# Patient Record
Sex: FEMALE | Race: WHITE | HISPANIC OR LATINO
[De-identification: names, ages, dates, MRNs, and addresses within clinical notes are randomized per-mention and may not be internally consistent; named-entity substitution may affect disease eponyms.]

---

## 2017-09-18 ENCOUNTER — APPOINTMENT (OUTPATIENT)
Dept: MAMMOGRAPHY | Facility: CLINIC | Age: 67
End: 2017-09-18
Payer: COMMERCIAL

## 2017-09-18 ENCOUNTER — OUTPATIENT (OUTPATIENT)
Dept: OUTPATIENT SERVICES | Facility: HOSPITAL | Age: 67
LOS: 1 days | End: 2017-09-18
Payer: COMMERCIAL

## 2017-09-18 DIAGNOSIS — Z00.8 ENCOUNTER FOR OTHER GENERAL EXAMINATION: ICD-10-CM

## 2017-09-18 PROCEDURE — 77063 BREAST TOMOSYNTHESIS BI: CPT

## 2017-09-18 PROCEDURE — 77067 SCR MAMMO BI INCL CAD: CPT

## 2017-09-18 PROCEDURE — G0202: CPT | Mod: 26

## 2017-09-18 PROCEDURE — 77063 BREAST TOMOSYNTHESIS BI: CPT | Mod: 26

## 2017-09-26 ENCOUNTER — OUTPATIENT (OUTPATIENT)
Dept: OUTPATIENT SERVICES | Facility: HOSPITAL | Age: 67
LOS: 1 days | End: 2017-09-26
Payer: COMMERCIAL

## 2017-09-26 ENCOUNTER — APPOINTMENT (OUTPATIENT)
Dept: MAMMOGRAPHY | Facility: CLINIC | Age: 67
End: 2017-09-26
Payer: COMMERCIAL

## 2017-09-26 DIAGNOSIS — Z00.8 ENCOUNTER FOR OTHER GENERAL EXAMINATION: ICD-10-CM

## 2017-09-26 PROCEDURE — G0206: CPT | Mod: 26,LT

## 2017-09-26 PROCEDURE — 77065 DX MAMMO INCL CAD UNI: CPT

## 2017-09-26 PROCEDURE — G0279: CPT

## 2017-09-26 PROCEDURE — G0279: CPT | Mod: 26

## 2018-04-03 ENCOUNTER — APPOINTMENT (OUTPATIENT)
Dept: MAMMOGRAPHY | Facility: CLINIC | Age: 68
End: 2018-04-03
Payer: COMMERCIAL

## 2018-04-03 ENCOUNTER — OUTPATIENT (OUTPATIENT)
Dept: OUTPATIENT SERVICES | Facility: HOSPITAL | Age: 68
LOS: 1 days | End: 2018-04-03
Payer: COMMERCIAL

## 2018-04-03 DIAGNOSIS — Z00.8 ENCOUNTER FOR OTHER GENERAL EXAMINATION: ICD-10-CM

## 2018-04-03 PROCEDURE — 77065 DX MAMMO INCL CAD UNI: CPT | Mod: 26,LT

## 2018-04-03 PROCEDURE — 77065 DX MAMMO INCL CAD UNI: CPT

## 2018-11-30 ENCOUNTER — APPOINTMENT (OUTPATIENT)
Dept: MAMMOGRAPHY | Facility: CLINIC | Age: 68
End: 2018-11-30
Payer: COMMERCIAL

## 2018-11-30 ENCOUNTER — OUTPATIENT (OUTPATIENT)
Dept: OUTPATIENT SERVICES | Facility: HOSPITAL | Age: 68
LOS: 1 days | End: 2018-11-30
Payer: COMMERCIAL

## 2018-11-30 DIAGNOSIS — Z00.8 ENCOUNTER FOR OTHER GENERAL EXAMINATION: ICD-10-CM

## 2018-11-30 PROCEDURE — 77066 DX MAMMO INCL CAD BI: CPT

## 2018-11-30 PROCEDURE — G0279: CPT | Mod: 26

## 2018-11-30 PROCEDURE — 77066 DX MAMMO INCL CAD BI: CPT | Mod: 26

## 2018-11-30 PROCEDURE — G0279: CPT

## 2018-12-03 ENCOUNTER — APPOINTMENT (OUTPATIENT)
Dept: OTOLARYNGOLOGY | Facility: CLINIC | Age: 68
End: 2018-12-03

## 2019-07-01 ENCOUNTER — RESULT REVIEW (OUTPATIENT)
Age: 69
End: 2019-07-01

## 2019-07-08 ENCOUNTER — APPOINTMENT (OUTPATIENT)
Dept: CT IMAGING | Facility: CLINIC | Age: 69
End: 2019-07-08
Payer: COMMERCIAL

## 2019-07-08 ENCOUNTER — OUTPATIENT (OUTPATIENT)
Dept: OUTPATIENT SERVICES | Facility: HOSPITAL | Age: 69
LOS: 1 days | End: 2019-07-08
Payer: COMMERCIAL

## 2019-07-08 DIAGNOSIS — Z00.8 ENCOUNTER FOR OTHER GENERAL EXAMINATION: ICD-10-CM

## 2019-07-08 PROCEDURE — 82565 ASSAY OF CREATININE: CPT

## 2019-07-08 PROCEDURE — 74177 CT ABD & PELVIS W/CONTRAST: CPT | Mod: 26

## 2019-07-08 PROCEDURE — 71260 CT THORAX DX C+: CPT | Mod: 26

## 2019-07-08 PROCEDURE — 71260 CT THORAX DX C+: CPT

## 2019-07-08 PROCEDURE — 74177 CT ABD & PELVIS W/CONTRAST: CPT

## 2019-07-22 ENCOUNTER — OUTPATIENT (OUTPATIENT)
Dept: OUTPATIENT SERVICES | Facility: HOSPITAL | Age: 69
LOS: 1 days | End: 2019-07-22
Payer: SELF-PAY

## 2019-07-22 ENCOUNTER — APPOINTMENT (OUTPATIENT)
Dept: SURGERY | Facility: HOSPITAL | Age: 69
End: 2019-07-22
Payer: SELF-PAY

## 2019-07-22 VITALS
SYSTOLIC BLOOD PRESSURE: 169 MMHG | BODY MASS INDEX: 22.47 KG/M2 | HEART RATE: 68 BPM | WEIGHT: 119 LBS | HEIGHT: 61 IN | DIASTOLIC BLOOD PRESSURE: 78 MMHG

## 2019-07-22 DIAGNOSIS — L72.3 SEBACEOUS CYST: ICD-10-CM

## 2019-07-22 DIAGNOSIS — Z86.79 PERSONAL HISTORY OF OTHER DISEASES OF THE CIRCULATORY SYSTEM: ICD-10-CM

## 2019-07-22 DIAGNOSIS — Z86.39 PERSONAL HISTORY OF OTHER ENDOCRINE, NUTRITIONAL AND METABOLIC DISEASE: ICD-10-CM

## 2019-07-22 DIAGNOSIS — Z85.42 PERSONAL HISTORY OF MALIGNANT NEOPLASM OF OTHER PARTS OF UTERUS: ICD-10-CM

## 2019-07-22 DIAGNOSIS — Z85.89 PERSONAL HISTORY OF MALIGNANT NEOPLASM OF OTHER ORGANS AND SYSTEMS: ICD-10-CM

## 2019-07-22 PROCEDURE — 99243 OFF/OP CNSLTJ NEW/EST LOW 30: CPT

## 2019-07-22 PROCEDURE — G0463: CPT

## 2019-07-22 NOTE — HISTORY OF PRESENT ILLNESS
[FreeTextEntry1] : Pt. is a 70 yo F w/ PMHx of HTN, Uterine CA s/p BRIANNA in 2009, who presents w/ a ventral hernia x 5 years.\par The mass was originally non-painful, but has be come increasingly painful in the last 2 years and has grown in size. She reports that the pain is constant and intense (7/10) and worsened when the bowel protrudes. The hernia has been reducible, but still uncomfortable. She also reports increasing frequency of diarrhea, now at least 1x/wk, and frequent bloating. She denies vomiting, nausea, fever.\par \par She was referred to the surgery clinic by her PMD (Dr. Edgar) for surgical consultation regarding potential hernia repair. She is amenable to surgical management.

## 2019-07-22 NOTE — ASSESSMENT
[FreeTextEntry1] : 68 yo F w/ PMHx of HTN, Uterine CA s/p BRIANNA in 2009, who presents w/ a reducible ventral hernia x 5 years. The hernia appears to be periumbilical on the right and has been increasing in size and pain over the past 2 years. This hernia is associated with abdominal bloating and recurrent diarrhea x 1/week.\par \par -Pt. agrees for elective surgical repair of hernia\par -Pt. will need medical clearance\par -F.u with pt. to schedule surgical date once clearance has been obtained

## 2019-07-22 NOTE — ADDENDUM
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\par 12/31/2007 @ [facility] - laryngoscopy, bilateral tonsillectomy, random biopsies -> all benign\par 8/5/2008 @ [facility] - level 2/3 right neck dissection -> 5 benign lymph nodes with treatment effect (had undergone chemo and XRT for cervical lymph node metastasis of unknown primary)\par 7/1/2014 @ [place] - EGD / colonoscopy (both relatively unremarkable)\par \par 7/8/2019 - CT chest / abd/pelvis w/ contrast - relatively unremarkable\par \par laparotomy scar extending up to the umbilicus\par 1 cm reducible incisional hernia at the umbilicus\par \par The risks (bleeding, infection, mesh infection, bowel injury), benefits and alternatives of incisional hernia repair with mesh were discussed with the patient in Chadian. She desires surgery and will be booked electively after obtaining medical clearance from her PMD @ [facility] ([place] [phone]).\par \[facility] Will coordinate booking at either [facility] or [place] with her daughter Bertha ([phone]).\par

## 2019-07-22 NOTE — PHYSICAL EXAM
[Normal Rate and Rhythm] : normal rate and rhythm [Abdomen Tenderness] : ~T ~M Abdominal tenderness [Abdominal Masses] : Abdominal mass present [No Rash or Lesion] : No rash or lesion [Calm] : calm [JVD] : no jugular venous distention  [Tender] : was nontender [Enlarged] : not enlarged [de-identified] : Appears well, looks her stated age of 69 [de-identified] : R-sided healed surgical scar [de-identified] : Pt. has a RLQ bulging mass measuring about 3cm x 5cm during cough or valsalva. Defect, when bowel is reduced, is appx. 1cm x 1cm in the R periumbilical region. Mass is tender to palpation. The rest of the abdominal exam is benign.

## 2019-07-22 NOTE — HISTORY OF PRESENT ILLNESS
[FreeTextEntry1] : Pt. is a 68 yo F w/ PMHx of HTN, Uterine CA s/p BRIANNA in 2009, who presents w/ a ventral hernia x 5 years.\par The mass was originally non-painful, but has be come increasingly painful in the last 2 years and has grown in size. She reports that the pain is constant and intense (7/10) and worsened when the bowel protrudes. The hernia has been reducible, but still uncomfortable. She also reports increasing frequency of diarrhea, now at least 1x/wk, and frequent bloating. She denies vomiting, nausea, fever.\par \par She was referred to the surgery clinic by her PMD (Dr. Edgar) for surgical consultation regarding potential hernia repair. She is amenable to surgical management.

## 2019-07-22 NOTE — REVIEW OF SYSTEMS
[Abdominal Pain] : abdominal pain [Diarrhea] : diarrhea [Negative] : Respiratory [Vomiting] : no vomiting [Constipation] : no constipation

## 2019-07-22 NOTE — CONSULT LETTER
[Dear  ___] : Dear  [unfilled], [Consult Letter:] : I had the pleasure of evaluating your patient, [unfilled]. [Please see my note below.] : Please see my note below. [FreeTextEntry1] : \par \par As you know, she has a small reducible incisional hernia near her umbilicus. I offered her incisional hernia repair with mesh and she desires surgery.\par Could you please provide her with medical clearance? Feel free to email it to me at [email or web address] or fax it to attention of my assistant Lucila Anderson at [phone].\par \par  [Consult Closing:] : Thank you very much for allowing me to participate in the care of this patient.  If you have any questions, please do not hesitate to contact me. [FreeTextEntry3] : \par Maxime Nash MD, FACS\par Division of Acute Care Surgery\par Health system\par [phone] (phone)\par [phone] (fax)\par

## 2019-07-22 NOTE — ASSESSMENT
[FreeTextEntry1] : 70 yo F w/ PMHx of HTN, Uterine CA s/p BRIANNA in 2009, who presents w/ a reducible ventral hernia x 5 years. The hernia appears to be periumbilical on the right and has been increasing in size and pain over the past 2 years. This hernia is associated with abdominal bloating and recurrent diarrhea x 1/week.\par \par -Pt. agrees for elective surgical repair of hernia\par -Pt. will need medical clearance\par -F.u with pt. to schedule surgical date once clearance has been obtained

## 2019-07-22 NOTE — PHYSICAL EXAM
[Normal Rate and Rhythm] : normal rate and rhythm [Abdomen Tenderness] : ~T ~M Abdominal tenderness [Abdominal Masses] : Abdominal mass present [No Rash or Lesion] : No rash or lesion [Calm] : calm [JVD] : no jugular venous distention  [Tender] : was nontender [Enlarged] : not enlarged [de-identified] : Appears well, looks her stated age of 69 [de-identified] : R-sided healed surgical scar [de-identified] : Pt. has a RLQ bulging mass measuring about 3cm x 5cm during cough or valsalva. Defect, when bowel is reduced, is appx. 1cm x 1cm in the R periumbilical region. Mass is tender to palpation. The rest of the abdominal exam is benign.

## 2019-07-22 NOTE — ADDENDUM
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\par 12/31/2007 @ [facility] - laryngoscopy, bilateral tonsillectomy, random biopsies -> all benign\par 8/5/2008 @ [facility] - level 2/3 right neck dissection -> 5 benign lymph nodes with treatment effect (had undergone chemo and XRT for cervical lymph node metastasis of unknown primary)\par 7/1/2014 @ [place] - EGD / colonoscopy (both relatively unremarkable)\par \par 7/8/2019 - CT chest / abd/pelvis w/ contrast - relatively unremarkable\par \par laparotomy scar extending up to the umbilicus\par 1 cm reducible incisional hernia at the umbilicus\par \par The risks (bleeding, infection, mesh infection, bowel injury), benefits and alternatives of incisional hernia repair with mesh were discussed with the patient in Wallisian. She desires surgery and will be booked electively after obtaining medical clearance from her PMD @ [facility] ([place] [phone]).\par \[facility] Will coordinate booking at either [facility] or [place] with her daughter Bertha ([phone]).\par

## 2019-07-25 DIAGNOSIS — K43.9 VENTRAL HERNIA WITHOUT OBSTRUCTION OR GANGRENE: ICD-10-CM

## 2019-07-29 ENCOUNTER — APPOINTMENT (OUTPATIENT)
Dept: UROLOGY | Facility: HOSPITAL | Age: 69
End: 2019-07-29

## 2019-07-29 ENCOUNTER — OUTPATIENT (OUTPATIENT)
Dept: OUTPATIENT SERVICES | Facility: HOSPITAL | Age: 69
LOS: 1 days | End: 2019-07-29
Payer: SELF-PAY

## 2019-07-29 VITALS
DIASTOLIC BLOOD PRESSURE: 67 MMHG | BODY MASS INDEX: 22.84 KG/M2 | HEIGHT: 61 IN | RESPIRATION RATE: 14 BRPM | SYSTOLIC BLOOD PRESSURE: 126 MMHG | HEART RATE: 64 BPM | WEIGHT: 121 LBS

## 2019-07-29 DIAGNOSIS — R30.0 DYSURIA: ICD-10-CM

## 2019-07-29 LAB
APPEARANCE UR: CLEAR — SIGNIFICANT CHANGE UP
BACTERIA # UR AUTO: NEGATIVE — SIGNIFICANT CHANGE UP
BILIRUB UR-MCNC: NEGATIVE — SIGNIFICANT CHANGE UP
COLOR SPEC: SIGNIFICANT CHANGE UP
DIFF PNL FLD: NEGATIVE — SIGNIFICANT CHANGE UP
EPI CELLS # UR: 0 /HPF — SIGNIFICANT CHANGE UP (ref 0–5)
GLUCOSE UR QL: NEGATIVE — SIGNIFICANT CHANGE UP
HYALINE CASTS # UR AUTO: 0 /LPF — SIGNIFICANT CHANGE UP (ref 0–7)
KETONES UR-MCNC: NEGATIVE — SIGNIFICANT CHANGE UP
LEUKOCYTE ESTERASE UR-ACNC: ABNORMAL
NITRITE UR-MCNC: NEGATIVE — SIGNIFICANT CHANGE UP
PH UR: 6.5 — SIGNIFICANT CHANGE UP (ref 5–8)
PROT UR-MCNC: NEGATIVE — SIGNIFICANT CHANGE UP
RBC CASTS # UR COMP ASSIST: 1 /HPF — SIGNIFICANT CHANGE UP (ref 0–4)
SP GR SPEC: 1.01 — LOW (ref 1.01–1.02)
UROBILINOGEN FLD QL: SIGNIFICANT CHANGE UP
WBC UR QL: 90 /HPF — HIGH (ref 0–5)

## 2019-07-29 PROCEDURE — 87086 URINE CULTURE/COLONY COUNT: CPT

## 2019-07-29 PROCEDURE — G0463: CPT

## 2019-07-29 PROCEDURE — 87186 SC STD MICRODIL/AGAR DIL: CPT

## 2019-07-29 PROCEDURE — 81001 URINALYSIS AUTO W/SCOPE: CPT

## 2019-07-29 RX ORDER — ERGOCALCIFEROL 1.25 MG/1
1.25 MG CAPSULE, LIQUID FILLED ORAL
Refills: 0 | Status: ACTIVE | COMMUNITY
Start: 2019-07-22

## 2019-07-29 NOTE — ASSESSMENT
[FreeTextEntry1] : 69F with his of intrarenal stone and ? hx of microheamturia presents with cc of dysuria likely 2/2 vulvovagianl atrophy\par \par - Decrease fluid intake slightly\par - UA & Ucx\par - Pyridium x2 days\par - Estrase cream \par - No planned intervention regarding stone as pt is asymptomatic\par - CT images reviewed, no indication for cysto at this time

## 2019-07-29 NOTE — HISTORY OF PRESENT ILLNESS
[Urinary Frequency] : urinary frequency [Nocturia] : nocturia [Hematuria - Microscopic] : microscopic hematuria [Dysuria] : dysuria [Urinary Incontinence] : no urinary incontinence [FreeTextEntry1] : 69F with PMHx of HTN and HLD presenting with c/o dysuria and hx of microhematuria.  Pt s/p CT w/ IV contrast that shows a 8mm L intrarenal stone but otherwise unremarkable.  Pt has urine cytology that is negative.  Pt denies gross hematuria, straining to void or feelings of incompletely emptying.  States she void 7x/day and has nocturia x3 but this is chronic.  no caffeine use anymore.  No spicy food.  Drink ~ 4 large bottles of water per day.  Very mild stress incontinence hx.  \par \par No smoking hx or exposure hazards\par No family hx of  related malignancies \par No personal or family hx of breast cancer\par Personal hx of uterine and ?head/neck cancer [Urinary Retention] : no urinary retention [Urinary Urgency] : no urinary urgency [Straining] : no straining [Abdominal Pain] : no abdominal pain [Hematuria - Gross] : no gross hematuria [Fever] : no fever [Flank Pain] : no flank pain [Nausea] : no nausea

## 2019-07-29 NOTE — PHYSICAL EXAM
[General Appearance - Well Developed] : well developed [General Appearance - Well Nourished] : well nourished [Normal Appearance] : normal appearance [Well Groomed] : well groomed [Abdomen Soft] : soft [Abdomen Tenderness] : non-tender [Urethral Meatus] : normal urethra [Urinary Bladder Findings] : the bladder was normal on palpation [Vulvar Atrophy] : vulvar atrophy [Atrophy] : atrophy [No Bleeding] : no active bleeding [Normal Station and Gait] : the gait and station were normal for the patient's age [Skin Color & Pigmentation] : normal skin color and pigmentation [] : no rash [Oriented To Time, Place, And Person] : oriented to person, place, and time [Affect] : the affect was normal [Mood] : the mood was normal [FreeTextEntry1] : No stress incontinence ntoed

## 2019-07-30 DIAGNOSIS — I10 ESSENTIAL (PRIMARY) HYPERTENSION: ICD-10-CM

## 2019-09-09 ENCOUNTER — APPOINTMENT (OUTPATIENT)
Dept: UROLOGY | Facility: HOSPITAL | Age: 69
End: 2019-09-09

## 2019-09-09 ENCOUNTER — OUTPATIENT (OUTPATIENT)
Dept: OUTPATIENT SERVICES | Facility: HOSPITAL | Age: 69
LOS: 1 days | End: 2019-09-09
Payer: SELF-PAY

## 2019-09-09 VITALS
HEIGHT: 61 IN | SYSTOLIC BLOOD PRESSURE: 151 MMHG | WEIGHT: 121 LBS | BODY MASS INDEX: 22.84 KG/M2 | HEART RATE: 69 BPM | DIASTOLIC BLOOD PRESSURE: 69 MMHG | RESPIRATION RATE: 14 BRPM

## 2019-09-09 DIAGNOSIS — N20.0 CALCULUS OF KIDNEY: ICD-10-CM

## 2019-09-09 DIAGNOSIS — R30.0 DYSURIA: ICD-10-CM

## 2019-09-09 DIAGNOSIS — Z87.440 PERSONAL HISTORY OF URINARY (TRACT) INFECTIONS: ICD-10-CM

## 2019-09-09 PROCEDURE — G0463: CPT

## 2019-09-09 PROCEDURE — 87186 SC STD MICRODIL/AGAR DIL: CPT

## 2019-09-09 PROCEDURE — 81001 URINALYSIS AUTO W/SCOPE: CPT

## 2019-09-09 PROCEDURE — 87086 URINE CULTURE/COLONY COUNT: CPT

## 2019-09-09 RX ORDER — CIPROFLOXACIN HYDROCHLORIDE 500 MG/1
500 TABLET, FILM COATED ORAL
Qty: 6 | Refills: 0 | Status: DISCONTINUED | COMMUNITY
Start: 2019-08-05 | End: 2019-09-09

## 2019-09-09 RX ORDER — PHENAZOPYRIDINE 100 MG/1
100 TABLET, FILM COATED ORAL 3 TIMES DAILY
Qty: 6 | Refills: 0 | Status: DISCONTINUED | COMMUNITY
Start: 2019-07-29 | End: 2019-09-09

## 2019-09-10 LAB
APPEARANCE UR: CLEAR — SIGNIFICANT CHANGE UP
BACTERIA # UR AUTO: ABNORMAL
BILIRUB UR-MCNC: NEGATIVE — SIGNIFICANT CHANGE UP
COLOR SPEC: SIGNIFICANT CHANGE UP
DIFF PNL FLD: NEGATIVE — SIGNIFICANT CHANGE UP
EPI CELLS # UR: 0 /HPF — SIGNIFICANT CHANGE UP (ref 0–5)
GLUCOSE UR QL: NEGATIVE — SIGNIFICANT CHANGE UP
HYALINE CASTS # UR AUTO: 0 /LPF — SIGNIFICANT CHANGE UP (ref 0–7)
KETONES UR-MCNC: NEGATIVE — SIGNIFICANT CHANGE UP
LEUKOCYTE ESTERASE UR-ACNC: ABNORMAL
NITRITE UR-MCNC: POSITIVE
PH UR: 6.5 — SIGNIFICANT CHANGE UP (ref 5–8)
PROT UR-MCNC: NEGATIVE — SIGNIFICANT CHANGE UP
RBC CASTS # UR COMP ASSIST: 0 /HPF — SIGNIFICANT CHANGE UP (ref 0–4)
SP GR SPEC: 1.01 — LOW (ref 1.01–1.02)
UROBILINOGEN FLD QL: SIGNIFICANT CHANGE UP
WBC UR QL: 86 /HPF — HIGH (ref 0–5)

## 2019-09-30 ENCOUNTER — OUTPATIENT (OUTPATIENT)
Dept: OUTPATIENT SERVICES | Facility: HOSPITAL | Age: 69
LOS: 1 days | End: 2019-09-30
Payer: SELF-PAY

## 2019-09-30 ENCOUNTER — APPOINTMENT (OUTPATIENT)
Dept: UROLOGY | Facility: HOSPITAL | Age: 69
End: 2019-09-30

## 2019-09-30 VITALS
WEIGHT: 122 LBS | DIASTOLIC BLOOD PRESSURE: 72 MMHG | TEMPERATURE: 36.5 F | HEIGHT: 60 IN | HEART RATE: 63 BPM | BODY MASS INDEX: 23.95 KG/M2 | SYSTOLIC BLOOD PRESSURE: 154 MMHG | RESPIRATION RATE: 16 BRPM

## 2019-09-30 DIAGNOSIS — R30.0 DYSURIA: ICD-10-CM

## 2019-09-30 DIAGNOSIS — R82.71 BACTERIURIA: ICD-10-CM

## 2019-09-30 PROBLEM — N20.0 NEPHROLITHIASIS: Status: ACTIVE | Noted: 2019-09-09

## 2019-09-30 LAB
APPEARANCE UR: ABNORMAL
BACTERIA # UR AUTO: NEGATIVE — SIGNIFICANT CHANGE UP
BILIRUB UR-MCNC: NEGATIVE — SIGNIFICANT CHANGE UP
COLOR SPEC: SIGNIFICANT CHANGE UP
DIFF PNL FLD: NEGATIVE — SIGNIFICANT CHANGE UP
EPI CELLS # UR: 1 /HPF — SIGNIFICANT CHANGE UP (ref 0–5)
GLUCOSE UR QL: NEGATIVE — SIGNIFICANT CHANGE UP
HYALINE CASTS # UR AUTO: 1 /LPF — SIGNIFICANT CHANGE UP (ref 0–7)
KETONES UR-MCNC: NEGATIVE — SIGNIFICANT CHANGE UP
LEUKOCYTE ESTERASE UR-ACNC: NEGATIVE — SIGNIFICANT CHANGE UP
NITRITE UR-MCNC: NEGATIVE — SIGNIFICANT CHANGE UP
PH UR: 7.5 — SIGNIFICANT CHANGE UP (ref 5–8)
PROT UR-MCNC: NEGATIVE — SIGNIFICANT CHANGE UP
RBC CASTS # UR COMP ASSIST: 2 /HPF — SIGNIFICANT CHANGE UP (ref 0–4)
SP GR SPEC: 1.01 — SIGNIFICANT CHANGE UP (ref 1.01–1.02)
UROBILINOGEN FLD QL: SIGNIFICANT CHANGE UP
WBC UR QL: 0 /HPF — SIGNIFICANT CHANGE UP (ref 0–5)

## 2019-09-30 PROCEDURE — 87086 URINE CULTURE/COLONY COUNT: CPT

## 2019-09-30 PROCEDURE — G0463: CPT

## 2019-09-30 PROCEDURE — 81001 URINALYSIS AUTO W/SCOPE: CPT

## 2019-09-30 RX ORDER — ESTRADIOL 0.1 MG/G
0.1 CREAM VAGINAL
Qty: 10 | Refills: 0 | Status: DISCONTINUED | COMMUNITY
Start: 2019-07-29 | End: 2019-09-30

## 2019-09-30 RX ORDER — NITROFURANTOIN (MONOHYDRATE/MACROCRYSTALS) 25; 75 MG/1; MG/1
100 CAPSULE ORAL
Qty: 28 | Refills: 0 | Status: DISCONTINUED | COMMUNITY
Start: 2019-09-09 | End: 2019-09-30

## 2019-09-30 NOTE — PHYSICAL EXAM
[General Appearance - Well Developed] : well developed [General Appearance - Well Nourished] : well nourished [Bowel Sounds] : normal bowel sounds [Normal Appearance] : normal appearance [Abdomen Tenderness] : non-tender [Abdomen Soft] : soft [Costovertebral Angle Tenderness] : no ~M costovertebral angle tenderness [Skin Color & Pigmentation] : normal skin color and pigmentation [Heart Rate And Rhythm] : Heart rate and rhythm were normal [] : no respiratory distress [Respiration, Rhythm And Depth] : normal respiratory rhythm and effort [Exaggerated Use Of Accessory Muscles For Inspiration] : no accessory muscle use [Oriented To Time, Place, And Person] : oriented to person, place, and time [Affect] : the affect was normal [Mood] : the mood was normal [Normal Station and Gait] : the gait and station were normal for the patient's age [FreeTextEntry1] : patient deferred

## 2019-09-30 NOTE — ASSESSMENT
[Urinary Tract Infection (599.0\N39.0)] : qualitative ~C was positive [FreeTextEntry1] : 69 yr F, hx of microhematuria and kidney stones, presenting for unresolved UTI. Due patient's continued dysuria and cloudy urine, as well as patient side effect of tendon pain, will change to macrobid, which is susceptible based on last culture. Will place on extended course of 14 days to ensure adequate coverage. With regards to water consumption, advised patient to continue adequate hydration as the benefits of preventing further UTI outweigh her very mild stress incontinence symptoms. \par -macrobid 100 mg q12 for 14 days\par -continue hydration\par -U/A and UCx today, will call if new growth is found\par -RTC 3 weeks\par \par Nephrolithiasis continues to be asymptomatic, denied flank or inguinal pain, hematuria, states pain is only during urination. 8 mm L intrarenal stone seen on previous CT\par -No interventions required at this time, will monitor for now\par -Discussed with patient if symptomatic for sharp  colicky, flank pain, will RTC \par \par

## 2019-09-30 NOTE — PHYSICAL EXAM
[General Appearance - Well Developed] : well developed [General Appearance - Well Nourished] : well nourished [Normal Appearance] : normal appearance [Bowel Sounds] : normal bowel sounds [Abdomen Tenderness] : non-tender [Abdomen Soft] : soft [Costovertebral Angle Tenderness] : no ~M costovertebral angle tenderness [Heart Rate And Rhythm] : Heart rate and rhythm were normal [Skin Color & Pigmentation] : normal skin color and pigmentation [Exaggerated Use Of Accessory Muscles For Inspiration] : no accessory muscle use [] : no respiratory distress [Respiration, Rhythm And Depth] : normal respiratory rhythm and effort [Oriented To Time, Place, And Person] : oriented to person, place, and time [Affect] : the affect was normal [Mood] : the mood was normal [Normal Station and Gait] : the gait and station were normal for the patient's age [FreeTextEntry1] : patient deferred

## 2019-09-30 NOTE — HISTORY OF PRESENT ILLNESS
[FreeTextEntry1] : 69 yr F, hx of microhematuria and kidney stones, presenting for f/u of UTI. Patient culture 3 weeks ago positive for E. Coli, given cipro 3 days and pyridamine 2 days, felt urinary symptoms improved slightly when on medication, but now endorsing dysuria and cloudy urine. Patient also reported calf tenderness while taking cipro. Denies fevers, chills, hematuria, urinary frequency, urinary urgency. On last visit, patient had reported mild stress incontinence and was asked to drink slightly less water (prev at 4 large bottles of water per day), continues to endorse mild incontinence only when coughing severely.\par \par Patient has 8 mm L intrarenal stone seen on CT that continues to be asymptomatic, denies flank pain or gross hematuria. Negative cytology\par \par In regards to vaginal atrophy seen on last exam, patient was not able to obtain estrogen cream because it was too expensive. Does not have complaints at this time.\par \par Patient is Gabonese speaking with limited english, hx obtained from patient and daughter in room

## 2019-10-01 LAB
CULTURE RESULTS: SIGNIFICANT CHANGE UP
SPECIMEN SOURCE: SIGNIFICANT CHANGE UP

## 2019-10-04 ENCOUNTER — OUTPATIENT (OUTPATIENT)
Dept: OUTPATIENT SERVICES | Facility: HOSPITAL | Age: 69
LOS: 1 days | End: 2019-10-04
Payer: COMMERCIAL

## 2019-10-04 VITALS
HEART RATE: 69 BPM | HEIGHT: 60 IN | RESPIRATION RATE: 16 BRPM | TEMPERATURE: 98 F | DIASTOLIC BLOOD PRESSURE: 58 MMHG | OXYGEN SATURATION: 100 % | SYSTOLIC BLOOD PRESSURE: 142 MMHG | WEIGHT: 119.05 LBS

## 2019-10-04 DIAGNOSIS — Z98.890 OTHER SPECIFIED POSTPROCEDURAL STATES: Chronic | ICD-10-CM

## 2019-10-04 DIAGNOSIS — K43.9 VENTRAL HERNIA WITHOUT OBSTRUCTION OR GANGRENE: ICD-10-CM

## 2019-10-04 DIAGNOSIS — Z01.818 ENCOUNTER FOR OTHER PREPROCEDURAL EXAMINATION: ICD-10-CM

## 2019-10-04 LAB
ANION GAP SERPL CALC-SCNC: 5 MMOL/L — SIGNIFICANT CHANGE UP (ref 5–17)
APTT BLD: 33.1 SEC — SIGNIFICANT CHANGE UP (ref 27.5–36.3)
BASOPHILS # BLD AUTO: 0.06 K/UL — SIGNIFICANT CHANGE UP (ref 0–0.2)
BASOPHILS NFR BLD AUTO: 1 % — SIGNIFICANT CHANGE UP (ref 0–2)
BUN SERPL-MCNC: 19 MG/DL — SIGNIFICANT CHANGE UP (ref 7–23)
CALCIUM SERPL-MCNC: 9.6 MG/DL — SIGNIFICANT CHANGE UP (ref 8.5–10.1)
CHLORIDE SERPL-SCNC: 106 MMOL/L — SIGNIFICANT CHANGE UP (ref 96–108)
CO2 SERPL-SCNC: 27 MMOL/L — SIGNIFICANT CHANGE UP (ref 22–31)
CREAT SERPL-MCNC: 0.9 MG/DL — SIGNIFICANT CHANGE UP (ref 0.5–1.3)
EOSINOPHIL # BLD AUTO: 0.11 K/UL — SIGNIFICANT CHANGE UP (ref 0–0.5)
EOSINOPHIL NFR BLD AUTO: 1.9 % — SIGNIFICANT CHANGE UP (ref 0–6)
GLUCOSE SERPL-MCNC: 111 MG/DL — HIGH (ref 70–99)
HCT VFR BLD CALC: 36.7 % — SIGNIFICANT CHANGE UP (ref 34.5–45)
HGB BLD-MCNC: 11.5 G/DL — SIGNIFICANT CHANGE UP (ref 11.5–15.5)
IMM GRANULOCYTES NFR BLD AUTO: 0.2 % — SIGNIFICANT CHANGE UP (ref 0–1.5)
INR BLD: 1.06 RATIO — SIGNIFICANT CHANGE UP (ref 0.88–1.16)
LYMPHOCYTES # BLD AUTO: 1.83 K/UL — SIGNIFICANT CHANGE UP (ref 1–3.3)
LYMPHOCYTES # BLD AUTO: 32 % — SIGNIFICANT CHANGE UP (ref 13–44)
MCHC RBC-ENTMCNC: 26.4 PG — LOW (ref 27–34)
MCHC RBC-ENTMCNC: 31.3 GM/DL — LOW (ref 32–36)
MCV RBC AUTO: 84.2 FL — SIGNIFICANT CHANGE UP (ref 80–100)
MONOCYTES # BLD AUTO: 0.51 K/UL — SIGNIFICANT CHANGE UP (ref 0–0.9)
MONOCYTES NFR BLD AUTO: 8.9 % — SIGNIFICANT CHANGE UP (ref 2–14)
NEUTROPHILS # BLD AUTO: 3.2 K/UL — SIGNIFICANT CHANGE UP (ref 1.8–7.4)
NEUTROPHILS NFR BLD AUTO: 56 % — SIGNIFICANT CHANGE UP (ref 43–77)
PLATELET # BLD AUTO: 246 K/UL — SIGNIFICANT CHANGE UP (ref 150–400)
POTASSIUM SERPL-MCNC: 3.8 MMOL/L — SIGNIFICANT CHANGE UP (ref 3.5–5.3)
POTASSIUM SERPL-SCNC: 3.8 MMOL/L — SIGNIFICANT CHANGE UP (ref 3.5–5.3)
PROTHROM AB SERPL-ACNC: 11.8 SEC — SIGNIFICANT CHANGE UP (ref 10–12.9)
RBC # BLD: 4.36 M/UL — SIGNIFICANT CHANGE UP (ref 3.8–5.2)
RBC # FLD: 15.7 % — HIGH (ref 10.3–14.5)
SODIUM SERPL-SCNC: 138 MMOL/L — SIGNIFICANT CHANGE UP (ref 135–145)
WBC # BLD: 5.72 K/UL — SIGNIFICANT CHANGE UP (ref 3.8–10.5)
WBC # FLD AUTO: 5.72 K/UL — SIGNIFICANT CHANGE UP (ref 3.8–10.5)

## 2019-10-04 PROCEDURE — 93005 ELECTROCARDIOGRAM TRACING: CPT

## 2019-10-04 PROCEDURE — 87640 STAPH A DNA AMP PROBE: CPT

## 2019-10-04 PROCEDURE — 85025 COMPLETE CBC W/AUTO DIFF WBC: CPT

## 2019-10-04 PROCEDURE — 86901 BLOOD TYPING SEROLOGIC RH(D): CPT

## 2019-10-04 PROCEDURE — G0463: CPT | Mod: 25

## 2019-10-04 PROCEDURE — 71046 X-RAY EXAM CHEST 2 VIEWS: CPT | Mod: 26

## 2019-10-04 PROCEDURE — 86900 BLOOD TYPING SEROLOGIC ABO: CPT

## 2019-10-04 PROCEDURE — 85610 PROTHROMBIN TIME: CPT

## 2019-10-04 PROCEDURE — 87641 MR-STAPH DNA AMP PROBE: CPT

## 2019-10-04 PROCEDURE — 80048 BASIC METABOLIC PNL TOTAL CA: CPT

## 2019-10-04 PROCEDURE — 86850 RBC ANTIBODY SCREEN: CPT

## 2019-10-04 PROCEDURE — 71046 X-RAY EXAM CHEST 2 VIEWS: CPT

## 2019-10-04 PROCEDURE — 93010 ELECTROCARDIOGRAM REPORT: CPT

## 2019-10-04 PROCEDURE — 85730 THROMBOPLASTIN TIME PARTIAL: CPT

## 2019-10-04 PROCEDURE — 36415 COLL VENOUS BLD VENIPUNCTURE: CPT

## 2019-10-04 NOTE — H&P PST ADULT - HISTORY OF PRESENT ILLNESS
69 year old female with incisional hernia she presents to PST for planned incisional hernia repair 69 year old female with incisional hernia c/o abdominal pain especially when walking as well as tenderness and bulging hernia;   she presents to PST for planned incisional hernia repair 69 year old female with incisional hernia c/o abdominal pain especially when walking as well as tenderness and bulging hernia;   she presents to PST for planned incisional hernia repair   Pt had radiation of neck due to SCC with lymph node removal ; Pt said she can eat and swallow pills without difficulty however she has difficulty eating dry foods like cheese crackers ;

## 2019-10-04 NOTE — H&P PST ADULT - NSICDXPASTMEDICALHX_GEN_ALL_CORE_FT
PAST MEDICAL HISTORY:  CAD (coronary artery disease)     Chest Pain (ICD9 786.50)     Chronic Gastritis (ICD9 535.10)     Dyslipidemia (ICD9 272.4)     H/O: Osteoarthritis (ICD9 V13.4)     History of PSVT (paroxysmal supraventricular tachycardia)     HTN - Hypertension     SCC (Squamous Cell Carcinoma) (ICD9 173.9) Head and neck 2009 treated with Chem/Radiation/ LN removal    Uterine Cancer (ICD9 179) PAST MEDICAL HISTORY:  CAD (coronary artery disease)     Chest Pain (ICD9 786.50)     Chronic Gastritis (ICD9 535.10)     Dyslipidemia (ICD9 272.4)     H/O: Osteoarthritis (ICD9 V13.4)     History of PSVT (paroxysmal supraventricular tachycardia)     HTN - Hypertension     Incisional hernia     Kidney stone     SCC (Squamous Cell Carcinoma) (ICD9 173.9) Head and neck 2009 treated with Chem/Radiation/ LN removal    Uterine Cancer (ICD9 179) no chemo no radiation

## 2019-10-04 NOTE — H&P PST ADULT - NSICDXPASTSURGICALHX_GEN_ALL_CORE_FT
PAST SURGICAL HISTORY:  H/O: Hysterectomy (ICD9 V88.01)     History of Cholecystectomy (ICD9 V45.79)     History of Tonsillectomy (ICD9 V45.89)     Lymph Node Cancer (ICD9 196.9) removal of cervical LN due to head & neck squamous cell carcinoma PAST SURGICAL HISTORY:  H/O: Hysterectomy (ICD9 V88.01) at age 32    History of Cholecystectomy (ICD9 V45.79) 2007    History of Tonsillectomy (ICD9 V45.89)     Lymph Node Cancer (ICD9 196.9) removal of cervical LN due to head & neck squamous cell carcinoma ; 2009    S/P cardiac cath 2009

## 2019-10-04 NOTE — H&P PST ADULT - ATTENDING COMMENTS
Cleared by her PMD at [facility] last week.  She describes tongue swelling and syncope after PCN in the past.  reducible incisional hernia palpated just to the right inferior aspect of her umbilicus.    The risks (bleeding, infection, bowel injury, mesh infection requiring surgical removal), benefits and alternatives of incisional hernia repair with mesh were discussed with the patient in Bulgarian. Written informed consent was obtained for surgery.

## 2019-10-04 NOTE — H&P PST ADULT - ASSESSMENT
69 year old female  presents to PST for planned incisional hernia repair     Plan:  1. PST instructions given ; NPO post midnight   2. Pt instructed to take following meds with sip of water : metoprolol lisinopril amlodipine   3. EZ wash instructions given & mupirocin instructions given  4. Medical Optimization  with Dr David Edgar   5. Stop NSAIDS ( Aspirin Alev Motrin Mobic Diclofenac), herbal supplements , MVI , Vitamin fish oil 7 days prior to surgery  unless directed by surgeon or cardiologist;   6. Labs EKG CXR as per surgeon request 69 year old female  presents to PST for planned incisional hernia repair     Plan:  1. PST instructions given ; NPO post midnight   2. Pt instructed to take following meds with sip of water : metoprolol lisinopril amlodipine   3. EZ wash instructions given & mupirocin instructions given  4. Medical Optimization  with Dr David Edgar   5. Stop NSAIDS ( Aspirin Alev Motrin Mobic Diclofenac), herbal supplements , MVI , Vitamin fish oil 7 days prior to surgery  unless directed by surgeon or cardiologist;   6. Labs EKG CXR as per surgeon request   7. Patient with history of radiation of throat left message anesthesia office 69 year old female  presents to PST for planned incisional hernia repair     Plan:  1. PST instructions given ; NPO post midnight   2. Pt instructed to take following meds with sip of water : metoprolol lisinopril amlodipine   3. EZ wash instructions given & mupirocin instructions given  4. Medical Optimization  with Dr David Edgar   5. Stop NSAIDS ( Aspirin Alev Motrin Mobic Diclofenac), herbal supplements , MVI , Vitamin fish oil 7 days prior to surgery  unless directed by surgeon or cardiologist;   6. Labs EKG CXR as per surgeon request   7. Patient with history of radiation of throat left message anesthesia office ; 69 year old female  presents to PST for planned incisional hernia repair     Plan:  1. PST instructions given ; NPO post midnight   2. Pt instructed to take following meds with sip of water : metoprolol lisinopril amlodipine   3. EZ wash instructions given & mupirocin instructions given  4. Medical Optimization  with Dr David Edgar   5. Stop NSAIDS ( Aspirin Alev Motrin Mobic Diclofenac), herbal supplements , MVI , Vitamin fish oil 7 days prior to surgery  unless directed by surgeon or cardiologist;   6. Labs EKG CXR as per surgeon request   7. Patient with history of radiation of throat ; discussed with Dr Garcia pt throat not severely deformed pt able to breath without difficulty

## 2019-10-05 DIAGNOSIS — Z01.818 ENCOUNTER FOR OTHER PREPROCEDURAL EXAMINATION: ICD-10-CM

## 2019-10-05 DIAGNOSIS — K43.9 VENTRAL HERNIA WITHOUT OBSTRUCTION OR GANGRENE: ICD-10-CM

## 2019-10-05 LAB
MRSA PCR RESULT.: SIGNIFICANT CHANGE UP
S AUREUS DNA NOSE QL NAA+PROBE: SIGNIFICANT CHANGE UP

## 2019-10-10 ENCOUNTER — OUTPATIENT (OUTPATIENT)
Dept: INPATIENT UNIT | Facility: HOSPITAL | Age: 69
LOS: 1 days | Discharge: HOME | End: 2019-10-10
Payer: COMMERCIAL

## 2019-10-10 ENCOUNTER — APPOINTMENT (OUTPATIENT)
Age: 69
End: 2019-10-10
Payer: SUBSIDIZED

## 2019-10-10 VITALS
OXYGEN SATURATION: 99 % | DIASTOLIC BLOOD PRESSURE: 50 MMHG | TEMPERATURE: 98 F | SYSTOLIC BLOOD PRESSURE: 135 MMHG | RESPIRATION RATE: 14 BRPM | HEART RATE: 67 BPM

## 2019-10-10 VITALS
HEIGHT: 60 IN | TEMPERATURE: 98 F | DIASTOLIC BLOOD PRESSURE: 79 MMHG | OXYGEN SATURATION: 100 % | WEIGHT: 119.05 LBS | SYSTOLIC BLOOD PRESSURE: 154 MMHG | RESPIRATION RATE: 16 BRPM | HEART RATE: 79 BPM

## 2019-10-10 DIAGNOSIS — K43.9 VENTRAL HERNIA WITHOUT OBSTRUCTION OR GANGRENE: ICD-10-CM

## 2019-10-10 DIAGNOSIS — Z98.890 OTHER SPECIFIED POSTPROCEDURAL STATES: Chronic | ICD-10-CM

## 2019-10-10 PROCEDURE — 49561: CPT | Mod: GC

## 2019-10-10 PROCEDURE — 49568: CPT | Mod: GC

## 2019-10-10 PROCEDURE — C1781: CPT

## 2019-10-10 RX ORDER — OXYCODONE HYDROCHLORIDE 5 MG/1
1 TABLET ORAL
Qty: 0 | Refills: 0 | DISCHARGE
Start: 2019-10-10

## 2019-10-10 RX ORDER — MORPHINE SULFATE 50 MG/1
2 CAPSULE, EXTENDED RELEASE ORAL EVERY 6 HOURS
Refills: 0 | Status: DISCONTINUED | OUTPATIENT
Start: 2019-10-10 | End: 2019-10-10

## 2019-10-10 RX ORDER — OXYCODONE HYDROCHLORIDE 5 MG/1
5 TABLET ORAL ONCE
Refills: 0 | Status: DISCONTINUED | OUTPATIENT
Start: 2019-10-10 | End: 2019-10-10

## 2019-10-10 RX ORDER — SODIUM CHLORIDE 9 MG/ML
1000 INJECTION, SOLUTION INTRAVENOUS
Refills: 0 | Status: DISCONTINUED | OUTPATIENT
Start: 2019-10-10 | End: 2019-10-10

## 2019-10-10 RX ORDER — ONDANSETRON 8 MG/1
4 TABLET, FILM COATED ORAL ONCE
Refills: 0 | Status: COMPLETED | OUTPATIENT
Start: 2019-10-10 | End: 2019-10-10

## 2019-10-10 RX ORDER — OXYCODONE HYDROCHLORIDE 5 MG/1
10 TABLET ORAL ONCE
Refills: 0 | Status: DISCONTINUED | OUTPATIENT
Start: 2019-10-10 | End: 2019-10-10

## 2019-10-10 RX ORDER — IBUPROFEN 200 MG
600 TABLET ORAL EVERY 4 HOURS
Refills: 0 | Status: DISCONTINUED | OUTPATIENT
Start: 2019-10-10 | End: 2019-10-10

## 2019-10-10 RX ORDER — FENTANYL CITRATE 50 UG/ML
50 INJECTION INTRAVENOUS
Refills: 0 | Status: DISCONTINUED | OUTPATIENT
Start: 2019-10-10 | End: 2019-10-10

## 2019-10-10 RX ADMIN — FENTANYL CITRATE 50 MICROGRAM(S): 50 INJECTION INTRAVENOUS at 18:00

## 2019-10-10 RX ADMIN — ONDANSETRON 4 MILLIGRAM(S): 8 TABLET, FILM COATED ORAL at 19:46

## 2019-10-10 RX ADMIN — FENTANYL CITRATE 50 MICROGRAM(S): 50 INJECTION INTRAVENOUS at 17:46

## 2019-10-10 RX ADMIN — FENTANYL CITRATE 50 MICROGRAM(S): 50 INJECTION INTRAVENOUS at 18:35

## 2019-10-10 RX ADMIN — OXYCODONE HYDROCHLORIDE 10 MILLIGRAM(S): 5 TABLET ORAL at 17:46

## 2019-10-10 RX ADMIN — OXYCODONE HYDROCHLORIDE 10 MILLIGRAM(S): 5 TABLET ORAL at 18:25

## 2019-10-10 NOTE — BRIEF OPERATIVE NOTE - NSICDXBRIEFPROCEDURE_GEN_ALL_CORE_FT
PROCEDURES:  Open repair of incisional hernia using synthetic mesh 10-Oct-2019 17:39:17  Calixto Romo

## 2019-10-10 NOTE — BRIEF OPERATIVE NOTE - OPERATION/FINDINGS
4cm fascial defect encountered  Fat containing incisional hernia reduced  8cm ventralex mesh used for repair

## 2019-10-10 NOTE — ASU DISCHARGE PLAN (ADULT/PEDIATRIC) - CARE PROVIDER_API CALL
Maxime Nash (MD)  Surgery; Surgical Critical Care  [address], [address], [place] [id number]  Phone: [phone]  Fax: [phone]  Follow Up Time:

## 2019-10-10 NOTE — ASU PATIENT PROFILE, ADULT - PSH
H/O: Hysterectomy (ICD9 V88.01)  at age 32  History of Cholecystectomy (ICD9 V45.79)  2007  History of Tonsillectomy (ICD9 V45.89)    Lymph Node Cancer (ICD9 196.9)  removal of cervical LN due to head & neck squamous cell carcinoma ; 2009  S/P cardiac cath  2009

## 2019-10-11 RX ORDER — OXYCODONE HYDROCHLORIDE 5 MG/1
1 TABLET ORAL
Qty: 20 | Refills: 0
Start: 2019-10-11 | End: 2019-10-17

## 2019-10-14 NOTE — PHYSICAL EXAM
[General Appearance - Well Nourished] : well nourished [General Appearance - Well Developed] : well developed [Normal Appearance] : normal appearance [Well Groomed] : well groomed [General Appearance - In No Acute Distress] : no acute distress [Abdomen Tenderness] : non-tender [Abdomen Soft] : soft [Abdomen Mass (___ Cm)] : no abdominal mass palpated [Skin Color & Pigmentation] : normal skin color and pigmentation [] : no respiratory distress [Oriented To Time, Place, And Person] : oriented to person, place, and time [Not Anxious] : not anxious [Affect] : the affect was normal [Mood] : the mood was normal [Normal Station and Gait] : the gait and station were normal for the patient's age [No Focal Deficits] : no focal deficits [FreeTextEntry1] : No stress incontinence when asked ot void, +cystocele and + anterior movement of urethra upon cough

## 2019-10-14 NOTE — REVIEW OF SYSTEMS
[Dysuria] : dysuria [Negative] : Psychiatric [Fever] : no fever [Chills] : no chills [Shortness Of Breath] : no shortness of breath [Vomiting] : no vomiting [Incontinence] : no incontinence [Abdominal Pain] : no abdominal pain

## 2019-10-14 NOTE — HISTORY OF PRESENT ILLNESS
[Urinary Urgency] : urinary urgency [Urinary Frequency] : urinary frequency [Dysuria] : dysuria [FreeTextEntry1] : ( [id number]) This is a 69 year old female hx of microhematuria, kidney stones here for follow up for recurrent UTIs.  She was switched from cipro to macrobid for 14 days due to continued symptoms and left calf pain.  Most recent urine culture 7/3/19 shows >100k ecoli.  Since she was switched over to macrobid, she has completed her course and states that her dysuria improved but are not completely gone.  Also endorses suprapubic pain that occurs 2x a week.  Denies fevers, chills, N/V, flank/back pain, hematuria. [Urinary Incontinence] : no urinary incontinence [Straining] : no straining [Nocturia] : no nocturia [Urinary Retention] : no urinary retention [Weak Stream] : no weak stream [Hematuria - Gross] : no gross hematuria [Abdominal Pain] : no abdominal pain [Flank Pain] : no flank pain

## 2019-10-14 NOTE — ASSESSMENT
[FreeTextEntry1] : 69 year old female with recurrent UTIs, nephrolithiasis\par \par -no intervention needed currently for the kidney stones, given patient is asymptomatic\par -obtain repeat urine culture and urinalysis to assess resolution of UTI\par -antibiotics not prescribed on this visit\par -will follow results

## 2019-10-16 DIAGNOSIS — I25.10 ATHEROSCLEROTIC HEART DISEASE OF NATIVE CORONARY ARTERY WITHOUT ANGINA PECTORIS: ICD-10-CM

## 2019-10-16 DIAGNOSIS — K21.9 GASTRO-ESOPHAGEAL REFLUX DISEASE WITHOUT ESOPHAGITIS: ICD-10-CM

## 2019-10-16 DIAGNOSIS — K43.0 INCISIONAL HERNIA WITH OBSTRUCTION, WITHOUT GANGRENE: ICD-10-CM

## 2019-10-16 DIAGNOSIS — Z79.82 LONG TERM (CURRENT) USE OF ASPIRIN: ICD-10-CM

## 2019-10-16 DIAGNOSIS — Z85.828 PERSONAL HISTORY OF OTHER MALIGNANT NEOPLASM OF SKIN: ICD-10-CM

## 2019-10-16 DIAGNOSIS — Z85.858 PERSONAL HISTORY OF MALIGNANT NEOPLASM OF OTHER ENDOCRINE GLANDS: ICD-10-CM

## 2019-10-16 DIAGNOSIS — E78.5 HYPERLIPIDEMIA, UNSPECIFIED: ICD-10-CM

## 2019-10-16 DIAGNOSIS — Z90.710 ACQUIRED ABSENCE OF BOTH CERVIX AND UTERUS: ICD-10-CM

## 2019-10-16 DIAGNOSIS — Z85.42 PERSONAL HISTORY OF MALIGNANT NEOPLASM OF OTHER PARTS OF UTERUS: ICD-10-CM

## 2019-10-16 DIAGNOSIS — M19.90 UNSPECIFIED OSTEOARTHRITIS, UNSPECIFIED SITE: ICD-10-CM

## 2019-10-16 DIAGNOSIS — Z90.49 ACQUIRED ABSENCE OF OTHER SPECIFIED PARTS OF DIGESTIVE TRACT: ICD-10-CM

## 2019-10-16 DIAGNOSIS — Z88.8 ALLERGY STATUS TO OTHER DRUGS, MEDICAMENTS AND BIOLOGICAL SUBSTANCES STATUS: ICD-10-CM

## 2019-10-16 DIAGNOSIS — I10 ESSENTIAL (PRIMARY) HYPERTENSION: ICD-10-CM

## 2019-10-16 DIAGNOSIS — I47.1 SUPRAVENTRICULAR TACHYCARDIA: ICD-10-CM

## 2019-10-16 DIAGNOSIS — Z88.0 ALLERGY STATUS TO PENICILLIN: ICD-10-CM

## 2019-10-21 PROBLEM — N20.0 CALCULUS OF KIDNEY: Chronic | Status: ACTIVE | Noted: 2019-10-04

## 2019-10-21 PROBLEM — Z86.79 PERSONAL HISTORY OF OTHER DISEASES OF THE CIRCULATORY SYSTEM: Chronic | Status: ACTIVE | Noted: 2019-10-04

## 2019-10-21 PROBLEM — K43.2 INCISIONAL HERNIA WITHOUT OBSTRUCTION OR GANGRENE: Chronic | Status: ACTIVE | Noted: 2019-10-04

## 2019-10-21 PROBLEM — I25.10 ATHEROSCLEROTIC HEART DISEASE OF NATIVE CORONARY ARTERY WITHOUT ANGINA PECTORIS: Chronic | Status: ACTIVE | Noted: 2019-10-04

## 2019-10-23 ENCOUNTER — APPOINTMENT (OUTPATIENT)
Dept: SURGERY | Facility: CLINIC | Age: 69
End: 2019-10-23
Payer: SELF-PAY

## 2019-10-23 VITALS
DIASTOLIC BLOOD PRESSURE: 69 MMHG | HEART RATE: 71 BPM | WEIGHT: 122 LBS | HEIGHT: 60 IN | OXYGEN SATURATION: 97 % | BODY MASS INDEX: 23.95 KG/M2 | SYSTOLIC BLOOD PRESSURE: 131 MMHG

## 2019-10-23 DIAGNOSIS — K43.9 VENTRAL HERNIA W/OUT OBSTRUCTION OR GANGRENE: ICD-10-CM

## 2019-10-23 PROCEDURE — 99024 POSTOP FOLLOW-UP VISIT: CPT

## 2019-10-23 NOTE — HISTORY OF PRESENT ILLNESS
[de-identified] : no pathology [de-identified] : tolerating regular diet without nausea, vomiting or abdominal pain. initially constipated after surgery, but now having normal BM.\par no fevers or chills.\par c/o pain several centimeters to the right of her incision since the day of surgery. pain is worse with movement and laying down at night.

## 2019-10-23 NOTE — PHYSICAL EXAM
[de-identified] : appears well [de-identified] : soft / NT / ND [de-identified] : midline periumbilical incision healed without infection.

## 2019-10-23 NOTE — PLAN
[FreeTextEntry1] : I removed the staples.\par I explained that the pain is likely some pulling on her right external oblique muscles after surgery. She should return to the office in 2 weeks if the pain is not improving.

## 2019-10-28 ENCOUNTER — APPOINTMENT (OUTPATIENT)
Dept: UROLOGY | Facility: HOSPITAL | Age: 69
End: 2019-10-28

## 2020-01-12 ENCOUNTER — EMERGENCY (EMERGENCY)
Facility: HOSPITAL | Age: 70
LOS: 0 days | Discharge: HOME | End: 2020-01-12
Attending: STUDENT IN AN ORGANIZED HEALTH CARE EDUCATION/TRAINING PROGRAM
Payer: SELF-PAY

## 2020-01-12 VITALS
HEIGHT: 60 IN | TEMPERATURE: 98 F | WEIGHT: 104.94 LBS | SYSTOLIC BLOOD PRESSURE: 143 MMHG | DIASTOLIC BLOOD PRESSURE: 56 MMHG | OXYGEN SATURATION: 98 % | HEART RATE: 72 BPM | RESPIRATION RATE: 16 BRPM

## 2020-01-12 VITALS
DIASTOLIC BLOOD PRESSURE: 54 MMHG | SYSTOLIC BLOOD PRESSURE: 143 MMHG | RESPIRATION RATE: 18 BRPM | TEMPERATURE: 97 F | OXYGEN SATURATION: 98 % | HEART RATE: 79 BPM

## 2020-01-12 DIAGNOSIS — Z79.82 LONG TERM (CURRENT) USE OF ASPIRIN: ICD-10-CM

## 2020-01-12 DIAGNOSIS — I10 ESSENTIAL (PRIMARY) HYPERTENSION: ICD-10-CM

## 2020-01-12 DIAGNOSIS — Z98.890 OTHER SPECIFIED POSTPROCEDURAL STATES: Chronic | ICD-10-CM

## 2020-01-12 DIAGNOSIS — R42 DIZZINESS AND GIDDINESS: ICD-10-CM

## 2020-01-12 DIAGNOSIS — Z85.42 PERSONAL HISTORY OF MALIGNANT NEOPLASM OF OTHER PARTS OF UTERUS: ICD-10-CM

## 2020-01-12 DIAGNOSIS — I25.10 ATHEROSCLEROTIC HEART DISEASE OF NATIVE CORONARY ARTERY WITHOUT ANGINA PECTORIS: ICD-10-CM

## 2020-01-12 DIAGNOSIS — Z88.0 ALLERGY STATUS TO PENICILLIN: ICD-10-CM

## 2020-01-12 DIAGNOSIS — Z87.442 PERSONAL HISTORY OF URINARY CALCULI: ICD-10-CM

## 2020-01-12 DIAGNOSIS — Z85.828 PERSONAL HISTORY OF OTHER MALIGNANT NEOPLASM OF SKIN: ICD-10-CM

## 2020-01-12 DIAGNOSIS — R55 SYNCOPE AND COLLAPSE: ICD-10-CM

## 2020-01-12 LAB
ALBUMIN SERPL ELPH-MCNC: 4.1 G/DL — SIGNIFICANT CHANGE UP (ref 3.3–5)
ALP SERPL-CCNC: 116 U/L — SIGNIFICANT CHANGE UP (ref 40–120)
ALT FLD-CCNC: 19 U/L — SIGNIFICANT CHANGE UP (ref 12–78)
ANION GAP SERPL CALC-SCNC: 5 MMOL/L — SIGNIFICANT CHANGE UP (ref 5–17)
APTT BLD: 31 SEC — SIGNIFICANT CHANGE UP (ref 27.5–36.3)
AST SERPL-CCNC: 19 U/L — SIGNIFICANT CHANGE UP (ref 15–37)
BASOPHILS # BLD AUTO: 0.03 K/UL — SIGNIFICANT CHANGE UP (ref 0–0.2)
BASOPHILS NFR BLD AUTO: 0.2 % — SIGNIFICANT CHANGE UP (ref 0–2)
BILIRUB SERPL-MCNC: 0.4 MG/DL — SIGNIFICANT CHANGE UP (ref 0.2–1.2)
BUN SERPL-MCNC: 15 MG/DL — SIGNIFICANT CHANGE UP (ref 7–23)
CALCIUM SERPL-MCNC: 9.4 MG/DL — SIGNIFICANT CHANGE UP (ref 8.5–10.1)
CHLORIDE SERPL-SCNC: 104 MMOL/L — SIGNIFICANT CHANGE UP (ref 96–108)
CO2 SERPL-SCNC: 26 MMOL/L — SIGNIFICANT CHANGE UP (ref 22–31)
CREAT SERPL-MCNC: 0.91 MG/DL — SIGNIFICANT CHANGE UP (ref 0.5–1.3)
EOSINOPHIL # BLD AUTO: 0.15 K/UL — SIGNIFICANT CHANGE UP (ref 0–0.5)
EOSINOPHIL NFR BLD AUTO: 1 % — SIGNIFICANT CHANGE UP (ref 0–6)
GLUCOSE SERPL-MCNC: 111 MG/DL — HIGH (ref 70–99)
HCT VFR BLD CALC: 38 % — SIGNIFICANT CHANGE UP (ref 34.5–45)
HGB BLD-MCNC: 12 G/DL — SIGNIFICANT CHANGE UP (ref 11.5–15.5)
IMM GRANULOCYTES NFR BLD AUTO: 0.3 % — SIGNIFICANT CHANGE UP (ref 0–1.5)
INR BLD: 1.03 RATIO — SIGNIFICANT CHANGE UP (ref 0.88–1.16)
LYMPHOCYTES # BLD AUTO: 0.82 K/UL — LOW (ref 1–3.3)
LYMPHOCYTES # BLD AUTO: 5.7 % — LOW (ref 13–44)
MCHC RBC-ENTMCNC: 26.2 PG — LOW (ref 27–34)
MCHC RBC-ENTMCNC: 31.6 GM/DL — LOW (ref 32–36)
MCV RBC AUTO: 83 FL — SIGNIFICANT CHANGE UP (ref 80–100)
MONOCYTES # BLD AUTO: 0.84 K/UL — SIGNIFICANT CHANGE UP (ref 0–0.9)
MONOCYTES NFR BLD AUTO: 5.9 % — SIGNIFICANT CHANGE UP (ref 2–14)
NEUTROPHILS # BLD AUTO: 12.42 K/UL — HIGH (ref 1.8–7.4)
NEUTROPHILS NFR BLD AUTO: 86.9 % — HIGH (ref 43–77)
PLATELET # BLD AUTO: 248 K/UL — SIGNIFICANT CHANGE UP (ref 150–400)
POTASSIUM SERPL-MCNC: 3.8 MMOL/L — SIGNIFICANT CHANGE UP (ref 3.5–5.3)
POTASSIUM SERPL-SCNC: 3.8 MMOL/L — SIGNIFICANT CHANGE UP (ref 3.5–5.3)
PROT SERPL-MCNC: 8.1 GM/DL — SIGNIFICANT CHANGE UP (ref 6–8.3)
PROTHROM AB SERPL-ACNC: 11.4 SEC — SIGNIFICANT CHANGE UP (ref 10–12.9)
RBC # BLD: 4.58 M/UL — SIGNIFICANT CHANGE UP (ref 3.8–5.2)
RBC # FLD: 15.9 % — HIGH (ref 10.3–14.5)
SODIUM SERPL-SCNC: 135 MMOL/L — SIGNIFICANT CHANGE UP (ref 135–145)
TROPONIN I SERPL-MCNC: <0.015 NG/ML — SIGNIFICANT CHANGE UP (ref 0.01–0.04)
WBC # BLD: 14.3 K/UL — HIGH (ref 3.8–10.5)
WBC # FLD AUTO: 14.3 K/UL — HIGH (ref 3.8–10.5)

## 2020-01-12 PROCEDURE — 85730 THROMBOPLASTIN TIME PARTIAL: CPT

## 2020-01-12 PROCEDURE — 80053 COMPREHEN METABOLIC PANEL: CPT

## 2020-01-12 PROCEDURE — 85025 COMPLETE CBC W/AUTO DIFF WBC: CPT

## 2020-01-12 PROCEDURE — 36415 COLL VENOUS BLD VENIPUNCTURE: CPT

## 2020-01-12 PROCEDURE — 70498 CT ANGIOGRAPHY NECK: CPT

## 2020-01-12 PROCEDURE — 99285 EMERGENCY DEPT VISIT HI MDM: CPT

## 2020-01-12 PROCEDURE — 93010 ELECTROCARDIOGRAM REPORT: CPT

## 2020-01-12 PROCEDURE — 99284 EMERGENCY DEPT VISIT MOD MDM: CPT | Mod: 25

## 2020-01-12 PROCEDURE — 70450 CT HEAD/BRAIN W/O DYE: CPT

## 2020-01-12 PROCEDURE — 93005 ELECTROCARDIOGRAM TRACING: CPT

## 2020-01-12 PROCEDURE — 70498 CT ANGIOGRAPHY NECK: CPT | Mod: 26

## 2020-01-12 PROCEDURE — 71046 X-RAY EXAM CHEST 2 VIEWS: CPT | Mod: 26

## 2020-01-12 PROCEDURE — 85610 PROTHROMBIN TIME: CPT

## 2020-01-12 PROCEDURE — 70496 CT ANGIOGRAPHY HEAD: CPT

## 2020-01-12 PROCEDURE — 71046 X-RAY EXAM CHEST 2 VIEWS: CPT

## 2020-01-12 PROCEDURE — 70450 CT HEAD/BRAIN W/O DYE: CPT | Mod: 26,59

## 2020-01-12 PROCEDURE — 70496 CT ANGIOGRAPHY HEAD: CPT | Mod: 26

## 2020-01-12 PROCEDURE — 84484 ASSAY OF TROPONIN QUANT: CPT

## 2020-01-12 RX ORDER — SODIUM CHLORIDE 9 MG/ML
1000 INJECTION INTRAMUSCULAR; INTRAVENOUS; SUBCUTANEOUS ONCE
Refills: 0 | Status: COMPLETED | OUTPATIENT
Start: 2020-01-12 | End: 2020-01-12

## 2020-01-12 RX ORDER — MECLIZINE HCL 12.5 MG
2 TABLET ORAL
Qty: 18 | Refills: 0
Start: 2020-01-12 | End: 2020-01-14

## 2020-01-12 RX ORDER — MECLIZINE HCL 12.5 MG
50 TABLET ORAL ONCE
Refills: 0 | Status: COMPLETED | OUTPATIENT
Start: 2020-01-12 | End: 2020-01-12

## 2020-01-12 RX ADMIN — SODIUM CHLORIDE 1000 MILLILITER(S): 9 INJECTION INTRAMUSCULAR; INTRAVENOUS; SUBCUTANEOUS at 15:37

## 2020-01-12 RX ADMIN — Medication 50 MILLIGRAM(S): at 15:37

## 2020-01-12 NOTE — ED PROVIDER NOTE - CLINICAL SUMMARY MEDICAL DECISION MAKING FREE TEXT BOX
69 yr old F with hx of HTN, HLD, CAD, presenting with syncopal episode now with dizziness, suspect episode was vasovagal in the setting of dizzy, lightheaded and diaphoretic. Will check labs to check for electrolyte abnormalities. Dizziness likely due to dehydration or peripheral vertigo. Lower suspicion for CVA but will get CTA head and neck and give meclizine for relief. Dispo pending results.

## 2020-01-12 NOTE — ED PROVIDER NOTE - NSFOLLOWUPINSTRUCTIONS_ED_ALL_ED_FT
Syncope    Syncope is when you temporarily lose consciousness, also called fainting or passing out. It is caused by a sudden decrease in blood flow to the brain. Even though most causes of syncope are not dangerous, syncope can possibly be a sign of a serious medical problem. Signs that you may be about to faint include feeling dizzy, lightheaded, nausea, visual changes, or cold/clammy skin. Do not drive, operate heavy machinery, or play sports until your health care provider says it is okay.    SEEK IMMEDIATE MEDICAL CARE IF YOU HAVE ANY OF THE FOLLOWING SYMPTOMS: severe headache, pain in your chest/abdomen/back, bleeding from your mouth or rectum, palpitations, shortness of breath, pain with breathing, seizure, confusion, or trouble walking.     follow up with neurology in the next week.     return to the er if symptoms recur or worsen.

## 2020-01-12 NOTE — ED PROVIDER NOTE - PROGRESS NOTE DETAILS
Called to evaluate possible neuro alert with syncope and fall Evaluated in triage and no signs obvious neuro deficits or signs of trauma and was taken to CT. Patient  re-evaluated in room and now noted to have dysmetria and ataxia, with some nystagmus that is extinguishable. Pt is able to ambulate but needs assistance.   Symptoms seem improved from onset. Suspect symptoms are peripheral in origin will try meclozine, but given risk factors, CTA ordered At thi point NIH is 4, and given improvement will not activate code stroke or administer tPA at this point. I Luis Hernandez attest that this documentation has been prepared under the direction and in the presence of Doctor Bazzi. Juan M BUTLER for ED attending Dr. Bazzi: Pt reassessed. Significant improvement of symptoms. Ambulatory without assistance. No more dizziness. Will continue to monitor, awaiting CT report. Eduarda Bazzi M.D: CTs without focal findings. pt still well, ambualting in ED. stable for dc at this time. repeat troponin negative as well. pt understands need to follow up with neurology for fruther evaluation of dizziness/vertigo.

## 2020-01-12 NOTE — ED PROVIDER NOTE - CROS ED NEURO POS
CHANGE IN LEVEL OF CONSCIOUSNESS/+dizziness +L sided numbness +dizziness +LUE heaviness/CHANGE IN LEVEL OF CONSCIOUSNESS

## 2020-01-12 NOTE — ED ADULT TRIAGE NOTE - CHIEF COMPLAINT QUOTE
Pt. to the ED BIBA and Daughter CO Unwitnessed Syncope Episode while at [employer]. Pt. reports falling to the floor + Head injury- GSC 15- Pt. on Full Aspirin- Dr. Bazzi to the bedside. Neuro Alert to the ED Called as ordered.

## 2020-01-12 NOTE — ED PROVIDER NOTE - CARE PROVIDER_API CALL
Lindsay Parr)  Neurology  [address] [facility], [address] [place] [address]  Phone: [phone]  Fax: [phone]  Follow Up Time: 4-6 Days

## 2020-01-12 NOTE — ED ADULT NURSE NOTE - CHIEF COMPLAINT QUOTE
Pt. to the ED BIBA and Daughter CO Unwitnessed Syncope Episode while at [employer]. Pt. reports falling to the floor + Head injury- GSC 15- Pt. on Full Aspirin- Dr. Bazzi to the bedside. Neuro Alert to the ED Called as ordered.

## 2020-01-12 NOTE — ED ADULT NURSE REASSESSMENT NOTE - NS ED NURSE REASSESS COMMENT FT1
pt received in bed, alert and oriented x4, states dizziness has relieved. Family at pt bedside, cardiac monitoring in progress, safety maintained

## 2020-01-12 NOTE — ED ADULT NURSE NOTE - NS ED NURSE DC PT EDUCATION RESOURCES
Caller would like to discuss an/a Appointment. Writer advised caller of callback within 24-72 hours.    Patient Name: Amy Talley  Caller Name: Patient  Name of Facility:   Callback Number: [phone]  Best Availability: Mornings  Fax Number:   Additional Info: The patient stated that she was returning the call for the following below. Please advise.  Did you confirm the message with the caller?: yes    Thank you,  Vikram Chacon     Yes

## 2020-01-12 NOTE — ED PROVIDER NOTE - PATIENT PORTAL LINK FT
You can access the FollowMyHealth Patient Portal offered by [facility] by registering at the following website: [email or web address]. By joining [facility]’s FollowMyHealth portal, you will also be able to view your health information using other applications (apps) compatible with our system.

## 2020-01-12 NOTE — ED PROVIDER NOTE - NS_ ATTENDINGSCRIBEDETAILS _ED_A_ED_FT
Eduarda Bazzi M.D.: The history, relevant review of systems, past medical and surgical history, medical decision making, and physical examination was documented by the scribe in my presence and I attest to the accuracy of the documentation .

## 2020-01-12 NOTE — ED PROVIDER NOTE - OBJECTIVE STATEMENT
69 yr old F with PMHx of CAD, Chronic Gastritis, Dyslipidemia, HTN, Incisional hernia, Kidney stone, Squamous Cell Carcinoma, Uterine Cancer presenting to the ED s/p syncope. Pt states that she was in [employer] when she suddenly started to have new onset dizziness, diaphoresis, and when she went to go get water she passed out. +head strike. Patient is on full aspirin. States that she still feels dizzy that she describes as room spinning, and generalized weakness, and LUE heaviness. Denies any neck pain, HA, back pain, blurry vision, N/V/D.

## 2020-01-12 NOTE — ED PROVIDER NOTE - PMH
CAD (coronary artery disease)    Chest Pain (ICD9 786.50)    Chronic Gastritis (ICD9 535.10)    Dyslipidemia (ICD9 272.4)    H/O: Osteoarthritis (ICD9 V13.4)    History of PSVT (paroxysmal supraventricular tachycardia)    HTN - Hypertension    Incisional hernia    Kidney stone    SCC (Squamous Cell Carcinoma) (ICD9 173.9)  Head and neck 2009 treated with Chem/Radiation/ LN removal  Uterine Cancer (ICD9 179)  no chemo no radiation

## 2020-02-04 ENCOUNTER — OUTPATIENT (OUTPATIENT)
Dept: OUTPATIENT SERVICES | Facility: HOSPITAL | Age: 70
LOS: 1 days | End: 2020-02-04
Payer: COMMERCIAL

## 2020-02-04 ENCOUNTER — APPOINTMENT (OUTPATIENT)
Dept: MAMMOGRAPHY | Facility: CLINIC | Age: 70
End: 2020-02-04
Payer: COMMERCIAL

## 2020-02-04 DIAGNOSIS — Z98.890 OTHER SPECIFIED POSTPROCEDURAL STATES: Chronic | ICD-10-CM

## 2020-02-04 DIAGNOSIS — Z00.8 ENCOUNTER FOR OTHER GENERAL EXAMINATION: ICD-10-CM

## 2020-02-04 PROCEDURE — 77063 BREAST TOMOSYNTHESIS BI: CPT | Mod: 26

## 2020-02-04 PROCEDURE — 77063 BREAST TOMOSYNTHESIS BI: CPT

## 2020-02-04 PROCEDURE — 77067 SCR MAMMO BI INCL CAD: CPT

## 2020-02-04 PROCEDURE — 77067 SCR MAMMO BI INCL CAD: CPT | Mod: 26

## 2020-05-21 ENCOUNTER — APPOINTMENT (OUTPATIENT)
Dept: NEUROLOGY | Facility: HOSPITAL | Age: 70
End: 2020-05-21

## 2020-08-14 ENCOUNTER — APPOINTMENT (OUTPATIENT)
Dept: RADIOLOGY | Facility: CLINIC | Age: 70
End: 2020-08-14
Payer: COMMERCIAL

## 2020-08-14 ENCOUNTER — OUTPATIENT (OUTPATIENT)
Dept: OUTPATIENT SERVICES | Facility: HOSPITAL | Age: 70
LOS: 1 days | End: 2020-08-14
Payer: COMMERCIAL

## 2020-08-14 DIAGNOSIS — M54.9 DORSALGIA, UNSPECIFIED: ICD-10-CM

## 2020-08-14 DIAGNOSIS — Z98.890 OTHER SPECIFIED POSTPROCEDURAL STATES: Chronic | ICD-10-CM

## 2020-08-14 PROCEDURE — 72100 X-RAY EXAM L-S SPINE 2/3 VWS: CPT

## 2020-08-14 PROCEDURE — 72100 X-RAY EXAM L-S SPINE 2/3 VWS: CPT | Mod: 26

## 2020-08-14 PROCEDURE — 73502 X-RAY EXAM HIP UNI 2-3 VIEWS: CPT | Mod: 26,LT

## 2020-08-14 PROCEDURE — 73502 X-RAY EXAM HIP UNI 2-3 VIEWS: CPT

## 2020-12-21 PROBLEM — Z87.440 HISTORY OF URINARY TRACT INFECTION: Status: RESOLVED | Noted: 2019-09-09 | Resolved: 2020-12-21

## 2022-01-01 NOTE — ASU PREOP CHECKLIST - VERIFY SURGICAL SITE/SIDE WITH PATIENT
Interval HPI / Overnight events:   Female Single liveborn, born in hospital, delivered by  delivery     born at 37.2 weeks gestation, now 2d old.  No acute events overnight.     Acceptable feeding / voiding / stooling patterns for age    Physical Exam:   Current Weight Gm 2570 (22 @ 20:45)    Weight Change Percentage: -4.46 (22 @ 20:45)      Vitals stable    Physical exam unchanged from prior exam, except as noted:   no jaundice  no murmur     Laboratory & Imaging Studies:   POCT Blood Glucose.: 82 mg/dL (22 @ 21:00)      Site: Sternum (22 @ 20:45)  Bilirubin: 5.2 (22 @ 20:45)  at 24 hrs of life low intermediate risk           Assessment and Plan of Care:     [x ] Normal / Healthy [place]  [x ] Hypoglycemia Protocol for infant of a diabetic mother completed and normal   [ ] [place]+  [ ] Need for observation/evaluation of  for sepsis: vital signs q4 hrs x 36 hrs  [ ] Other:     Family Discussion:   [x ]Feeding and baby weight loss were discussed today. Parent questions were answered  [ ]Other items discussed:   [ ]Unable to speak with family today due to maternal condition
done

## 2023-05-24 NOTE — ED PROVIDER NOTE - NIH STROKE SCALE 1B. LOC QUESTION
Chronic  - Continue home ** No (0) Answers both questions correctly Chronic  - Continue home lisinopril and metoprolol with hold parameters   - monitor hemodynamics

## 2023-10-30 ENCOUNTER — INPATIENT (INPATIENT)
Facility: HOSPITAL | Age: 73
LOS: 2 days | Discharge: HOME | DRG: 249 | End: 2023-11-02
Attending: HOSPITALIST | Admitting: INTERNAL MEDICINE
Payer: MEDICAID

## 2023-10-30 VITALS
HEART RATE: 83 BPM | RESPIRATION RATE: 18 BRPM | HEIGHT: 63 IN | WEIGHT: 154.98 LBS | TEMPERATURE: 98 F | SYSTOLIC BLOOD PRESSURE: 131 MMHG | OXYGEN SATURATION: 100 % | DIASTOLIC BLOOD PRESSURE: 56 MMHG

## 2023-10-30 DIAGNOSIS — Z98.890 OTHER SPECIFIED POSTPROCEDURAL STATES: Chronic | ICD-10-CM

## 2023-10-30 LAB
ALBUMIN SERPL ELPH-MCNC: 3.9 G/DL — SIGNIFICANT CHANGE UP (ref 3.3–5)
ALBUMIN SERPL ELPH-MCNC: 3.9 G/DL — SIGNIFICANT CHANGE UP (ref 3.3–5)
ALP SERPL-CCNC: 87 U/L — SIGNIFICANT CHANGE UP (ref 40–120)
ALP SERPL-CCNC: 87 U/L — SIGNIFICANT CHANGE UP (ref 40–120)
ALT FLD-CCNC: 19 U/L — SIGNIFICANT CHANGE UP (ref 12–78)
ALT FLD-CCNC: 19 U/L — SIGNIFICANT CHANGE UP (ref 12–78)
ANION GAP SERPL CALC-SCNC: 10 MMOL/L — SIGNIFICANT CHANGE UP (ref 5–17)
ANION GAP SERPL CALC-SCNC: 10 MMOL/L — SIGNIFICANT CHANGE UP (ref 5–17)
APPEARANCE UR: CLEAR — SIGNIFICANT CHANGE UP
APPEARANCE UR: CLEAR — SIGNIFICANT CHANGE UP
APTT BLD: 30.4 SEC — SIGNIFICANT CHANGE UP (ref 24.5–35.6)
APTT BLD: 30.4 SEC — SIGNIFICANT CHANGE UP (ref 24.5–35.6)
AST SERPL-CCNC: 20 U/L — SIGNIFICANT CHANGE UP (ref 15–37)
AST SERPL-CCNC: 20 U/L — SIGNIFICANT CHANGE UP (ref 15–37)
BASOPHILS # BLD AUTO: 0.03 K/UL — SIGNIFICANT CHANGE UP (ref 0–0.2)
BASOPHILS # BLD AUTO: 0.03 K/UL — SIGNIFICANT CHANGE UP (ref 0–0.2)
BASOPHILS NFR BLD AUTO: 0.4 % — SIGNIFICANT CHANGE UP (ref 0–2)
BASOPHILS NFR BLD AUTO: 0.4 % — SIGNIFICANT CHANGE UP (ref 0–2)
BILIRUB SERPL-MCNC: 0.4 MG/DL — SIGNIFICANT CHANGE UP (ref 0.2–1.2)
BILIRUB SERPL-MCNC: 0.4 MG/DL — SIGNIFICANT CHANGE UP (ref 0.2–1.2)
BILIRUB UR-MCNC: NEGATIVE — SIGNIFICANT CHANGE UP
BILIRUB UR-MCNC: NEGATIVE — SIGNIFICANT CHANGE UP
BLD GP AB SCN SERPL QL: SIGNIFICANT CHANGE UP
BLD GP AB SCN SERPL QL: SIGNIFICANT CHANGE UP
BUN SERPL-MCNC: 14 MG/DL — SIGNIFICANT CHANGE UP (ref 7–23)
BUN SERPL-MCNC: 14 MG/DL — SIGNIFICANT CHANGE UP (ref 7–23)
CALCIUM SERPL-MCNC: 9.3 MG/DL — SIGNIFICANT CHANGE UP (ref 8.5–10.1)
CALCIUM SERPL-MCNC: 9.3 MG/DL — SIGNIFICANT CHANGE UP (ref 8.5–10.1)
CHLORIDE SERPL-SCNC: 102 MMOL/L — SIGNIFICANT CHANGE UP (ref 96–108)
CHLORIDE SERPL-SCNC: 102 MMOL/L — SIGNIFICANT CHANGE UP (ref 96–108)
CO2 SERPL-SCNC: 24 MMOL/L — SIGNIFICANT CHANGE UP (ref 22–31)
CO2 SERPL-SCNC: 24 MMOL/L — SIGNIFICANT CHANGE UP (ref 22–31)
COLOR SPEC: YELLOW — SIGNIFICANT CHANGE UP
COLOR SPEC: YELLOW — SIGNIFICANT CHANGE UP
CREAT SERPL-MCNC: 0.84 MG/DL — SIGNIFICANT CHANGE UP (ref 0.5–1.3)
CREAT SERPL-MCNC: 0.84 MG/DL — SIGNIFICANT CHANGE UP (ref 0.5–1.3)
DIFF PNL FLD: NEGATIVE — SIGNIFICANT CHANGE UP
DIFF PNL FLD: NEGATIVE — SIGNIFICANT CHANGE UP
EGFR: 74 ML/MIN/1.73M2 — SIGNIFICANT CHANGE UP
EGFR: 74 ML/MIN/1.73M2 — SIGNIFICANT CHANGE UP
EOSINOPHIL # BLD AUTO: 0.01 K/UL — SIGNIFICANT CHANGE UP (ref 0–0.5)
EOSINOPHIL # BLD AUTO: 0.01 K/UL — SIGNIFICANT CHANGE UP (ref 0–0.5)
EOSINOPHIL NFR BLD AUTO: 0.1 % — SIGNIFICANT CHANGE UP (ref 0–6)
EOSINOPHIL NFR BLD AUTO: 0.1 % — SIGNIFICANT CHANGE UP (ref 0–6)
GLUCOSE SERPL-MCNC: 139 MG/DL — HIGH (ref 70–99)
GLUCOSE SERPL-MCNC: 139 MG/DL — HIGH (ref 70–99)
GLUCOSE UR QL: NEGATIVE MG/DL — SIGNIFICANT CHANGE UP
GLUCOSE UR QL: NEGATIVE MG/DL — SIGNIFICANT CHANGE UP
HCT VFR BLD CALC: 36 % — SIGNIFICANT CHANGE UP (ref 34.5–45)
HCT VFR BLD CALC: 36 % — SIGNIFICANT CHANGE UP (ref 34.5–45)
HGB BLD-MCNC: 11.9 G/DL — SIGNIFICANT CHANGE UP (ref 11.5–15.5)
HGB BLD-MCNC: 11.9 G/DL — SIGNIFICANT CHANGE UP (ref 11.5–15.5)
IMM GRANULOCYTES NFR BLD AUTO: 0.2 % — SIGNIFICANT CHANGE UP (ref 0–0.9)
IMM GRANULOCYTES NFR BLD AUTO: 0.2 % — SIGNIFICANT CHANGE UP (ref 0–0.9)
INR BLD: 1 RATIO — SIGNIFICANT CHANGE UP (ref 0.85–1.18)
INR BLD: 1 RATIO — SIGNIFICANT CHANGE UP (ref 0.85–1.18)
KETONES UR-MCNC: ABNORMAL MG/DL
KETONES UR-MCNC: ABNORMAL MG/DL
LEUKOCYTE ESTERASE UR-ACNC: NEGATIVE — SIGNIFICANT CHANGE UP
LEUKOCYTE ESTERASE UR-ACNC: NEGATIVE — SIGNIFICANT CHANGE UP
LYMPHOCYTES # BLD AUTO: 0.54 K/UL — LOW (ref 1–3.3)
LYMPHOCYTES # BLD AUTO: 0.54 K/UL — LOW (ref 1–3.3)
LYMPHOCYTES # BLD AUTO: 6.5 % — LOW (ref 13–44)
LYMPHOCYTES # BLD AUTO: 6.5 % — LOW (ref 13–44)
MCHC RBC-ENTMCNC: 27.1 PG — SIGNIFICANT CHANGE UP (ref 27–34)
MCHC RBC-ENTMCNC: 27.1 PG — SIGNIFICANT CHANGE UP (ref 27–34)
MCHC RBC-ENTMCNC: 33.1 GM/DL — SIGNIFICANT CHANGE UP (ref 32–36)
MCHC RBC-ENTMCNC: 33.1 GM/DL — SIGNIFICANT CHANGE UP (ref 32–36)
MCV RBC AUTO: 82 FL — SIGNIFICANT CHANGE UP (ref 80–100)
MCV RBC AUTO: 82 FL — SIGNIFICANT CHANGE UP (ref 80–100)
MONOCYTES # BLD AUTO: 0.27 K/UL — SIGNIFICANT CHANGE UP (ref 0–0.9)
MONOCYTES # BLD AUTO: 0.27 K/UL — SIGNIFICANT CHANGE UP (ref 0–0.9)
MONOCYTES NFR BLD AUTO: 3.3 % — SIGNIFICANT CHANGE UP (ref 2–14)
MONOCYTES NFR BLD AUTO: 3.3 % — SIGNIFICANT CHANGE UP (ref 2–14)
NEUTROPHILS # BLD AUTO: 7.42 K/UL — HIGH (ref 1.8–7.4)
NEUTROPHILS # BLD AUTO: 7.42 K/UL — HIGH (ref 1.8–7.4)
NEUTROPHILS NFR BLD AUTO: 89.5 % — HIGH (ref 43–77)
NEUTROPHILS NFR BLD AUTO: 89.5 % — HIGH (ref 43–77)
NITRITE UR-MCNC: NEGATIVE — SIGNIFICANT CHANGE UP
NITRITE UR-MCNC: NEGATIVE — SIGNIFICANT CHANGE UP
PH UR: 7.5 — SIGNIFICANT CHANGE UP (ref 5–8)
PH UR: 7.5 — SIGNIFICANT CHANGE UP (ref 5–8)
PLATELET # BLD AUTO: 206 K/UL — SIGNIFICANT CHANGE UP (ref 150–400)
PLATELET # BLD AUTO: 206 K/UL — SIGNIFICANT CHANGE UP (ref 150–400)
POTASSIUM SERPL-MCNC: 3.1 MMOL/L — LOW (ref 3.5–5.3)
POTASSIUM SERPL-MCNC: 3.1 MMOL/L — LOW (ref 3.5–5.3)
POTASSIUM SERPL-SCNC: 3.1 MMOL/L — LOW (ref 3.5–5.3)
POTASSIUM SERPL-SCNC: 3.1 MMOL/L — LOW (ref 3.5–5.3)
PROT SERPL-MCNC: 8.1 GM/DL — SIGNIFICANT CHANGE UP (ref 6–8.3)
PROT SERPL-MCNC: 8.1 GM/DL — SIGNIFICANT CHANGE UP (ref 6–8.3)
PROT UR-MCNC: SIGNIFICANT CHANGE UP MG/DL
PROT UR-MCNC: SIGNIFICANT CHANGE UP MG/DL
PROTHROM AB SERPL-ACNC: 11.3 SEC — SIGNIFICANT CHANGE UP (ref 9.5–13)
PROTHROM AB SERPL-ACNC: 11.3 SEC — SIGNIFICANT CHANGE UP (ref 9.5–13)
RBC # BLD: 4.39 M/UL — SIGNIFICANT CHANGE UP (ref 3.8–5.2)
RBC # BLD: 4.39 M/UL — SIGNIFICANT CHANGE UP (ref 3.8–5.2)
RBC # FLD: 14.9 % — HIGH (ref 10.3–14.5)
RBC # FLD: 14.9 % — HIGH (ref 10.3–14.5)
SODIUM SERPL-SCNC: 136 MMOL/L — SIGNIFICANT CHANGE UP (ref 135–145)
SODIUM SERPL-SCNC: 136 MMOL/L — SIGNIFICANT CHANGE UP (ref 135–145)
SP GR SPEC: >1.03 — HIGH (ref 1–1.03)
SP GR SPEC: >1.03 — HIGH (ref 1–1.03)
TROPONIN I, HIGH SENSITIVITY RESULT: 13.08 NG/L — SIGNIFICANT CHANGE UP
TROPONIN I, HIGH SENSITIVITY RESULT: 13.08 NG/L — SIGNIFICANT CHANGE UP
UROBILINOGEN FLD QL: 0.2 MG/DL — SIGNIFICANT CHANGE UP (ref 0.2–1)
UROBILINOGEN FLD QL: 0.2 MG/DL — SIGNIFICANT CHANGE UP (ref 0.2–1)
WBC # BLD: 8.29 K/UL — SIGNIFICANT CHANGE UP (ref 3.8–10.5)
WBC # BLD: 8.29 K/UL — SIGNIFICANT CHANGE UP (ref 3.8–10.5)
WBC # FLD AUTO: 8.29 K/UL — SIGNIFICANT CHANGE UP (ref 3.8–10.5)
WBC # FLD AUTO: 8.29 K/UL — SIGNIFICANT CHANGE UP (ref 3.8–10.5)

## 2023-10-30 PROCEDURE — 0042T: CPT | Mod: MA

## 2023-10-30 PROCEDURE — 70496 CT ANGIOGRAPHY HEAD: CPT | Mod: 26,MA

## 2023-10-30 PROCEDURE — 70450 CT HEAD/BRAIN W/O DYE: CPT | Mod: 26,MA,59

## 2023-10-30 PROCEDURE — 93010 ELECTROCARDIOGRAM REPORT: CPT

## 2023-10-30 PROCEDURE — 70498 CT ANGIOGRAPHY NECK: CPT | Mod: 26,MA

## 2023-10-30 PROCEDURE — 99291 CRITICAL CARE FIRST HOUR: CPT

## 2023-10-30 RX ORDER — OMEPRAZOLE 10 MG/1
0 CAPSULE, DELAYED RELEASE ORAL
Qty: 0 | Refills: 0 | DISCHARGE

## 2023-10-30 RX ORDER — POTASSIUM CHLORIDE 20 MEQ
40 PACKET (EA) ORAL ONCE
Refills: 0 | Status: COMPLETED | OUTPATIENT
Start: 2023-10-30 | End: 2023-10-30

## 2023-10-30 RX ORDER — SODIUM CHLORIDE 9 MG/ML
1000 INJECTION INTRAMUSCULAR; INTRAVENOUS; SUBCUTANEOUS ONCE
Refills: 0 | Status: COMPLETED | OUTPATIENT
Start: 2023-10-30 | End: 2023-10-30

## 2023-10-30 RX ORDER — ASPIRIN/CALCIUM CARB/MAGNESIUM 324 MG
0 TABLET ORAL
Qty: 0 | Refills: 0 | DISCHARGE

## 2023-10-30 RX ADMIN — SODIUM CHLORIDE 1000 MILLILITER(S): 9 INJECTION INTRAMUSCULAR; INTRAVENOUS; SUBCUTANEOUS at 23:00

## 2023-10-30 NOTE — ED PROVIDER NOTE - PROGRESS NOTE DETAILS
PA note appreciated. pt with hx htn, neck cancer treatd with radiation and surgery 17 years ago, who developed nausea this am. Pt states had toast then developed abd pain nonbloody vomiting and diarrhea. Pt called daughter who was due to come home early. Daughter went to kitchen to make pt some soup . Pt felt like she was going to have diarrhea, got up from bed got lightheaded and passed out face down on floor. Daughter states took about half hour until she became fully responsive. No tonic clonic movements. Better now. Alert  heart rrr lungs clear abd soft pos bs nt ext no edema. CN intact moving all ext. pt initially called as code stroke due to dec mental status. Probable vasovagal episode due to diarrhea and dehydration. pt found to have plaque in her arteries on ct angio but no acute stroke. Will admit. PA note appreciated. pt with hx htn,psvt, neck cancer treated with radiation and surgery 17 years ago, who developed nausea this am. Pt states had toast then developed abd pain nonbloody vomiting and diarrhea. Pt called daughter who was due to come home early. Daughter went to kitchen to make pt some soup . Pt felt like she was going to have diarrhea, got up from bed got lightheaded and passed out face down on floor. Daughter states took about half hour until she became fully responsive. No tonic clonic movements. Better now. Alert scar right neck heart rrr lungs clear abd soft pos bs nt ext no edema. CN intact moving all ext. pt initially called as code stroke due to dec mental status. Probable vasovagal episode due to diarrhea and dehydration. pt found to have plaque in her arteries on ct angio but no acute stroke. Will admit. ,

## 2023-10-30 NOTE — ED ADULT NURSE NOTE - NSFALLHARMRISKINTERV_ED_ALL_ED

## 2023-10-30 NOTE — ED ADULT NURSE NOTE - OBJECTIVE STATEMENT
Alert, denies pain, c/o bilat LE cramping.   Code stroke called 2/2 lethargy, unresponsiveness s/p unwitnessed fall. Pt denies + pos headstrike, + LOC. States she felt her legs cramp prior to fall. Endorses n/v, emesis in the morning. Pt sent to CT Alert, denies pain, c/o bilat LE cramping.   Code stroke called 2/2 lethargy, unresponsiveness s/p unwitnessed fall. Pt denies + pos headstrike, + LOC. States she felt her legs cramping prior to fall. Endorses n/v, emesis in the morning. Pt sent to CT. No residual weakness noted. Neuro WNL

## 2023-10-30 NOTE — ED ADULT TRIAGE NOTE - CHIEF COMPLAINT QUOTE
pt bibems from home s/p unwitnessed  fall, As per daughter, "I walked out of the room for 10 minutes and I found her on the floor, she has been n/v and abdominal pain all day". Pt lethargic at triage, A&ox1, with no hx of dementia. - blood thinners. Code stroke called by SOPHIA Vinson @1945.  in triage. Allergy to penicillin

## 2023-10-30 NOTE — ED PROVIDER NOTE - NSICDXPASTMEDICALHX_GEN_ALL_CORE_FT
PAST MEDICAL HISTORY:  CAD (coronary artery disease)     Chest Pain (ICD9 786.50)     Chronic Gastritis (ICD9 535.10)     Dyslipidemia (ICD9 272.4)     H/O: Osteoarthritis (ICD9 V13.4)     History of PSVT (paroxysmal supraventricular tachycardia)     HTN - Hypertension     Incisional hernia     Kidney stone     SCC (Squamous Cell Carcinoma) (ICD9 173.9) Head and neck 2009 treated with Chem/Radiation/ LN removal    Uterine Cancer (ICD9 179) no chemo no radiation

## 2023-10-30 NOTE — ED PROVIDER NOTE - NSICDXPASTSURGICALHX_GEN_ALL_CORE_FT
PAST SURGICAL HISTORY:  H/O: Hysterectomy (ICD9 V88.01) at age 32    History of Cholecystectomy (ICD9 V45.79) 2007    History of Tonsillectomy (ICD9 V45.89)     Lymph Node Cancer (ICD9 196.9) removal of cervical LN due to head & neck squamous cell carcinoma ; 2009    S/P cardiac cath 2009

## 2023-10-30 NOTE — PHARMACOTHERAPY INTERVENTION NOTE - COMMENTS
Medication reconciliation completed.  Patient was unable to provide medication information, spoke to Bertha at bedside and they provided current medication list; confirmed with Dr. [facility].  Pt's pill bottles read fill dates of 1-30-23 and 4-13-23 but pt states that she does take her medication daily.

## 2023-10-30 NOTE — ED PROVIDER NOTE - OBJECTIVE STATEMENT
72 y/o female with PMHx of PSVT, CAD, HTN, and HLD BIBEMS to the ED s/p unwitnessed fall. Daughter at bedside reports pt has been feeling nauseous since today morning with multiple episodes of vomiting and diarrhea throughout the day. About 30 minutes PTA to the ED daughter was in the kitchen making dinner for pt, when went to go check on the pt found her on the floor face forward unresponsive with difficulty to be aroused, sluggish, and slow to respond. At the ED, pt arousable to verbal stimuli code stroke called pt taken directly to CT imaging. 74 y/o female with PMHx of PSVT, CAD, HTN, and HLD BIBEMS to the ED s/p unwitnessed fall. Daughter at bedside reports pt has been feeling nauseous since today morning with multiple episodes of vomiting and diarrhea throughout the day. About 30 minutes PTA to the ED daughter was in the kitchen making dinner for pt, when went to go check on the pt found her on the floor face downward unresponsive with difficulty to be aroused, sluggish, and slow to respond. At the ED, pt arousable to verbal stimuli code stroke called pt taken directly to CT imaging.

## 2023-10-30 NOTE — CHART NOTE - NSCHARTNOTEFT_GEN_A_CORE
HISTORY OF PRESENT ILLNESS:    73F w/ PMH of Dementia, presented after being found on the floor. Patient endored N/V all day prior.      NIHSS:   LKN: 7pm 10/30   Pre-stroke MRS: ?  CTH: neg  CTA h/n: neg  CTP: neg    HOME MEDICATIONS:  Home Medications:  amLODIPine 5 mg oral tablet: 1 tab(s) orally once a day (11 Oct 2019 09:15)  aspirin 325 mg oral tablet:  (11 Oct 2019 09:15)  lisinopril 10 mg oral tablet: 1 tab(s) orally once a day (11 Oct 2019 09:15)  metoprolol succinate 25 mg oral tablet, extended release: 1 tab(s) orally once a day (11 Oct 2019 09:15)  oxyCODONE 10 mg oral tablet: 1 tab(s) orally once, As needed, Severe Pain (7 - 10) (11 Oct 2019 09:15)  oxyCODONE 5 mg oral tablet: 1 tab(s) orally once, As needed, Moderate Pain (4 - 6) (11 Oct 2019 09:15)  PriLOSEC:  (11 Oct 2019 09:15)      SMOKING/ETOH/RECREATIONAL DRUGS:    VITALS/DATA/ORDERS: [x] Reviewed    Labs:  CAPILLARY BLOOD GLUCOSE      POCT Blood Glucose.: 138 mg/dL (30 Oct 2023 19:43)        CT Head:     EXAMINATION: Assisted by (OSH staff)  NIHSS:    NIHSS:  1A: Level of consciousness       0= Alert; keenly responsive    1B: Ask month and age       0= Both questions right    1C: "Blink eyes" and "Squeeze Hands"       0= Performs both     2: Horizontal EOMs       0= Normal    3: Visual fields       0= No visual loss     4: Facial palsy (use grimace if obtunded)       0= Normal symmetry    5A: Left arm motor drift (count out loud and use fingers to show count)       0= No drift x 10 seconds    5B: Right arm motor drift       0= No drift x 10 seconds    6A: Left leg motor drift       0= No drift x 5 seconds     6B: Right leg motor drift       0= No drift x 5 seconds    7: Limb ataxia (FNF/heel-shin)       0= No ataxia    8: Sensation       0= Normal, no sensory loss     9: Language/aphasia- describe the scene (on dontrell); name the items; read the sentences (on dontrell)       0= Normal, no aphasia    10: Dysarthria- read the words       0= Normal     11: Extinction/inattention       0= No abnormality           IV Alteplase CONTRAINDICATIONS  [] None    ABSOLUTE EXCLUSION CRITERIA:  [] Patient outside of the appropriate time window for IV alteplase  [] Evidence of intracranial hemorrhage on pretreatment CT scan (CT must be read by an attending radiologist or attending neurologist)  [] Head CT findings suggesting an established acute cerebral infarction as evidenced by arlen hypodensity, regardless of size.  [] Clinical presentation consistent with subarachnoid hemorrhage, even with normal CT scan  [] Active internal bleeding  [] Known bleeding diathesis (including but not limited to platelet count < 100,000, use of oral anticoagulants with INR>1.7, use of full dose low molecular       weight heparin within the last 24 hours, use of unfractionated heparin AND a prolonged PTT (> 40sec), use of direct thrombin inhibitor (e.g. dabigatran) or oral direct Factor Xa inhibitor (e.g. rivaroxiban, apixaban) within 48 hours)  [] Systolic pressure >= 185 mmHg or diastolic pressure 110 mmHg on repeated measurements at the time treatment is to begin  [] Care team unable to determine eligibility for IV alteplase  [] Patient, family, or surrogate declines and understands risks and benefits of treatment.  [] For wake-up Protocol patients: DW-MRI lesions larger than one-third of the MCA territory    RELATIVE EXCLUSION CRITERIA    [x] Isolated neurological deficits (except for aphasia or hemianopsia)  [] stroke severity too mild (non-disabling)  [] Seizure at onset with post-ictal residual neurological impairment  [] Gastrointestinal, genitourinary or respiratory hemorrhage within 21 days   [] Major surgery within 14 days   [] Blood glucose level < 50 or > 400 mG/dL  [] CPR with chest compressions or minor surgery (including liver and kidney biopsy, thoracocentesis, lumbar puncture) within 10 days   [] Arterial puncture at non-compressible site within 7 days   [] Evidence of acute trauma (fracture)   [] Diabetic hemorrhagic retinopathy or ophthalmic bleeding  [] Pregnancy or peripartum; no nursing post- treatment  [] Post-myocardial infarction pericarditis  [] Peritoneal dialysis or hemodialysis or sever hepatic disease   [] Known bacterial endocarditis   [] Life expectancy less than 6 months or sever co-morbid illness   [] Known cerebral vascular malformation, untreated intracranial aneurysm or brain tumor (may consider IV alteplase in patients with CNS lesions that have a very low likelihood of hemorrhage, such as small un-ruptured aneurysms or benign tumors with low vascularity.  [] Social/Church reason  [] Other ____________________        ASSESSMENT:     No TNK given no disabling defecit  No MT as no LVO     REst of management per ER and neurological team at [facility]          Consultation provided via live, two-way video streaming. Consultation provided in patient's preferred language.

## 2023-10-30 NOTE — ED PROVIDER NOTE - SECONDARY DIAGNOSIS.
Report given to me by Chester Giraldo on this pt. midshift, Report given in SBAR format, Pt is stable, will continue care. Carotid atherosclerosis

## 2023-10-30 NOTE — ED PROVIDER NOTE - CONSTITUTIONAL, MLM
normal... Asleep but arousable to verbal stimuli. Oriented to person not place or time. No apparent distress.

## 2023-10-30 NOTE — ED PROVIDER NOTE - CRITICAL CARE ATTENDING CONTRIBUTION TO CARE
Critical care time spent evaluating and reevaluating patient, ordering and interpreting diagnostics, ordering therapeutics, reviewing old records, speaking to consulting and admitting MDs and documenting. This was a shared visit with SOPHIA Vinson. Agree with notes. Marquise MCCARTY

## 2023-10-30 NOTE — ED PROVIDER NOTE - CLINICAL SUMMARY MEDICAL DECISION MAKING FREE TEXT BOX
74 y/o female presenting s/p unwitnessed fall today, daughter notes pt lethargic and sluggish. Pt made code stroke, taken to CT for imaging. Will obtain telestroke consult, CT imaging, labs including troponin, coags, UA with culture, EKG, give IV fluids, and reevaluate for disposition.

## 2023-10-31 DIAGNOSIS — R55 SYNCOPE AND COLLAPSE: ICD-10-CM

## 2023-10-31 LAB
A1C WITH ESTIMATED AVERAGE GLUCOSE RESULT: 6 % — HIGH (ref 4–5.6)
A1C WITH ESTIMATED AVERAGE GLUCOSE RESULT: 6 % — HIGH (ref 4–5.6)
ANION GAP SERPL CALC-SCNC: 4 MMOL/L — LOW (ref 5–17)
ANION GAP SERPL CALC-SCNC: 4 MMOL/L — LOW (ref 5–17)
BASOPHILS # BLD AUTO: 0.03 K/UL — SIGNIFICANT CHANGE UP (ref 0–0.2)
BASOPHILS # BLD AUTO: 0.03 K/UL — SIGNIFICANT CHANGE UP (ref 0–0.2)
BASOPHILS NFR BLD AUTO: 0.4 % — SIGNIFICANT CHANGE UP (ref 0–2)
BASOPHILS NFR BLD AUTO: 0.4 % — SIGNIFICANT CHANGE UP (ref 0–2)
BUN SERPL-MCNC: 10 MG/DL — SIGNIFICANT CHANGE UP (ref 7–23)
BUN SERPL-MCNC: 10 MG/DL — SIGNIFICANT CHANGE UP (ref 7–23)
CALCIUM SERPL-MCNC: 8.5 MG/DL — SIGNIFICANT CHANGE UP (ref 8.5–10.1)
CALCIUM SERPL-MCNC: 8.5 MG/DL — SIGNIFICANT CHANGE UP (ref 8.5–10.1)
CHLORIDE SERPL-SCNC: 109 MMOL/L — HIGH (ref 96–108)
CHLORIDE SERPL-SCNC: 109 MMOL/L — HIGH (ref 96–108)
CHOLEST SERPL-MCNC: 182 MG/DL — SIGNIFICANT CHANGE UP
CHOLEST SERPL-MCNC: 182 MG/DL — SIGNIFICANT CHANGE UP
CO2 SERPL-SCNC: 27 MMOL/L — SIGNIFICANT CHANGE UP (ref 22–31)
CO2 SERPL-SCNC: 27 MMOL/L — SIGNIFICANT CHANGE UP (ref 22–31)
CREAT SERPL-MCNC: 0.71 MG/DL — SIGNIFICANT CHANGE UP (ref 0.5–1.3)
CREAT SERPL-MCNC: 0.71 MG/DL — SIGNIFICANT CHANGE UP (ref 0.5–1.3)
EGFR: 90 ML/MIN/1.73M2 — SIGNIFICANT CHANGE UP
EGFR: 90 ML/MIN/1.73M2 — SIGNIFICANT CHANGE UP
EOSINOPHIL # BLD AUTO: 0.02 K/UL — SIGNIFICANT CHANGE UP (ref 0–0.5)
EOSINOPHIL # BLD AUTO: 0.02 K/UL — SIGNIFICANT CHANGE UP (ref 0–0.5)
EOSINOPHIL NFR BLD AUTO: 0.3 % — SIGNIFICANT CHANGE UP (ref 0–6)
EOSINOPHIL NFR BLD AUTO: 0.3 % — SIGNIFICANT CHANGE UP (ref 0–6)
ESTIMATED AVERAGE GLUCOSE: 126 MG/DL — HIGH (ref 68–114)
ESTIMATED AVERAGE GLUCOSE: 126 MG/DL — HIGH (ref 68–114)
GI PCR PANEL: DETECTED
GI PCR PANEL: DETECTED
GLUCOSE BLDC GLUCOMTR-MCNC: 101 MG/DL — HIGH (ref 70–99)
GLUCOSE BLDC GLUCOMTR-MCNC: 101 MG/DL — HIGH (ref 70–99)
GLUCOSE BLDC GLUCOMTR-MCNC: 102 MG/DL — HIGH (ref 70–99)
GLUCOSE BLDC GLUCOMTR-MCNC: 102 MG/DL — HIGH (ref 70–99)
GLUCOSE BLDC GLUCOMTR-MCNC: 111 MG/DL — HIGH (ref 70–99)
GLUCOSE BLDC GLUCOMTR-MCNC: 111 MG/DL — HIGH (ref 70–99)
GLUCOSE SERPL-MCNC: 105 MG/DL — HIGH (ref 70–99)
GLUCOSE SERPL-MCNC: 105 MG/DL — HIGH (ref 70–99)
HCT VFR BLD CALC: 37.2 % — SIGNIFICANT CHANGE UP (ref 34.5–45)
HCT VFR BLD CALC: 37.2 % — SIGNIFICANT CHANGE UP (ref 34.5–45)
HCV AB S/CO SERPL IA: 0.08 S/CO — SIGNIFICANT CHANGE UP (ref 0–0.99)
HCV AB S/CO SERPL IA: 0.08 S/CO — SIGNIFICANT CHANGE UP (ref 0–0.99)
HCV AB SERPL-IMP: SIGNIFICANT CHANGE UP
HCV AB SERPL-IMP: SIGNIFICANT CHANGE UP
HDLC SERPL-MCNC: 65 MG/DL — SIGNIFICANT CHANGE UP
HDLC SERPL-MCNC: 65 MG/DL — SIGNIFICANT CHANGE UP
HGB BLD-MCNC: 12.1 G/DL — SIGNIFICANT CHANGE UP (ref 11.5–15.5)
HGB BLD-MCNC: 12.1 G/DL — SIGNIFICANT CHANGE UP (ref 11.5–15.5)
IMM GRANULOCYTES NFR BLD AUTO: 0.3 % — SIGNIFICANT CHANGE UP (ref 0–0.9)
IMM GRANULOCYTES NFR BLD AUTO: 0.3 % — SIGNIFICANT CHANGE UP (ref 0–0.9)
LIPID PNL WITH DIRECT LDL SERPL: 98 MG/DL — SIGNIFICANT CHANGE UP
LIPID PNL WITH DIRECT LDL SERPL: 98 MG/DL — SIGNIFICANT CHANGE UP
LYMPHOCYTES # BLD AUTO: 1.02 K/UL — SIGNIFICANT CHANGE UP (ref 1–3.3)
LYMPHOCYTES # BLD AUTO: 1.02 K/UL — SIGNIFICANT CHANGE UP (ref 1–3.3)
LYMPHOCYTES # BLD AUTO: 13.7 % — SIGNIFICANT CHANGE UP (ref 13–44)
LYMPHOCYTES # BLD AUTO: 13.7 % — SIGNIFICANT CHANGE UP (ref 13–44)
MAGNESIUM SERPL-MCNC: 2.1 MG/DL — SIGNIFICANT CHANGE UP (ref 1.6–2.6)
MAGNESIUM SERPL-MCNC: 2.1 MG/DL — SIGNIFICANT CHANGE UP (ref 1.6–2.6)
MCHC RBC-ENTMCNC: 27.3 PG — SIGNIFICANT CHANGE UP (ref 27–34)
MCHC RBC-ENTMCNC: 27.3 PG — SIGNIFICANT CHANGE UP (ref 27–34)
MCHC RBC-ENTMCNC: 32.5 GM/DL — SIGNIFICANT CHANGE UP (ref 32–36)
MCHC RBC-ENTMCNC: 32.5 GM/DL — SIGNIFICANT CHANGE UP (ref 32–36)
MCV RBC AUTO: 83.8 FL — SIGNIFICANT CHANGE UP (ref 80–100)
MCV RBC AUTO: 83.8 FL — SIGNIFICANT CHANGE UP (ref 80–100)
MONOCYTES # BLD AUTO: 0.52 K/UL — SIGNIFICANT CHANGE UP (ref 0–0.9)
MONOCYTES # BLD AUTO: 0.52 K/UL — SIGNIFICANT CHANGE UP (ref 0–0.9)
MONOCYTES NFR BLD AUTO: 7 % — SIGNIFICANT CHANGE UP (ref 2–14)
MONOCYTES NFR BLD AUTO: 7 % — SIGNIFICANT CHANGE UP (ref 2–14)
NEUTROPHILS # BLD AUTO: 5.81 K/UL — SIGNIFICANT CHANGE UP (ref 1.8–7.4)
NEUTROPHILS # BLD AUTO: 5.81 K/UL — SIGNIFICANT CHANGE UP (ref 1.8–7.4)
NEUTROPHILS NFR BLD AUTO: 78.3 % — HIGH (ref 43–77)
NEUTROPHILS NFR BLD AUTO: 78.3 % — HIGH (ref 43–77)
NON HDL CHOLESTEROL: 117 MG/DL — SIGNIFICANT CHANGE UP
NON HDL CHOLESTEROL: 117 MG/DL — SIGNIFICANT CHANGE UP
NOROVIRUS GI+II RNA STL QL NAA+NON-PROBE: DETECTED
NOROVIRUS GI+II RNA STL QL NAA+NON-PROBE: DETECTED
PLATELET # BLD AUTO: 196 K/UL — SIGNIFICANT CHANGE UP (ref 150–400)
PLATELET # BLD AUTO: 196 K/UL — SIGNIFICANT CHANGE UP (ref 150–400)
POTASSIUM SERPL-MCNC: 4.2 MMOL/L — SIGNIFICANT CHANGE UP (ref 3.5–5.3)
POTASSIUM SERPL-MCNC: 4.2 MMOL/L — SIGNIFICANT CHANGE UP (ref 3.5–5.3)
POTASSIUM SERPL-SCNC: 4.2 MMOL/L — SIGNIFICANT CHANGE UP (ref 3.5–5.3)
POTASSIUM SERPL-SCNC: 4.2 MMOL/L — SIGNIFICANT CHANGE UP (ref 3.5–5.3)
RAPID RVP RESULT: SIGNIFICANT CHANGE UP
RAPID RVP RESULT: SIGNIFICANT CHANGE UP
RBC # BLD: 4.44 M/UL — SIGNIFICANT CHANGE UP (ref 3.8–5.2)
RBC # BLD: 4.44 M/UL — SIGNIFICANT CHANGE UP (ref 3.8–5.2)
RBC # FLD: 15.2 % — HIGH (ref 10.3–14.5)
RBC # FLD: 15.2 % — HIGH (ref 10.3–14.5)
SARS-COV-2 RNA SPEC QL NAA+PROBE: SIGNIFICANT CHANGE UP
SARS-COV-2 RNA SPEC QL NAA+PROBE: SIGNIFICANT CHANGE UP
SODIUM SERPL-SCNC: 140 MMOL/L — SIGNIFICANT CHANGE UP (ref 135–145)
SODIUM SERPL-SCNC: 140 MMOL/L — SIGNIFICANT CHANGE UP (ref 135–145)
TRIGL SERPL-MCNC: 105 MG/DL — SIGNIFICANT CHANGE UP
TRIGL SERPL-MCNC: 105 MG/DL — SIGNIFICANT CHANGE UP
TROPONIN I, HIGH SENSITIVITY RESULT: 17.5 NG/L — SIGNIFICANT CHANGE UP
TROPONIN I, HIGH SENSITIVITY RESULT: 17.5 NG/L — SIGNIFICANT CHANGE UP
TROPONIN I, HIGH SENSITIVITY RESULT: 20.7 NG/L — SIGNIFICANT CHANGE UP
TROPONIN I, HIGH SENSITIVITY RESULT: 20.7 NG/L — SIGNIFICANT CHANGE UP
WBC # BLD: 7.42 K/UL — SIGNIFICANT CHANGE UP (ref 3.8–10.5)
WBC # BLD: 7.42 K/UL — SIGNIFICANT CHANGE UP (ref 3.8–10.5)
WBC # FLD AUTO: 7.42 K/UL — SIGNIFICANT CHANGE UP (ref 3.8–10.5)
WBC # FLD AUTO: 7.42 K/UL — SIGNIFICANT CHANGE UP (ref 3.8–10.5)

## 2023-10-31 PROCEDURE — 93880 EXTRACRANIAL BILAT STUDY: CPT | Mod: 26

## 2023-10-31 PROCEDURE — 85025 COMPLETE CBC W/AUTO DIFF WBC: CPT

## 2023-10-31 PROCEDURE — 70551 MRI BRAIN STEM W/O DYE: CPT

## 2023-10-31 PROCEDURE — 86803 HEPATITIS C AB TEST: CPT

## 2023-10-31 PROCEDURE — 83036 HEMOGLOBIN GLYCOSYLATED A1C: CPT

## 2023-10-31 PROCEDURE — 0225U NFCT DS DNA&RNA 21 SARSCOV2: CPT

## 2023-10-31 PROCEDURE — 97162 PT EVAL MOD COMPLEX 30 MIN: CPT | Mod: GP

## 2023-10-31 PROCEDURE — 99497 ADVNCD CARE PLAN 30 MIN: CPT | Mod: 25

## 2023-10-31 PROCEDURE — 99223 1ST HOSP IP/OBS HIGH 75: CPT

## 2023-10-31 PROCEDURE — 82962 GLUCOSE BLOOD TEST: CPT

## 2023-10-31 PROCEDURE — 93306 TTE W/DOPPLER COMPLETE: CPT

## 2023-10-31 PROCEDURE — 84484 ASSAY OF TROPONIN QUANT: CPT

## 2023-10-31 PROCEDURE — 74176 CT ABD & PELVIS W/O CONTRAST: CPT | Mod: 26

## 2023-10-31 PROCEDURE — 83735 ASSAY OF MAGNESIUM: CPT

## 2023-10-31 PROCEDURE — 97116 GAIT TRAINING THERAPY: CPT | Mod: GP

## 2023-10-31 PROCEDURE — 93880 EXTRACRANIAL BILAT STUDY: CPT

## 2023-10-31 PROCEDURE — 87507 IADNA-DNA/RNA PROBE TQ 12-25: CPT

## 2023-10-31 PROCEDURE — 74176 CT ABD & PELVIS W/O CONTRAST: CPT

## 2023-10-31 PROCEDURE — 70551 MRI BRAIN STEM W/O DYE: CPT | Mod: 26

## 2023-10-31 PROCEDURE — 72141 MRI NECK SPINE W/O DYE: CPT

## 2023-10-31 PROCEDURE — 93306 TTE W/DOPPLER COMPLETE: CPT | Mod: 26

## 2023-10-31 PROCEDURE — 80048 BASIC METABOLIC PNL TOTAL CA: CPT

## 2023-10-31 PROCEDURE — 36415 COLL VENOUS BLD VENIPUNCTURE: CPT

## 2023-10-31 PROCEDURE — 72141 MRI NECK SPINE W/O DYE: CPT | Mod: 26

## 2023-10-31 PROCEDURE — 99253 IP/OBS CNSLTJ NEW/EST LOW 45: CPT

## 2023-10-31 PROCEDURE — 80061 LIPID PANEL: CPT

## 2023-10-31 RX ORDER — LANOLIN ALCOHOL/MO/W.PET/CERES
3 CREAM (GRAM) TOPICAL AT BEDTIME
Refills: 0 | Status: DISCONTINUED | OUTPATIENT
Start: 2023-10-31 | End: 2023-11-02

## 2023-10-31 RX ORDER — ASPIRIN/CALCIUM CARB/MAGNESIUM 324 MG
81 TABLET ORAL DAILY
Refills: 0 | Status: DISCONTINUED | OUTPATIENT
Start: 2023-10-31 | End: 2023-11-02

## 2023-10-31 RX ORDER — LACTOBACILLUS ACIDOPHILUS 100MM CELL
1 CAPSULE ORAL
Refills: 0 | Status: DISCONTINUED | OUTPATIENT
Start: 2023-10-31 | End: 2023-11-02

## 2023-10-31 RX ORDER — SODIUM CHLORIDE 9 MG/ML
1000 INJECTION INTRAMUSCULAR; INTRAVENOUS; SUBCUTANEOUS
Refills: 0 | Status: DISCONTINUED | OUTPATIENT
Start: 2023-10-31 | End: 2023-10-31

## 2023-10-31 RX ORDER — ONDANSETRON 8 MG/1
4 TABLET, FILM COATED ORAL EVERY 8 HOURS
Refills: 0 | Status: DISCONTINUED | OUTPATIENT
Start: 2023-10-31 | End: 2023-11-02

## 2023-10-31 RX ORDER — LACTOBACILLUS ACIDOPHILUS 100MM CELL
1 CAPSULE ORAL
Refills: 0 | Status: DISCONTINUED | OUTPATIENT
Start: 2023-10-31 | End: 2023-10-31

## 2023-10-31 RX ORDER — PANTOPRAZOLE SODIUM 20 MG/1
40 TABLET, DELAYED RELEASE ORAL
Refills: 0 | Status: DISCONTINUED | OUTPATIENT
Start: 2023-10-31 | End: 2023-11-02

## 2023-10-31 RX ORDER — ENOXAPARIN SODIUM 100 MG/ML
40 INJECTION SUBCUTANEOUS EVERY 24 HOURS
Refills: 0 | Status: DISCONTINUED | OUTPATIENT
Start: 2023-10-31 | End: 2023-11-02

## 2023-10-31 RX ORDER — SODIUM CHLORIDE 9 MG/ML
1000 INJECTION INTRAMUSCULAR; INTRAVENOUS; SUBCUTANEOUS
Refills: 0 | Status: DISCONTINUED | OUTPATIENT
Start: 2023-10-31 | End: 2023-11-02

## 2023-10-31 RX ORDER — ATORVASTATIN CALCIUM 80 MG/1
40 TABLET, FILM COATED ORAL AT BEDTIME
Refills: 0 | Status: DISCONTINUED | OUTPATIENT
Start: 2023-10-31 | End: 2023-11-02

## 2023-10-31 RX ORDER — ACETAMINOPHEN 500 MG
650 TABLET ORAL EVERY 6 HOURS
Refills: 0 | Status: DISCONTINUED | OUTPATIENT
Start: 2023-10-31 | End: 2023-11-02

## 2023-10-31 RX ORDER — INFLUENZA VIRUS VACCINE 15; 15; 15; 15 UG/.5ML; UG/.5ML; UG/.5ML; UG/.5ML
0.7 SUSPENSION INTRAMUSCULAR ONCE
Refills: 0 | Status: DISCONTINUED | OUTPATIENT
Start: 2023-10-31 | End: 2023-11-02

## 2023-10-31 RX ORDER — METOPROLOL TARTRATE 50 MG
25 TABLET ORAL DAILY
Refills: 0 | Status: DISCONTINUED | OUTPATIENT
Start: 2023-10-31 | End: 2023-11-02

## 2023-10-31 RX ADMIN — PANTOPRAZOLE SODIUM 40 MILLIGRAM(S): 20 TABLET, DELAYED RELEASE ORAL at 07:31

## 2023-10-31 RX ADMIN — Medication 81 MILLIGRAM(S): at 10:27

## 2023-10-31 RX ADMIN — ENOXAPARIN SODIUM 40 MILLIGRAM(S): 100 INJECTION SUBCUTANEOUS at 07:30

## 2023-10-31 RX ADMIN — ATORVASTATIN CALCIUM 40 MILLIGRAM(S): 80 TABLET, FILM COATED ORAL at 22:08

## 2023-10-31 RX ADMIN — Medication 1 TABLET(S): at 17:07

## 2023-10-31 RX ADMIN — Medication 650 MILLIGRAM(S): at 11:15

## 2023-10-31 RX ADMIN — Medication 1 TABLET(S): at 07:31

## 2023-10-31 RX ADMIN — Medication 650 MILLIGRAM(S): at 10:27

## 2023-10-31 RX ADMIN — SODIUM CHLORIDE 100 MILLILITER(S): 9 INJECTION INTRAMUSCULAR; INTRAVENOUS; SUBCUTANEOUS at 07:31

## 2023-10-31 RX ADMIN — Medication 25 MILLIGRAM(S): at 10:27

## 2023-10-31 RX ADMIN — Medication 40 MILLIEQUIVALENT(S): at 00:32

## 2023-10-31 NOTE — PHYSICAL THERAPY INITIAL EVALUATION ADULT - RANGE OF MOTION EXAMINATION, REHAB EVAL
R shoulder flexion slightly limited, otherwise WFL/Left UE ROM was WFL (within functional limits)/bilateral lower extremity ROM was WFL (within functional limits)

## 2023-10-31 NOTE — H&P ADULT - NSHPPHYSICALEXAM_GEN_ALL_CORE
ICU Vital Signs Last 24 Hrs  T(C): 37.1 (31 Oct 2023 03:25), Max: 37.1 (31 Oct 2023 03:25)  T(F): 98.7 (31 Oct 2023 03:25), Max: 98.7 (31 Oct 2023 03:25)  HR: 84 (30 Oct 2023 23:00) (80 - 90)  BP: 124/70 (30 Oct 2023 23:00) (124/59 - 149/55)  BP(mean): 79 (30 Oct 2023 20:26) (79 - 116)  RR: 16 (30 Oct 2023 23:00) (14 - 18)  SpO2: 100% (30 Oct 2023 23:00) (100% - 100%)    O2 Parameters below as of 30 Oct 2023 23:00  Patient On (Oxygen Delivery Method): nasal cannula  O2 Flow (L/min): 2    General: Awake and alert, cooperative with exam. No acute distress.   Skin: Warm, dry, and pink.   Eyes: Pupils equal and reactive to light. Extraocular eye movements intact. No conjunctival injection, discharge, or scleral icterus.   HEENT: Atraumatic, normocephalic. Moist mucus membranes.   Cardiology: Normal S1, S2. Systolic ejection murmur. Regular rate and rhythm.   Respiratory: Lungs clear to ascultation bilaterally. Good air exchange. No wheezes, rales, or rhonchi. Normal chest expansion.   Gastrointestinal: Positive bowel sounds. Soft, non-distended. No guarding, rigidity, or rebound tenderness. No hepatosplenomegaly. Mild tenderness on palpation.   Musculoskeletal: 5/5 motor strength in all extremities. Normal range of motion.   Extremities: No peripheral edema bilaterally. Dorsalis pedis pulses 2+ bilaterally.   Neurological: A+Ox3 (person, place, and time). Cranial nerves 2-12 intact. Normal speech. No facial droop. No focal neurological deficits. No sensory deficits.   Psychiatric: Normal affect. Normal mood.

## 2023-10-31 NOTE — CONSULT NOTE ADULT - ASSESSMENT
74 yo female with multiple comorbidities s/p syncope, imaging negative for stroke, CTA neck positive for L ICA 50-79% stenosis.  CTA can overestimate stenosis level, recommend to obtain bilateral carotid duplex.    Recommendations:    Please c/w with best medical management ASA, statins, BP control  Carotid duplex  Please follow up neurology recommendations (MRI) to assess for embolic event  Vascular surgery will follow    Case discussed with Dr. Barrientos

## 2023-10-31 NOTE — PHYSICAL THERAPY INITIAL EVALUATION ADULT - FOLLOWS COMMANDS/ANSWERS QUESTIONS, REHAB EVAL
Angolan speaking primary, able to make needs known and follow commands in English./able to follow single-step instructions

## 2023-10-31 NOTE — H&P ADULT - HISTORY OF PRESENT ILLNESS
73-year-old female with past medical history of head and neck cancer (squamous cell carcinoma) s/p lymph node excision, endometrial cancer s/p hysterectomy, s/p cholecystectomy, s/p hernia repair, SVT, nonobstructive CAD, HTN, HLD presents with vomiting and diarrhea. Patient states that she had 6 episodes of nonbloody, watery stools and 6 episodes of vomiting today.  She did not urinate today.  Reports diffuse abdominal pain which is described as a soreness after having diarrhea and vomiting.  Patient states that she became dizzy at 6 PM and she lost consciousness.  Unsure if she hit her head.  Her daughter found her lying on the floor.  After she woke up she had some left hand numbness and difficulty expressing herself.  She states that her left hand is no longer numb.  Denies fevers, chest pain, shortness of breath, urinary symptoms, lower extremity swelling.    ER course: VSS.  Labs: Neutrophils 89.5%, potassium 3.1, glucose 139.  UA: Trace ketones.    EKG: pending, f/u result     Imaging:  - HEAD CT: Mild volume loss, microvascular disease, no acute hemorrhage or midline shift.  - CT PERFUSION demonstrated: No core infarct. No active ischemia. If symptoms persist consider follow up head CT or MRI, MRA if no contraindication.  - CTA COW: Patent intracranial circulation without flow limiting stenosis.  - CTA NECK: Calcified plaque with 50-69% stenosis at the ICA origins by NASCET criteria. The plaque on the left side is irregular and could be a source of emboli.Bilateral vertebral arteries are patent without flow limiting stenosis.    Patient was given 1 L of normal saline, continued on normal saline at 125 ml/hr, potassium 40 mEq. She is being admitted to telemetry for further management.

## 2023-10-31 NOTE — ED ADULT NURSE REASSESSMENT NOTE - NS ED NURSE REASSESS COMMENT FT1
Pt awake and alert. pt does not appear to be in discomfort or distress at this time. Pt states "I feel better." VSS and documented. GI PCR panel stool sample pending at this time. Cardiac monitor in place at SV rhythm at 75bpm. Pt updated on plan of care. Safety maintained, call bell within reach, bed at lowest position.
Pt c/o diarrhea at this time, MD Dempsey notified and aware. Pt has no other complaints at this time. Pt does not appear to be in discomfort or distress at this time. NIH stroke scale of 0 and documented. Pt is alert and follows commands. RVP sent to lab at this time. Pt updated on plan of care. Safety maintained, call bell within reach, bed in lowest position.
Received report from Claudia GAMEZ. Urine collected and sent. IVF running as ordered. Neuro check done, no deficits noted. VS remain stable as charted.
pt care assumed from previous RN. pt resting in bed, breathing and unlabored. arouses to voice, woken for VS, medication administration. VS as charted. pt is A&O x4. GCS 15. pt offered menu to order breakfast. needs met at this time. awaiting inpatient bed assignment.

## 2023-10-31 NOTE — CONSULT NOTE ADULT - SUBJECTIVE AND OBJECTIVE BOX
Patient is a 73y old  Female who presents with a chief complaint of vomiting and diarrhea (31 Oct 2023 10:42)      HPI:  NIHSS 1 for not alert  72 y/o female with PMHx of PSVT, CAD, HTN, and HLD BIBEMS to the ED s/p unwitnessed fall. Daughter at bedside reports pt has been feeling nauseous since today morning with multiple episodes of vomiting and diarrhea throughout the day. About 30 minutes PTA to the ED daughter was in the kitchen making dinner for pt, when went to go check on the pt found her on the floor face forward unresponsive with difficulty to be aroused, sluggish, and slow to respond. At the ED, pt arousable to verbal stimuli code stroke called pt taken directly to CT imaging.     PA note appreciated. pt with hx htn, neck cancer treatd with radiation and surgery 17 years ago, who developed nausea this am. Pt states had toast then developed abd pain nonbloody vomiting and diarrhea. Pt called daughter who was due to come home early. Daughter went to kitchen to make pt some soup . Pt felt like she was going to have diarrhea, got up from bed got lightheaded and passed out face down on floor. Daughter states took about half hour until she became fully responsive. No tonic clonic movements. Better now. Alert  heart rrr lungs clear abd soft pos bs nt ext no edema. CN intact moving all ext. pt initially called as code stroke due to dec mental status. Probable vasovagal episode due to diarrhea and dehydration. pt found to have plaque in her arteries on ct angio but no acute stroke. Will admit.    73-year-old female with past medical history of head and neck cancer (squamous cell carcinoma) s/p lymph node excision, endometrial cancer s/p hysterectomy, s/p cholecystectomy, s/p hernia repair, SVT, nonobstructive CAD, HTN, HLD presents with vomiting and diarrhea. Patient states that she had 6 episodes of nonbloody, watery stools and 6 episodes of vomiting today.  She did not urinate today.  Reports diffuse abdominal pain which is described as a soreness after having diarrhea and vomiting.  Patient states that she became dizzy at 6 PM and she lost consciousness.  Unsure if she hit her head.  Her daughter found her lying on the floor.  After she woke up she had some left hand numbness and difficulty expressing herself.  She states that her left hand is no longer numb.  Denies fevers, chest pain, shortness of breath, urinary symptoms, lower extremity swelling.    ER course: VSS.  Labs: Neutrophils 89.5%, potassium 3.1, glucose 139.  UA: Trace ketones.    EKG: pending, f/u result     Imaging:  - HEAD CT: Mild volume loss, microvascular disease, no acute hemorrhage or midline shift.  - CT PERFUSION demonstrated: No core infarct. No active ischemia. If symptoms persist consider follow up head CT or MRI, MRA if no contraindication.  - CTA COW: Patent intracranial circulation without flow limiting stenosis.  - CTA NECK: Calcified plaque with 50-69% stenosis at the ICA origins by NASCET criteria. The plaque on the left side is irregular and could be a source of emboli.Bilateral vertebral arteries are patent without flow limiting stenosis.    Patient was given 1 L of normal saline, continued on normal saline at 125 ml/hr, potassium 40 mEq. She is being admitted to telemetry for further management. (31 Oct 2023 03:28)      PAST MEDICAL & SURGICAL HISTORY:  SCC (Squamous Cell Carcinoma) (ICD9 173.9)  Head and neck 2009 treated with Chem/Radiation/ LN removal  Chronic Gastritis (ICD9 535.10)  Uterine Cancer (ICD9 179)  no chemo no radiation  HTN - Hypertension  Dyslipidemia (ICD9 272.4)  H/O: Osteoarthritis (ICD9 V13.4)  Chest Pain (ICD9 786.50)  CAD (coronary artery disease)  History of PSVT (paroxysmal supraventricular tachycardia)  Incisional hernia  Kidney stone  History of Cholecystectomy (ICD9 V45.79)  2007  History of Tonsillectomy (ICD9 V45.89)  H/O: Hysterectomy (ICD9 V88.01)  at age 32  Lymph Node Cancer (ICD9 196.9)  removal of cervical LN due to head & neck squamous cell carcinoma ; 2009  S/P cardiac cath  2009      FAMILY HISTORY:  FH: lung cancer (Mother)      Social Hx:  Nonsmoker, no drug or alcohol use    MEDICATIONS  (STANDING):  aspirin enteric coated 81 milliGRAM(s) Oral daily  atorvastatin 40 milliGRAM(s) Oral at bedtime  enoxaparin Injectable 40 milliGRAM(s) SubCutaneous every 24 hours  lactobacillus acidophilus 1 Tablet(s) Oral two times a day with meals  metoprolol succinate ER 25 milliGRAM(s) Oral daily  pantoprazole    Tablet 40 milliGRAM(s) Oral before breakfast  sodium chloride 0.9%. 1000 milliLiter(s) (100 mL/Hr) IV Continuous <Continuous>       Allergies  penicillins (Other)  penicillin (Anaphylaxis)        ROS: Pertinent positives in HPI, all other ROS were reviewed and are negative.      Vital Signs Last 24 Hrs  T(C): 36.9 (31 Oct 2023 08:32), Max: 37.1 (31 Oct 2023 03:25)  T(F): 98.5 (31 Oct 2023 08:32), Max: 98.7 (31 Oct 2023 03:25)  HR: 76 (31 Oct 2023 08:32) (68 - 96)  BP: 153/50 (31 Oct 2023 08:32) (116/50 - 153/50)  BP(mean): 75 (31 Oct 2023 08:32) (70 - 116)  RR: 18 (31 Oct 2023 08:32) (14 - 22)  SpO2: 98% (31 Oct 2023 08:32) (92% - 100%          Constitutional: awake and alert.  HEENT: PERRLA, EOMI,   Neck: Supple.  Respiratory: Breath sounds are clear bilaterally  Cardiovascular: S1 and S2, regular / irregular rhythm  Gastrointestinal: soft, nontender  Extremities:  no edema  Vascular: Caritid Bruit - no  Musculoskeletal: no joint swelling/tenderness, no abnormal movements  Skin: No rashes    Neurological exam:  HF: A x O x 3. Appropriately interactive, normal affect. Speech fluent, No Aphasia or paraphasic errors. Naming /repetition intact   CN: DEBRA, EOMI, VFF, facial sensation normal, no NLFD, tongue midline, Palate moves equally, SCM equal bilaterally  Motor: No pronator drift, Strength 5/5 in all 4 ext, normal bulk and tone, no tremor, rigidity or bradykinesia.    Sens: Intact to light touch / PP/ VS/ JS    Reflexes: Symmetric and normal . BJ 2+, BR 2+, KJ 2+, AJ 2+, downgoing toes b/l  Coord:  No FNFA, dysmetria, COBY intact   Gait/Balance: Normal/Cannot test    NIHSS:        Labs:   10-31    140  |  109<H>  |  10  ----------------------------<  105<H>  4.2   |  27  |  0.71    Ca    8.5      31 Oct 2023 04:00  Mg     2.1     10-31    TPro  8.1  /  Alb  3.9  /  TBili  0.4  /  DBili  x   /  AST  20  /  ALT  19  /  AlkPhos  87  10-30    10-31 Chol 182 LDL -- HDL 65 Trig 105                          12.1   7.42  )-----------( 196      ( 31 Oct 2023 04:00 )             37.2       Radiology:  CT Angio Neck Stroke Protocol w/ IV Cont (10.30.23 @ 20:03)     IMPRESSION:    HEAD CT: Mild volume loss, microvascular disease, no acute hemorrhage or   midline shift.    Discussed with Dr. Vinson in the ED at 8:07 PM.    CT PERFUSION demonstrated: No core infarct. No active ischemia.  If symptoms persist consider follow up head CT or MRI, MRA  if no   contraindication.    CTA COW:  Patent intracranial circulation without flow limiting stenosis.    CTA NECK: Calcified plaque with 50-69% stenosis at the ICA origins by   NASCET criteria. The plaque on the left side is irregular and could be a   source of emboli.  Bilateral vertebral arteries are patent without flow limiting stenosis.    Discussed with Dr. Vinson in the ED at 8:18 PM. MRI may be helpful for   further evaluation, ifclinically indicated.         CC: TIA      HPI:  73-year-old female with past medical history of head and neck cancer (squamous cell carcinoma) s/p lymph node excision, endometrial cancer s/p hysterectomy, s/p cholecystectomy, s/p hernia repair, SVT, nonobstructive CAD, HTN, HLD presented with vomiting and diarrhea. Patient states that she had 6 episodes of nonbloody, watery stools and 6 episodes of vomiting yesterday.    Patient states that she became sweaty and lightheaded/dizzy at 6 PM lost consciousness and fell forward on a carpeted floor.  Unsure if she hit her head.  Her daughter found her lying on the floor.  After she woke up she had some left fingers paresthesias, difficulty expressing herself lasting a half hour and L frontal/occipital/neck discomfort.  L fingers paresthesias is still slightly there.   Three months ago she had an episode of syncope without N/V/D. At that time paramedics came and she was stable and did not go to the hospital.  In 2020 she was at [facility] and had an episode of likely vasovagal syncope and was evaluated at  and discharged home.  Denies focal weakness/numbness, shaking, incontinence, tongue biting, slurred speech, facial droop.  In ED NIHSS was 1 for not being alert, and she was not given thrombolytics. CT head/CTA/CTP was neg for infarct/ischemia/hemorrhage.  There was 50-69% stenosis at ICA origins.        PAST MEDICAL & SURGICAL HISTORY:  SCC (Squamous Cell Carcinoma) (ICD9 173.9)  Head and neck 2009 treated with Chem/Radiation/ LN removal  Chronic Gastritis (ICD9 535.10)  Uterine Cancer (ICD9 179)  no chemo no radiation  HTN - Hypertension  Dyslipidemia (ICD9 272.4)  H/O: Osteoarthritis (ICD9 V13.4)  Chest Pain (ICD9 786.50)  CAD (coronary artery disease)  History of PSVT (paroxysmal supraventricular tachycardia)  Incisional hernia  Kidney stone  History of Cholecystectomy (ICD9 V45.79)  2007  History of Tonsillectomy (ICD9 V45.89)  H/O: Hysterectomy (ICD9 V88.01)  at age 32  Lymph Node Cancer (ICD9 196.9)  removal of cervical LN due to head & neck squamous cell carcinoma ; 2009  S/P cardiac cath  2009      FAMILY HISTORY:  FH: lung cancer (Mother)      Social Hx:  Nonsmoker, no drug or alcohol use    MEDICATIONS  (STANDING):  aspirin enteric coated 81 milliGRAM(s) Oral daily  atorvastatin 40 milliGRAM(s) Oral at bedtime  enoxaparin Injectable 40 milliGRAM(s) SubCutaneous every 24 hours  lactobacillus acidophilus 1 Tablet(s) Oral two times a day with meals  metoprolol succinate ER 25 milliGRAM(s) Oral daily  pantoprazole    Tablet 40 milliGRAM(s) Oral before breakfast  sodium chloride 0.9%. 1000 milliLiter(s) (100 mL/Hr) IV Continuous <Continuous>       Allergies  penicillins (Other)  penicillin (Anaphylaxis)        ROS: Pertinent positives in HPI, all other ROS were reviewed and are negative.      Vital Signs Last 24 Hrs  T(C): 36.9 (31 Oct 2023 08:32), Max: 37.1 (31 Oct 2023 03:25)  T(F): 98.5 (31 Oct 2023 08:32), Max: 98.7 (31 Oct 2023 03:25)  HR: 76 (31 Oct 2023 08:32) (68 - 96)  BP: 153/50 (31 Oct 2023 08:32) (116/50 - 153/50)  BP(mean): 75 (31 Oct 2023 08:32) (70 - 116)  RR: 18 (31 Oct 2023 08:32) (14 - 22)  SpO2: 98% (31 Oct 2023 08:32) (92% - 100%      Constitutional: awake and alert.  HEENT: PERRLA, EOMI,   Neck: Supple.  Respiratory: Breath sounds are clear bilaterally  Cardiovascular: S1 and S2, regular  Extremities:  no edema  Musculoskeletal: no joint swelling/tenderness, no abnormal movements  Skin: No rashes    Neurological exam:  HF: A x O x 3. Appropriately interactive, normal affect. Speech fluent, No Aphasia or paraphasic errors. Naming /repetition intact   CN: DEBRA, EOMI, VFF, facial sensation normal, no NLFD, tongue midline, Palate moves equally, SCM equal bilaterally  Motor: No pronator drift, Strength 5/5 in all 4 ext, normal bulk and tone, no tremors  Sens: Intact to light touch  Reflexes: Symmetric and normal,  downgoing toes b/l  Coord:  No FNFA, dysmetria, COBY intact   Gait/Balance: Normal/Cannot test    NIHSS: 0      Labs:   10-31    140  |  109<H>  |  10  ----------------------------<  105<H>  4.2   |  27  |  0.71    Ca    8.5      31 Oct 2023 04:00  Mg     2.1     10-31    TPro  8.1  /  Alb  3.9  /  TBili  0.4  /  DBili  x   /  AST  20  /  ALT  19  /  AlkPhos  87  10-30    10-31 Chol 182 LDL -- HDL 65 Trig 105                          12.1   7.42  )-----------( 196      ( 31 Oct 2023 04:00 )             37.2       Radiology:  CT Angio Neck Stroke Protocol w/ IV Cont (10.30.23 @ 20:03)     IMPRESSION:    HEAD CT: Mild volume loss, microvascular disease, no acute hemorrhage or   midline shift.    Discussed with Dr. Vinson in the ED at 8:07 PM.    CT PERFUSION demonstrated: No core infarct. No active ischemia.  If symptoms persist consider follow up head CT or MRI, MRA  if no   contraindication.    CTA COW:  Patent intracranial circulation without flow limiting stenosis.    CTA NECK: Calcified plaque with 50-69% stenosis at the ICA origins by   NASCET criteria. The plaque on the left side is irregular and could be a   source of emboli.  Bilateral vertebral arteries are patent without flow limiting stenosis.    Discussed with Dr. Vinson in the ED at 8:18 PM. MRI may be helpful for   further evaluation, ifclinically indicated.         CC: TIA      HPI:    73-year-old female with PMHx of head and neck cancer (sq cell ca), endometrial cancer s/p hysterectomy, SVT, CAD, HTN, HLD presented with vomiting and diarrhea. Patient states that she had 6 episodes of nonbloody, watery stools and 6 episodes of vomiting yesterday, became sweaty and lightheaded/dizzy at 6 PM lost consciousness and fell forward on a carpeted floor, unure if she hit her head, daughter found her lying on the floor. After she came around had left fingers paresthesias, difficulty expressing herself and L frontal/occipital/neck discomfort, it lasting a half hour, left fingers paresthesias is still slightly there. She denies focal weakness, shaking, incontinence, tongue biting, slurred speech, facial droop. In ED NIHSS - 1 for not being alert, Telestroke was consulted, was not a candidate for thrombolytics. CT head/CTA/CTP was neg for core infarct, LVO or hemorrhage, 50-69% stenosis at ICA origins, Left ICA was irregular.        PAST MEDICAL & SURGICAL HISTORY:  SCC (Squamous Cell Carcinoma) (ICD9 173.9)  Head and neck 2009 treated with Chem/Radiation/ LN removal  Chronic Gastritis (ICD9 535.10)  Uterine Cancer (ICD9 179)  no chemo no radiation  HTN - Hypertension  Dyslipidemia (ICD9 272.4)  H/O: Osteoarthritis (ICD9 V13.4)  Chest Pain (ICD9 786.50)  CAD (coronary artery disease)  History of PSVT (paroxysmal supraventricular tachycardia)  Incisional hernia  Kidney stone  History of Cholecystectomy (ICD9 V45.79)  2007  History of Tonsillectomy (ICD9 V45.89)  H/O: Hysterectomy (ICD9 V88.01)  at age 32  Lymph Node Cancer (ICD9 196.9)  removal of cervical LN due to head & neck squamous cell carcinoma ; 2009  S/P cardiac cath  2009      FAMILY HISTORY:  FH: lung cancer (Mother)      Social Hx:  Nonsmoker, no drug or alcohol use, lives with daughter      MEDICATIONS  (STANDING):  aspirin enteric coated 81 milliGRAM(s) Oral daily  atorvastatin 40 milliGRAM(s) Oral at bedtime  enoxaparin Injectable 40 milliGRAM(s) SubCutaneous every 24 hours  lactobacillus acidophilus 1 Tablet(s) Oral two times a day with meals  metoprolol succinate ER 25 milliGRAM(s) Oral daily  pantoprazole    Tablet 40 milliGRAM(s) Oral before breakfast  sodium chloride 0.9%. 1000 milliLiter(s) (100 mL/Hr) IV Continuous <Continuous>       Allergies  penicillins (Other)  penicillin (Anaphylaxis)        ROS: Pertinent positives in HPI, all other ROS were reviewed and are negative.        Vital Signs Last 24 Hrs  T(C): 36.9 (31 Oct 2023 08:32), Max: 37.1 (31 Oct 2023 03:25)  T(F): 98.5 (31 Oct 2023 08:32), Max: 98.7 (31 Oct 2023 03:25)  HR: 76 (31 Oct 2023 08:32) (68 - 96)  BP: 153/50 (31 Oct 2023 08:32) (116/50 - 153/50)  BP(mean): 75 (31 Oct 2023 08:32) (70 - 116)  RR: 18 (31 Oct 2023 08:32) (14 - 22)  SpO2: 98% (31 Oct 2023 08:32) (92% - 100%      Constitutional: awake and alert.  HEENT: PERRLA, EOMI,   Neck: Supple.  Respiratory: Breath sounds are clear bilaterally  Cardiovascular: S1 and S2, regular  Extremities:  no edema  Musculoskeletal: no joint swelling/tenderness, no abnormal movements  Skin: No rashes    Neurological exam:  HF: A x O x 3. Appropriately interactive, normal affect. Speech fluent, No Aphasia or paraphasic errors. Naming /repetition intact   CN: DEBRA, EOMI, VFF, facial sensation normal, no NLFD, tongue midline, Palate moves equally, SCM equal bilaterally  Motor: No pronator drift, Strength 5/5 in all 4 ext, normal bulk and tone, no tremors  Sens: Intact to light touch  Reflexes: Symmetric and normal,  downgoing toes b/l  Coord:  No FNFA, dysmetria, COBY intact   Gait/Balance: not tested    NIHSS: 0      Labs:   10-31    140  |  109<H>  |  10  ----------------------------<  105<H>  4.2   |  27  |  0.71    Ca    8.5      31 Oct 2023 04:00  Mg     2.1     10-31    TPro  8.1  /  Alb  3.9  /  TBili  0.4  /  DBili  x   /  AST  20  /  ALT  19  /  AlkPhos  87  10-30    10-31 Chol 182 LDL -- HDL 65 Trig 105                          12.1   7.42  )-----------( 196      ( 31 Oct 2023 04:00 )             37.2       Radiology:  CT Angio Neck Stroke Protocol w/ IV Cont (10.30.23 @ 20:03)     IMPRESSION:    HEAD CT: Mild volume loss, microvascular disease, no acute hemorrhage or   midline shift.    Discussed with Dr. Vinson in the ED at 8:07 PM.    CT PERFUSION demonstrated: No core infarct. No active ischemia.  If symptoms persist consider follow up head CT or MRI, MRA  if no   contraindication.    CTA COW:  Patent intracranial circulation without flow limiting stenosis.    CTA NECK: Calcified plaque with 50-69% stenosis at the ICA origins by   NASCET criteria. The plaque on the left side is irregular and could be a   source of emboli. Bilateral vertebral arteries are patent without flow limiting stenosis.    Discussed with Dr. Vinson in the ED at 8:18 PM. MRI may be helpful for   further evaluation, ifclinically indicated.

## 2023-10-31 NOTE — H&P ADULT - ASSESSMENT
73-year-old female presented with vomiting and diarrhea     1. Syncope likely secondary to orthostatic hypotension/dehydration versus vasovagal from pain versus arrhythmia or structural heart disease   - Admit to telemetry   - Orthostatic vital signs  - IVF with NS at 100 ml/hr   - ECHO ordered   - If ECHO abnormal will consult cardiology     2. Left hand numbness, expressive aphasia secondary to TIA vs. CVA?   - Patient was evaluated by telestroke who stated TNK was not indicated given no disabling effect  - CT head: no acute intracranial pathology  - Ordered MRI brain, MRA head/neck, lipid panel, HbA1c, ECHO   - Neuro checks Q4H  - Passed dysphagia screen in the ER -> DASH diet   - Blood glucose checks Q6H    - Start aspirin 81 mg daily and c/w atorvastatin 40 mg daily  - PT evaluation  - Allow for permissive HTN (BP up to 220/120) for 24 hours -> please restart/order BP medications in AM   - Neurology consult - Dr. Parr    3. Nausea vomiting secondary to gastroenteritis versus colitis?  - Ordered CT abd/pelvis   - f/u GI PCR and COVID     4. ICA stenosis  - Patient CTA neck: Calcified plaque with 50-69% stenosis at the ICA origins by NASCET criteria. The plaque on the left side is irregular and could be a source of emboli  - c/w ASA and statin   - Vascular consult - Dr. Barrientos     5. Hypokalemia   - Potassium 3.1, status post repletion in the ER  - f/u AM K+     6. Hyperglycemia  - Glucose 139, monitor  - Ordered HbA1c    7. History of SVT, nonobstructive CAD, hypertension  - c/w home medications; pharmacist verified with pt's daughter at the bedside -> please verify again in the AM     DVT ppx: Lovenox 40 mg subcutaneous daily   Code status: Full code   Emergency contact: Jess Rodrigez (daughter) [phone]     I spent a total of 80 minutes on the date of this encounter coordinating the patient's care. This includes reviewing prior documentation, results and imaging in addition to completing a full history and physical examination on the patient. Further tests, medications, and procedures have been ordered as indicated. Laboratory results and the plan of care were communicated to the patient and/or their family member. Supporting documentation was completed and added to the patient's chart.  73-year-old female presented with vomiting and diarrhea     1. Syncope likely secondary to orthostatic hypotension/dehydration versus vasovagal from pain versus arrhythmia or structural heart disease   - Admit to telemetry   - Orthostatic vital signs  - IVF with NS at 100 ml/hr   - ECHO ordered   - If ECHO abnormal will consult cardiology     2. Left hand numbness, expressive aphasia secondary to TIA vs. CVA?   - Patient was evaluated by telestroke who stated TNK was not indicated given no disabling effect  - CT head: no acute intracranial pathology  - Ordered MRI brain, MRA head/neck, lipid panel, HbA1c, ECHO   - Neuro checks Q4H  - Passed dysphagia screen in the ER -> DASH diet   - Blood glucose checks Q6H    - Start aspirin 81 mg daily and c/w atorvastatin 40 mg daily  - PT evaluation  - Allow for permissive HTN (BP up to 220/120) for 24 hours -> please restart/order BP medications in AM   - Neurology consult - Dr. Parr    3. Vomiting and diarrhea secondary to gastroenteritis versus colitis?  - Ordered CT abd/pelvis   - f/u GI PCR and COVID   - Supportive care, acidophilus    4. ICA stenosis  - Patient CTA neck: Calcified plaque with 50-69% stenosis at the ICA origins by NASCET criteria. The plaque on the left side is irregular and could be a source of emboli  - c/w ASA and statin   - Vascular consult - Dr. Barrientos     5. Hypokalemia   - Potassium 3.1, status post repletion in the ER  - f/u AM K+     6. Hyperglycemia  - Glucose 139, monitor  - Ordered HbA1c    7. History of head and neck cancer (squamous cell carcinoma) s/p lymph node excision, endometrial cancer s/p hysterectomy, s/p cholecystectomy, s/p hernia repair, SVT, nonobstructive CAD, HTN, HLD   - c/w home medications; pharmacist verified with pt's daughter at the bedside -> please verify again in the AM     DVT ppx: Lovenox 40 mg subcutaneous daily   Code status: Full code   Emergency contact: Jess Rodrigez (daughter) [phone]     I spent a total of 80 minutes on the date of this encounter coordinating the patient's care. This includes reviewing prior documentation, results and imaging in addition to completing a full history and physical examination on the patient. Further tests, medications, and procedures have been ordered as indicated. Laboratory results and the plan of care were communicated to the patient and/or their family member. Supporting documentation was completed and added to the patient's chart.  73-year-old female presented with vomiting and diarrhea     1. Syncope likely secondary to orthostatic hypotension/dehydration versus vasovagal from pain versus arrhythmia or structural heart disease   - Admit to telemetry   - Orthostatic vital signs  - IVF with NS at 100 ml/hr   - ECHO ordered   - If ECHO abnormal will consult cardiology     2. Left hand numbness, expressive aphasia secondary to TIA vs. CVA?   - Patient was evaluated by telestroke who stated TNK was not indicated given no disabling effect  - CT head: no acute intracranial pathology  - Ordered MRI brain, lipid panel, HbA1c, ECHO   - Neuro checks Q4H  - Passed dysphagia screen in the ER -> DASH diet   - Blood glucose checks Q6H    - Start aspirin 81 mg daily and c/w atorvastatin 40 mg daily  - PT evaluation  - Allow for permissive HTN (BP up to 220/120) for 24 hours -> please restart/order BP medications in AM   - Neurology consult - Dr. Parr    3. Vomiting and diarrhea secondary to gastroenteritis versus colitis?  - Ordered CT abd/pelvis   - f/u GI PCR and COVID   - Supportive care, acidophilus    4. ICA stenosis  - Patient CTA neck: Calcified plaque with 50-69% stenosis at the ICA origins by NASCET criteria. The plaque on the left side is irregular and could be a source of emboli  - c/w ASA and statin   - Vascular consult - Dr. Barrientos     5. Hypokalemia   - Potassium 3.1, status post repletion in the ER  - f/u AM K+     6. Hyperglycemia  - Glucose 139, monitor  - Ordered HbA1c    7. History of head and neck cancer (squamous cell carcinoma) s/p lymph node excision, endometrial cancer s/p hysterectomy, s/p cholecystectomy, s/p hernia repair, SVT, nonobstructive CAD, HTN, HLD   - c/w home medications; pharmacist verified with pt's daughter at the bedside -> please verify again in the AM     DVT ppx: Lovenox 40 mg subcutaneous daily   Code status: Full code   Emergency contact: Jess Rodrigez (daughter) [phone]     I spent a total of 80 minutes on the date of this encounter coordinating the patient's care. This includes reviewing prior documentation, results and imaging in addition to completing a full history and physical examination on the patient. Further tests, medications, and procedures have been ordered as indicated. Laboratory results and the plan of care were communicated to the patient and/or their family member. Supporting documentation was completed and added to the patient's chart.

## 2023-10-31 NOTE — PHYSICAL THERAPY INITIAL EVALUATION ADULT - PATIENT PROFILE REVIEW, REHAB EVAL
Chart reviewed and pertinent PMH noted. Please refer to plan of care and A&I for ongoing treatment notes./yes

## 2023-10-31 NOTE — CONSULT NOTE ADULT - SUBJECTIVE AND OBJECTIVE BOX
73-year-old female with PMH head and neck cancer (squamous cell carcinoma) s/p lymph node excision and radiation 17 y ago, endometrial cancer s/p hysterectomy, s/p cholecystectomy, s/p hernia repair, SVT, nonobstructive CAD, HTN, HLD presented with vomiting and diarrhea. Patient states that she had 6 episodes of nonbloody, watery stools and 6 episodes of vomiting yesterday. Patient states that she became sweaty and lightheaded/dizzy at 6 PM lost consciousness and fell forward on a carpeted floor.  Unsure if she hit her head.  Her daughter found her lying on the floor.  After she woke up she had some left fingers paresthesias, difficulty expressing herself lasting a half hour and L frontal/occipital/neck discomfort.    Pt states symptoms have resolved.   Pt is active at home and performs ADLs by her own, pt states she has chronic weakness in the right shoulder when she elevated above the shoulder level that has been present since neck surgery.   Denies focal weakness/numbness, shaking, incontinence, tongue biting, slurred speech, facial droop.   In ED NIHSS was 1 for not being alert, and she was not given thrombolytics.  CT head/CTA/CTP was neg for infarct/ischemia/hemorrhage.  There was 50-69% stenosis at left ICA origins.    Vitals:  T(C): 36.9 (10-31 @ 08:32), Max: 37.1 (10-31 @ 03:25)  HR: 76 (10-31 @ 08:32) (68 - 96)  BP: 153/50 (10-31 @ 08:32) (116/50 - 153/50)  RR: 18 (10-31 @ 08:32) (14 - 22)  SpO2: 98% (10-31 @ 08:32) (92% - 100%)      Physical Exam:    General: AAOx3, Well developed, NAD  Chest: Normal respiratory effort  Heart: RRR  Abdomen: Soft, NTND, no masses  Neuro/Psych: No localized deficits. Normal speech, normal tone  Skin: Normal, no rashes, no lesions noted.   Extremities: Warm, well perfused, weakness on the RUQ when elevated the arm above the shoulder.     10-31 @ 04:00                    12.1  CBC: 7.42>)-------(<196                     37.2                 140 | 109 | 10    CMP:  ----------------------< 105               4.2 | 27 | 0.71                      Ca:8.5  Phos:-  M.1               -|      |-        LFTs:  ------|-|-----             -|      |-  10-30 @ 20:15                    11.9  CBC: 8.29>)-------(<206                     36.0                 136 | 102 | 14    CMP:  ----------------------< 139               3.1 | 24 | 0.84                      Ca:9.3  Phos:-  Mg:-               0.4|      |20        LFTs:  ------|87|-----             -|      |-      Current Inpatient Medications:  acetaminophen     Tablet .. 650 milliGRAM(s) Oral every 6 hours PRN  aluminum hydroxide/magnesium hydroxide/simethicone Suspension 30 milliLiter(s) Oral every 4 hours PRN  aspirin enteric coated 81 milliGRAM(s) Oral daily  atorvastatin 40 milliGRAM(s) Oral at bedtime  enoxaparin Injectable 40 milliGRAM(s) SubCutaneous every 24 hours  lactobacillus acidophilus 1 Tablet(s) Oral two times a day with meals  melatonin 3 milliGRAM(s) Oral at bedtime PRN  metoprolol succinate ER 25 milliGRAM(s) Oral daily  ondansetron Injectable 4 milliGRAM(s) IV Push every 8 hours PRN  pantoprazole    Tablet 40 milliGRAM(s) Oral before breakfast  sodium chloride 0.9%. 1000 milliLiter(s) (100 mL/Hr) IV Continuous <Continuous>      < from: CT Angio Neck Stroke Protocol w/ IV Cont (10.30.23 @ 20:03) >  HEAD CT: Mild volume loss, microvascular disease, no acute hemorrhage or   midline shift.      CT PERFUSION demonstrated: No core infarct. No active ischemia.  If symptoms persist consider follow up head CT or MRI, MRA  if no   contraindication.    CTA COW:  Patent intracranial circulation without flow limiting stenosis.    CTA NECK: Calcified plaque with 50-69% stenosis at the ICA origins by   NASCET criteria. The plaque on the left side is irregular and could be a   source of emboli.  Bilateral vertebral arteries are patent without flow limiting stenosis.    < end of copied text >

## 2023-10-31 NOTE — H&P ADULT - NSHPREVIEWOFSYSTEMS_GEN_ALL_CORE
Constitutional: negative for fatigue, negative for fever, positive for chills, positive for decreased appetite.  Skin: negative for rashes, negative for open wounds, negative for jaundice.   Eyes: negative for blurry vision, negative for double vision.   Ears, nose, throat: negative for ear pain, negative for nasal congestion, negative for sore throat, negative for lymph node swelling.   Cardiovascular: negative for chest pain, negative for palpitations, negative for lower extremity swelling.   Respiratory: negative for shortness of breath, negative for wheezing, negative for cough.   Gastrointestinal: positive for abdominal pain, negative for nausea, positive for vomiting, positive for diarrhea, negative for constipation, negative for blood in the stool, negative for black tarry stools.   Genitourinary: negative for burning on urination, negative for urinary urgency or frequency, negative for blood in the urine.   Endocrine: negative for cold intolerance, negative for heat intolerance, negative for increased thirst.   Hematologic: negative for easy bruising or bleeding.   Musculoskeletal: negative for muscle/joint pain, negative for decreased range of motion.   Neurological: positive for dizziness, negative for headaches, positive for loss of consciousness, negative for motor weakness, positive for sensory deficits.   Psychiatric: negative for depression, negative for anxiety.

## 2023-10-31 NOTE — PHYSICAL THERAPY INITIAL EVALUATION ADULT - PERTINENT HX OF CURRENT PROBLEM, REHAB EVAL
Pt is a 73 year old female presenting with vomiting and diarrhea, also s/p fall +head strike. Pt endorses some left fingers paresthesias and difficulty expressing herself after fall. CT head unremarkable. Per neurology- possible vaso-vasal vs cardiogenic vs metabolic deraignment. Less likely TIA/stroke. PMH listed below.

## 2023-10-31 NOTE — CONSULT NOTE ADULT - ASSESSMENT
73-year-old female with past medical history of head and neck cancer (squamous cell carcinoma) s/p lymph node excision, endometrial cancer s/p hysterectomy, s/p cholecystectomy, s/p hernia repair, SVT, nonobstructive CAD, HTN, HLD, 2 episodes of syncope since 2020 presented with vomiting and diarrhea. Patient states that she had 6 episodes of nonbloody, watery stools and 6 episodes of vomiting yesterday.    Patient states that she became sweaty and lightheaded/dizzy at 6 PM lost consciousness and fell forward on a carpeted floor.  Unsure if she hit her head.  Her daughter found her lying on the floor.  After she woke up she had some left fingers paresthesias, difficulty expressing herself lasting a half hour and L frontal/occipital/neck discomfort.  L fingers paresthesias is still slightly there.     Denies focal weakness/numbness, shaking, incontinence, tongue biting, slurred speech, facial droop.  In ED NIHSS was 1 for not being alert, and she was not given thrombolytics. CT head/CTA/CTP was neg for infarct/ischemia/hemorrhage.  There was 50-69% stenosis at ICA origins.      #eoisode of syncope-possible vasovasal vs cardiogenic vs metabolic derrangment.  Less likely TIA/stroke.  C/O L fingers paresthesias and neck pain after fall  NIHSS 0  CT head/CTA/CTP neg for ischemia/infarct/bleed.  B/L 50-69%stenosis ICA origins    Recommendations:  -MRI brain/c-spine without contrast  -continue ASA, Statin  -vascular cs f/u Dr. Barrientos for b/ ICA stenosis  -PT evaluation  -Check orthostatics  -F/U TTE  -Will follow    D/W Dr. Parr, Dr. Liriano 73-year-old female with PMHx of head and neck cancer (sq cell ca), endometrial cancer s/p hysterectomy, SVT, CAD, HTN, HLD presented with vomiting and diarrhea. Patient states that she had 6 episodes of nonbloody, watery stools and 6 episodes of vomiting yesterday, became sweaty and lightheaded/dizzy at 6 PM lost consciousness and fell forward on a carpeted floor, unure if she hit her head, daughter found her lying on the floor. After she came around had left fingers paresthesias, difficulty expressing herself and L frontal/occipital/neck discomfort, it lasting a half hour, left fingers paresthesias is still slightly there. She denies focal weakness, shaking, incontinence, tongue biting, slurred speech, facial droop. In ED NIHSS - 1 for not being alert, Telestroke was consulted, was not a candidate for thrombolytics. CT head/CTA/CTP was neg for core infarct, LVO or hemorrhage, 50-69% stenosis at ICA origins, Left ICA was irregular with plaque.      # Syncope - possible vasovasal vs cardiogenic    # ? TIA/stroke; was not a candidate for thrombolytic therapy    # L hand  paresthesias and neck pain; possibility of cervical radiculopathy    # B/L 50-69% carotid stenois, left ICA palque + stenosis ICA origins    Recommendations:  -MRI brain/c-spine without contrast  -continue ASA, Statin  -vascular cs f/u Dr. Barrientos for b/ ICA stenosis  -PT evaluation  -Check orthostatics  -F/U TTE  -Will follow    D/W Dr. Parr, Dr. Liriano

## 2023-10-31 NOTE — CONSULT NOTE ADULT - ATTENDING COMMENTS
Carotid stenosis found on CTA, as CTA tends to overestimate degree of stenosis will obtain carotid duplex

## 2023-10-31 NOTE — CONSULT NOTE ADULT - NS ATTEND AMEND GEN_ALL_CORE FT
Patient seen and examined, agree with above assessment and plan.    We will follow-up with the test results.    Patient can follow-up as outpatient with neurology upon discharge

## 2023-11-01 LAB
CULTURE RESULTS: SIGNIFICANT CHANGE UP
CULTURE RESULTS: SIGNIFICANT CHANGE UP
SPECIMEN SOURCE: SIGNIFICANT CHANGE UP
SPECIMEN SOURCE: SIGNIFICANT CHANGE UP

## 2023-11-01 PROCEDURE — 99232 SBSQ HOSP IP/OBS MODERATE 35: CPT

## 2023-11-01 PROCEDURE — 99233 SBSQ HOSP IP/OBS HIGH 50: CPT

## 2023-11-01 RX ADMIN — PANTOPRAZOLE SODIUM 40 MILLIGRAM(S): 20 TABLET, DELAYED RELEASE ORAL at 06:25

## 2023-11-01 RX ADMIN — Medication 25 MILLIGRAM(S): at 10:54

## 2023-11-01 RX ADMIN — ATORVASTATIN CALCIUM 40 MILLIGRAM(S): 80 TABLET, FILM COATED ORAL at 21:31

## 2023-11-01 RX ADMIN — Medication 1 TABLET(S): at 17:42

## 2023-11-01 RX ADMIN — Medication 650 MILLIGRAM(S): at 21:31

## 2023-11-01 RX ADMIN — Medication 81 MILLIGRAM(S): at 10:54

## 2023-11-01 RX ADMIN — ENOXAPARIN SODIUM 40 MILLIGRAM(S): 100 INJECTION SUBCUTANEOUS at 06:25

## 2023-11-01 RX ADMIN — Medication 1 TABLET(S): at 10:54

## 2023-11-01 NOTE — DIETITIAN INITIAL EVALUATION ADULT - ADD RECOMMEND
Liberalize diet to Regular to optimize po/nutrient intake.   Record PO intake in EMR after each meal (nursing.)   MVI w/ minerals daily to ensure 100% RDA met   Consider adding thiamine 100 mg daily 2/2 poor PO intake/ malnutrition   Monitor bowel movements, if no BM for >3 days, consider implementing bowel regimen.  Blood glucose target of 140 to 180 mg/dL  Confirm Goals of Care regarding nutrition support   Monitor PO intake, tolerance, labs and weight.

## 2023-11-01 NOTE — DIETITIAN INITIAL EVALUATION ADULT - OTHER INFO
73-year-old female with past medical history of head and neck cancer (squamous cell carcinoma) s/p lymph node excision, endometrial cancer s/p hysterectomy, s/p cholecystectomy, s/p hernia repair, SVT, nonobstructive CAD, HTN, HLD presents with vomiting and diarrhea. Patient states that she had 6 episodes of nonbloody, watery stools and 6 episodes of vomiting today.  She did not urinate today.  Reports diffuse abdominal pain which is described as a soreness after having diarrhea and vomiting.  Patient states that she became dizzy at 6 PM and she lost consciousness.  Unsure if she hit her head.  Her daughter found her lying on the floor.  After she woke up she had some left hand numbness and difficulty expressing herself.  She states that her left hand is no longer numb.  Denies fevers, chest pain, shortness of breath, urinary symptoms, lower extremity swelling.    Admit dx : syncope and collapse  Visited pt while she was in chair.  Pt reports fair PO intake at , she does not have much of an appetite  Pt does not give weight hx, she does not know if she's lost weight  Vomiting, watery stools resolved  Pt passed dysphagia screen  HgbA1c is 6.1, pre-diabetes  Healthy diabetes diet discussed  Pt on POCT  Suggest add Glucerna bid  Recommendations to follow in Plan/Intervention

## 2023-11-01 NOTE — DIETITIAN INITIAL EVALUATION ADULT - ETIOLOGY
r/t inability to consume sufficient energy/protein 2/2 possible c.diff/norovirus,  hx of cancer head and neck, endometrial

## 2023-11-01 NOTE — DIETITIAN INITIAL EVALUATION ADULT - NS FNS DIET ORDER
Home Medications:  amLODIPine 5 mg oral tablet: 1 tab(s) orally once a day (30 Oct 2023 20:27)  Crestor 10 mg oral tablet: 1 tab(s) orally once a day (31 Oct 2023 01:09)  lisinopril 10 mg oral tablet: 1 tab(s) orally once a day (30 Oct 2023 20:27)  metoprolol succinate 25 mg oral tablet, extended release: 1 tab(s) orally once a day (30 Oct 2023 20:27)  PriLOSEC OTC 20 mg oral delayed release tablet: 1 tab(s) orally once a day (31 Oct 2023 01:09)  Tylenol Extra Strength 500 mg oral tablet: 2 tab(s) orally 2 times a day as needed for pain (31 Oct 2023 01:09)

## 2023-11-01 NOTE — PROGRESS NOTE ADULT - NUTRITIONAL ASSESSMENT
This patient has been assessed with a concern for Malnutrition and has been determined to have a diagnosis/diagnoses of Severe protein-calorie malnutrition.    This patient is being managed with:   Diet DASH/TLC-  Sodium & Cholesterol Restricted  Entered: Oct 31 2023  3:11AM

## 2023-11-01 NOTE — PROGRESS NOTE ADULT - ATTENDING COMMENTS
carotid duplex results noted, less than 50% plaque bilaterally -- no surgical intervention warranted   will follow yearly as outpatient for surveillance duplexes

## 2023-11-01 NOTE — PROGRESS NOTE ADULT - ASSESSMENT
73-year-old female with PMHx of head and neck cancer (sq cell ca), endometrial cancer s/p hysterectomy, SVT, CAD, HTN, HLD presented with vomiting and diarrhea. Patient states that she had 6 episodes of nonbloody, watery stools and 6 episodes of vomiting yesterday, became sweaty and lightheaded/dizzy at 6 PM lost consciousness and fell forward on a carpeted floor, unure if she hit her head, daughter found her lying on the floor. After she came around had left fingers paresthesias, difficulty expressing herself and L frontal/occipital/neck discomfort, it lasting a half hour, left fingers paresthesias is still slightly there. She denies focal weakness, shaking, incontinence, tongue biting, slurred speech, facial droop. In ED NIHSS - 1 for not being alert, Telestroke was consulted, was not a candidate for thrombolytics. CT head/CTA/CTP was neg for core infarct, LVO or hemorrhage, 50-69% stenosis at ICA origins, Left ICA was irregular with plaque.      # Syncope - possible vasovasal vs cardiogenic    # Less likely TIA    # Cervical spondylosis with cord flattering at C4/5-C5/6, Left C5/6 radiculopathy    # B/L 50-69% carotid stenois, left ICA palque + stenosis ICA origins    Recommendations:  --continue ASA, Statin  - opinion with ortho-spine/ NS reg cord flattening  - PT evaluation    Above D/W patient and Dr. Liriano    Call neuro if needed henceforth

## 2023-11-01 NOTE — PROGRESS NOTE ADULT - ASSESSMENT
74 yo female with multiple comorbidities s/p syncope, imaging negative for stroke, CTA neck positive for L ICA 50-69% stenosis, however duplex with less than 50% stenoses bilaterally. Given presence of plaque irregularity, will f/u brain MRI to assess for embolic phenomena.    Recommendations:    Please c/w with best medical management ASA, statins, BP control  F/u read of brain MRI to assess for embolic phenomena  Vascular surgery will follow    Case discussed with Dr. Barrientos 74 yo female with multiple comorbidities s/p syncope, imaging negative for stroke, CTA neck positive for L ICA 50-69% stenosis, however duplex with less than 50% stenoses bilaterally. Given presence of plaque irregularity, will f/u brain MRI to assess for embolic phenomena.    Recommendations:    Please c/w with best medical management ASA, statins, BP control  Brain MRI without embolic phenomena  Follow neurology recommendations  Continue care per primary team  The patient does not require vascular surgery intervention at this time; please reconsult as needed.      Case discussed with Dr. Barrientos

## 2023-11-01 NOTE — DIETITIAN INITIAL EVALUATION ADULT - ORAL INTAKE PTA/DIET HISTORY
Pt lives with daughter, who shops with her.  Pt cooks.  Pt eats one meal a day, usually coffee and toast and bread.  She said she loves  vegetables, does not eat a lot of simple carbs, and eats fish but not much chicken or meat.  PO intake estimated < 75% ENN > one month

## 2023-11-01 NOTE — CHART NOTE - NSCHARTNOTEFT_GEN_A_CORE
Received a call from nurse that patient is positive for Norovirus.     plan:   Norovirus   - Placed patient on isolation precautions

## 2023-11-01 NOTE — DIETITIAN INITIAL EVALUATION ADULT - PERTINENT LABORATORY DATA
10-31    140  |  109<H>  |  10  ----------------------------<  105<H>  4.2   |  27  |  0.71    Ca    8.5      31 Oct 2023 04:00  Mg     2.1     10-31    TPro  8.1  /  Alb  3.9  /  TBili  0.4  /  DBili  x   /  AST  20  /  ALT  19  /  AlkPhos  87  10-30  POCT Blood Glucose.: 102 mg/dL (10-31-23 @ 22:09)  A1C with Estimated Average Glucose Result: 6.0 % (10-31-23 @ 04:00)

## 2023-11-01 NOTE — DIETITIAN NUTRITION RISK NOTIFICATION - ADDITIONAL COMMENTS/DIETITIAN RECOMMENDATIONS
Additional Recommendations  Liberalize diet to Regular to optimize po/nutrient intake.   Record PO intake in EMR after each meal (nursing.)   MVI w/ minerals daily to ensure 100% RDA met   Consider adding thiamine 100 mg daily 2/2 poor PO intake/ malnutrition   Monitor bowel movements, if no BM for >3 days, consider implementing bowel regimen.  Blood glucose target of 140 to 180 mg/dL  Confirm Goals of Care regarding nutrition support   Monitor PO intake, tolerance, labs and weight.

## 2023-11-01 NOTE — DIETITIAN INITIAL EVALUATION ADULT - NAME AND PHONE
Karma Colindres RDN, CDN, [facility]      [phone]   [email or web address]

## 2023-11-01 NOTE — CONSULT NOTE ADULT - SUBJECTIVE AND OBJECTIVE BOX
Neurosurgery:    HPI:  73-year-old female with past medical history of head and neck cancer (squamous cell carcinoma) s/p lymph node excision, endometrial cancer s/p hysterectomy, s/p cholecystectomy, s/p hernia repair, SVT, nonobstructive CAD, HTN, HLD presents with vomiting and diarrhea. Patient states that she had 6 episodes of nonbloody, watery stools and 6 episodes of vomiting today.  She did not urinate today.  Reports diffuse abdominal pain which is described as a soreness after having diarrhea and vomiting.  Patient states that she became dizzy at 6 PM and she lost consciousness.  Unsure if she hit her head.  Her daughter found her lying on the floor.  After she woke up she had some left hand numbness and difficulty expressing herself.  She states that her left hand is no longer numb.  Denies fevers, chest pain, shortness of breath, urinary symptoms, lower extremity swelling.    Focused Neurosx eval:  MRI of Brain and Cspine performed:  Pt with C4-5/5-6 disc and cord flattening.  Pt with some residual dorsum hand numbness into all fingers (pinky and ring numbness is > 1st, 2nd, 3rd digit), not perfectly dermatomal in pattern.  Pt with no neck pain.  Pt with long standing right deltoid weakness as pt had surgical resection over her brachial plexus with noted shoulder girdle atrophy from surgery 16 years ago.  No active neurosurgical intervention but outpt follow up is acceptable to monitor pt's symptoms and left hand numbness.   Pt with no myelopathy on exam    Imaging:  - HEAD CT: Mild volume loss, microvascular disease, no acute hemorrhage or midline shift.  - CT PERFUSION demonstrated: No core infarct. No active ischemia. If symptoms persist consider follow up head CT or MRI, MRA if no contraindication.  - CTA COW: Patent intracranial circulation without flow limiting stenosis.  - CTA NECK: Calcified plaque with 50-69% stenosis at the ICA origins by NASCET criteria. The plaque on the left side is irregular and could be a source of emboli.  Bilateral vertebral arteries are patent without flow limiting stenosis.    PAST MEDICAL & SURGICAL HISTORY:  SCC (Squamous Cell Carcinoma) (ICD9 173.9)  Head and neck 2009 treated with Chem/Radiation/ LN removal  Chronic Gastritis (ICD9 535.10)  Uterine Cancer (ICD9 179) no chemo no radiation  HTN - Hypertension  Dyslipidemia (ICD9 272.4)  H/O: Osteoarthritis (ICD9 V13.4)  Chest Pain (ICD9 786.50)  CAD (coronary artery disease)  History of PSVT (paroxysmal supraventricular tachycardia)  Incisional hernia  Kidney stone  History of Cholecystectomy (ICD9 V45.79) 2007  History of Tonsillectomy (ICD9 V45.89)  H/O: Hysterectomy (ICD9 V88.01)  at age 32    FAMILY HISTORY:  FH: lung cancer (Mother)     Social Hx:    No active Tob / Etoh    Allergies  penicillins (Other)  penicillin (Anaphylaxis)    MEDICATIONS  (STANDING):  aspirin enteric coated 81 milliGRAM(s) Oral daily  atorvastatin 40 milliGRAM(s) Oral at bedtime  enoxaparin Injectable 40 milliGRAM(s) SubCutaneous every 24 hours  influenza  Vaccine (HIGH DOSE) 0.7 milliLiter(s) IntraMuscular once  lactobacillus acidophilus 1 Tablet(s) Oral two times a day with meals  metoprolol succinate ER 25 milliGRAM(s) Oral daily  pantoprazole    Tablet 40 milliGRAM(s) Oral before breakfast  sodium chloride 0.9%. 1000 milliLiter(s) (100 mL/Hr) IV Continuous <Continuous>     ROS: Pertinent positives in HPI, all other ROS were reviewed and are negative.     Vital Signs Last 24 Hrs  T(C): 36.8 (01 Nov 2023 07:36), Max: 36.8 (01 Nov 2023 07:36)  T(F): 98.2 (01 Nov 2023 07:36), Max: 98.2 (01 Nov 2023 07:36)  HR: 73 (01 Nov 2023 07:36) (70 - 73)  BP: 154/60 (01 Nov 2023 07:36) (141/55 - 154/60)  BP(mean): --  RR: 18 (01 Nov 2023 07:36) (18 - 18)  SpO2: 94% (01 Nov 2023 07:36) (94% - 100%)    Parameters below as of 01 Nov 2023 07:36  Patient On (Oxygen Delivery Method): room air    Labs:                        12.1   7.42  )-----------( 196      ( 31 Oct 2023 04:00 )             37.2     10-31    140  |  109<H>  |  10  ----------------------------<  105<H>  4.2   |  27  |  0.71    Ca    8.5      31 Oct 2023 04:00  Mg     2.1     10-31    TPro  8.1  /  Alb  3.9  /  TBili  0.4  /  DBili  x   /  AST  20  /  ALT  19  /  AlkPhos  87  10-30    10-31 Chol 182 LDL -- HDL 65 Trig 105  PT/INR - ( 30 Oct 2023 20:15 )   PT: 11.3 sec;   INR: 1.00 ratio    PTT - ( 30 Oct 2023 20:15 )  PTT:30.4 sec    Radiology report:  COMPARISON: Soft tissue neck CT dated 2/18/2015  FINDINGS:  There is mild straightening of the normal cervical lordosis.    There is disc desiccation diffusely throughout the spine with disc space   narrowing at C4-5 and C5-6.    C2-3: No central canal or neural foraminal stenosis.  C3-4: Posterior osteophytic ridging. No neural foraminal stenosis.  C4-5: Anterior posterior osteophytes, disc bulge and bilateral   uncovertebral joint hypertrophy. Flattening of the spinal cord. Moderate   central canal, moderate right and mild left neural foraminal stenosis.  C5-6: Posterior osteophytic ridging and bilateral uncovertebral joint   hypertrophy. Mild flattening of the spinal cord. Mild right and moderate   left neural foraminal stenosis.  C6-7: Posterior osteophytic ridging and uncovertebral joint hypertrophy.   Mild central canal stenosis. No neural foraminalstenosis.  C7/T1: No central canal or neural foraminal stenosis.    The marrow signal is normal. Endplate degenerative changes are noted at   C4-5.  No intrinsic spinal cord abnormality is identified.    IMPRESSION:  Multilevel degenerative changes with flattening of the spinal cord at   C4-5 and C5-6.      Physical Exam:  Constitutional: Awake / alert, [place] speaking but understands and communicates in english  HEENT: PERRLA, EOMI  Neck: Supple  Respiratory: Breath sounds are clear bilaterally  Cardiovascular: S1 and S2, regular rhythm  Gastrointestinal: Soft, NT/ND  Extremities:  no edema, right shoulder girdle post musculature atrophy with shoulder rotation / drop since surgery 16 years ago.  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    Neurological Exam:  HF: A x O x 3, appropriately interactive, normal affect, speech fluent, no aphasia or paraphasic errors. Naming /repetition intact   CN: PERRL, EOMI, VFF, facial sensation normal, no NLFD, tongue midline  Motor: No pronator drift, Strength 5/5 in all 4 ext, normal bulk and tone, no tremor, rigidity or bradykinesia  Sens: Intact to light touch  Reflexes: Symmetric and normal, downgoing toes b/l  Coord:  No FNFA, dysmetria, COBY intact   Gait/Balance: Cannot test    A/P:    73-year-old female with past medical history of head and neck cancer (squamous cell carcinoma) s/p lymph node excision, endometrial cancer s/p hysterectomy, s/p cholecystectomy, s/p hernia repair, SVT, nonobstructive CAD, HTN, HLD presents with vomiting and diarrhea.    - No acute neurosurgical at this time  - Pt undergoing syncope work up.   - Pt is at risk for cord injury with mod stenosis of Cspine (C4-5,5-6) identified on MRI study if any further syncopal fall events were to occur.  - Pt with flattening of cervical spinal cord and mod / severe compression of C4-5,5-6 (pt would benefit from decompression in future)   - Pt shoudl recover from viral syndrome event first however.  - Pt may follow up with Dr. Kovacs upon discharge from hospital  - Left hand numbness not dermatomal completly and resolving  - Pt has not cord compression signs of hyper-reflexia or hoffmans or clonus.  - Will leave Card with pt of Dr. Copeland and Dr. Kovacs will see in AM  - DVT ppx recommended along with PT/OT  - with many thanks for this consultation.

## 2023-11-02 ENCOUNTER — TRANSCRIPTION ENCOUNTER (OUTPATIENT)
Age: 73
End: 2023-11-02

## 2023-11-02 VITALS
DIASTOLIC BLOOD PRESSURE: 69 MMHG | HEART RATE: 72 BPM | OXYGEN SATURATION: 98 % | RESPIRATION RATE: 19 BRPM | SYSTOLIC BLOOD PRESSURE: 170 MMHG

## 2023-11-02 PROCEDURE — 99232 SBSQ HOSP IP/OBS MODERATE 35: CPT

## 2023-11-02 PROCEDURE — 99239 HOSP IP/OBS DSCHRG MGMT >30: CPT

## 2023-11-02 RX ORDER — MULTIVIT-MIN/FERROUS GLUCONATE 9 MG/15 ML
1 LIQUID (ML) ORAL DAILY
Refills: 0 | Status: DISCONTINUED | OUTPATIENT
Start: 2023-11-02 | End: 2023-11-02

## 2023-11-02 RX ORDER — THIAMINE MONONITRATE (VIT B1) 100 MG
100 TABLET ORAL DAILY
Refills: 0 | Status: DISCONTINUED | OUTPATIENT
Start: 2023-11-02 | End: 2023-11-02

## 2023-11-02 RX ORDER — ROSUVASTATIN CALCIUM 5 MG/1
1 TABLET ORAL
Refills: 0 | DISCHARGE

## 2023-11-02 RX ORDER — ATORVASTATIN CALCIUM 80 MG/1
1 TABLET, FILM COATED ORAL
Qty: 30 | Refills: 0
Start: 2023-11-02 | End: 2023-12-01

## 2023-11-02 RX ORDER — ASPIRIN/CALCIUM CARB/MAGNESIUM 324 MG
1 TABLET ORAL
Qty: 30 | Refills: 0
Start: 2023-11-02 | End: 2023-12-01

## 2023-11-02 RX ADMIN — ENOXAPARIN SODIUM 40 MILLIGRAM(S): 100 INJECTION SUBCUTANEOUS at 05:39

## 2023-11-02 RX ADMIN — PANTOPRAZOLE SODIUM 40 MILLIGRAM(S): 20 TABLET, DELAYED RELEASE ORAL at 05:51

## 2023-11-02 RX ADMIN — Medication 81 MILLIGRAM(S): at 09:03

## 2023-11-02 RX ADMIN — Medication 1 TABLET(S): at 09:03

## 2023-11-02 RX ADMIN — Medication 25 MILLIGRAM(S): at 09:03

## 2023-11-02 NOTE — DISCHARGE NOTE PROVIDER - HOSPITAL COURSE
CC: vomiting and diarrhea  HPI and Hospital course: 73-year-old female with past medical history of head and neck cancer (squamous cell carcinoma) s/p lymph node excision, endometrial cancer s/p hysterectomy, s/p cholecystectomy, s/p hernia repair, SVT, nonobstructive CAD, HTN, HLD presents with vomiting and diarrhea. Patient states that she had 6 episodes of nonbloody, watery stools and 6 episodes of vomiting today.  She did not urinate today.  Reports diffuse abdominal pain which is described as a soreness after having diarrhea and vomiting.  Patient states that she became dizzy at 6 PM and she lost consciousness.  Unsure if she hit her head.  Her daughter found her lying on the floor.  After she woke up she had some left hand numbness and difficulty expressing herself.  She states that her left hand is no longer numb.  Denies fevers, chest pain, shortness of breath, urinary symptoms, lower extremity swelling.    #Syncope likely secondary to orthostatic hypotension/dehydration versus vasovagal from pain versus arrhythmia or structural heart disease   + Norovirus  -diarrhea improving  -orthostasis resuloved    #Left hand numbness, MRI showing Multilevel degenerative changes with flattening of the spinal cord at  C4-5 and C5-6.  -for outpt f/u with NSG (Dr Kovacs)  -appreciate neuro input    #ICA stenosis  - Patient CTA neck: Calcified plaque with 50-69% stenosis at the ICA origins by NASCET criteria. The plaque on the left side is irregular and could be a source of emboli  - c/w ASA and statin   - Vascular consult - Dr. Barrientos noted, for outpt f/u    #Abnormal TTE severe AR  -needs OP f/up; EF 55% no need for intervention    #history of head and neck cancer (squamous cell carcinoma) s/p lymph node excision, endometrial cancer s/p hysterectomy, s/p cholecystectomy, s/p hernia repair, SVT, nonobstructive CAD, HTN, HLD   - c/w home medications    Code status: Full code   Emergency contact: Jess Rodrigez (daughter) [phone]  For d/c home. I have spent 32 min on day of d/c coordinating care.

## 2023-11-02 NOTE — DISCHARGE NOTE NURSING/CASE MANAGEMENT/SOCIAL WORK - PATIENT PORTAL LINK FT
You can access the FollowMyHealth Patient Portal offered by [facility] by registering at the following website: [email or web address]. By joining [employer]’s FollowMyHealth portal, you will also be able to view your health information using other applications (apps) compatible with our system.

## 2023-11-02 NOTE — PROGRESS NOTE ADULT - REASON FOR ADMISSION
vomiting and diarrhea
vomiting and diarrhea
vomiting and diarrhea  Left hand numbness
vomiting and diarrhea

## 2023-11-02 NOTE — DISCHARGE NOTE PROVIDER - NSDCMRMEDTOKEN_GEN_ALL_CORE_FT
amLODIPine 5 mg oral tablet: 1 tab(s) orally once a day  aspirin 81 mg oral delayed release tablet: 1 tab(s) orally once a day  atorvastatin 40 mg oral tablet: 1 tab(s) orally once a day (at bedtime)  lisinopril 10 mg oral tablet: 1 tab(s) orally once a day  metoprolol succinate 25 mg oral tablet, extended release: 1 tab(s) orally once a day  PriLOSEC OTC 20 mg oral delayed release tablet: 1 tab(s) orally once a day  Tylenol Extra Strength 500 mg oral tablet: 2 tab(s) orally 2 times a day as needed for pain

## 2023-11-02 NOTE — DISCHARGE NOTE PROVIDER - DETAILS OF MALNUTRITION DIAGNOSIS/DIAGNOSES
This patient has been assessed with a concern for Malnutrition and was treated during this hospitalization for the following Nutrition diagnosis/diagnoses:     -  11/01/2023: Severe protein-calorie malnutrition

## 2023-11-02 NOTE — DISCHARGE NOTE NURSING/CASE MANAGEMENT/SOCIAL WORK - NSDCPEFALRISK_GEN_ALL_CORE
For information on Fall & Injury Prevention, visit: [email or web address] OR  [email or web address] OR  https://www.cdc.gov/steadi/patient.html

## 2023-11-02 NOTE — DISCHARGE NOTE PROVIDER - CARE PROVIDER_API CALL
David Edgar  Internal Medicine  [address] [place] [address]  Phone: [phone]  Fax: [phone]  Follow Up Time: 1 week    Bright Kovacs  Neurosurgery  [address] [place] [address]  Phone: [phone]  Fax: [phone]  Follow Up Time: 1 week    Jesi Barrientos  Vascular Surgery  [address] [facility]  [place] [address]  Phone: [phone]  Fax: [phone]  Follow Up Time: 2 weeks

## 2023-11-02 NOTE — PROGRESS NOTE ADULT - ASSESSMENT
73 year old female with Left hand numbness & tingling     73 year old female with Left hand numbness & tingling     No acute Neurosurgical intervention at this time, may follow up as out patient  Continue work up for syncope  Care and management per primary team  case discussed & imaging reviewed with Dr Kovacs

## 2023-11-02 NOTE — CDI QUERY NOTE - NSCDIOTHERTXTBX_GEN_ALL_CORE_HH
There is conflicting documentation regarding the patient's nutrition status. The patient received a nutrition assessment by a Registered Dietician on severe protein-calorie malnutrition. The documentation of this assessment states: 11/1/2023.  Chart documentation also states the patient is well-developed.    Chart Documentation:    Dietitian Initial Evaluation 11/1/2023   Body Mass Index: 19.5   ASSESSMENT:  Physical Assessment: signs muscle wasting, fat wasting  Nutrition Focused Physical Exam: yes...   Muscle Mass (Loss of Muscle): Temples...  Clavicles...  Shoulders...  Thigh...  Calf...   Temples Depletion is moderate   Clavicles Depletion is severe   Shoulders Depletion is severe   Thigh Depletion is moderate   Calf Depletion is moderate   Body Fat (loss of subcutaneous fat): Triceps...   Triceps Depletion is moderate   Pt meets criteria for severe protein-calorie malnutrition in context of chronic disease r/t inability to consume sufficient energy/protein 2/2 possible c.diff/norovirus, hx of cancer head and neck, endometrial muscle wasting, fat wasting, PO intake estimated < 75% ENN > one month    Adult-Hospitalist progress note 11/1/2023   GENERAL: well-developed  · Nutritional Assessment  This patient has been assessed with a concern for Malnutrition and has been determined to have a diagnosis/diagnoses of Severe protein-calorie malnutrition.    Can the nutrition diagnosis and criteria be clarified, after study?    - Patient was not well-developed and was found to have severe protein-calorie malnutrition  (please also refer to the Dietician Assessment for further details)  - Other (please specify)  - Not clinically significant

## 2023-11-02 NOTE — DISCHARGE NOTE PROVIDER - NSDCCAREPROVSEEN_GEN_ALL_CORE_FT
Sandie Gallego (hospital medicine)  Bright Kovacs (neurosurgery)  Lindsay Parr (neurology)  Jesi Barrientos (vascular surgery)

## 2023-11-02 NOTE — DISCHARGE NOTE PROVIDER - CARE PROVIDERS DIRECT ADDRESSES
,DirectAddress_Unknown,[email or web address],DirectAddress_Unknown

## 2023-11-02 NOTE — DISCHARGE NOTE PROVIDER - PROVIDER TOKENS
PROVIDER:[TOKEN:[[id number]],FOLLOWUP:[1 week]],PROVIDER:[TOKEN:[[id number]],FOLLOWUP:[1 week]],PROVIDER:[TOKEN:[[id number]],FOLLOWUP:[2 weeks]]

## 2023-11-02 NOTE — DISCHARGE NOTE PROVIDER - NSDCCPCAREPLAN_GEN_ALL_CORE_FT
PRINCIPAL DISCHARGE DIAGNOSIS  Diagnosis: Syncope  Assessment and Plan of Treatment: Likely in setting of dehydration/diarhea from norovirus.      SECONDARY DISCHARGE DIAGNOSES  Diagnosis: Cervical disc disease  Assessment and Plan of Treatment: Follow up with Dr. Kovacs

## 2023-11-02 NOTE — PROGRESS NOTE ADULT - SUBJECTIVE AND OBJECTIVE BOX
73 year old RH female with a PMH of head and neck cancer (squamous cell carcinoma) s/p lymph node excision, endometrial cancer s/p hysterectomy, s/p cholecystectomy, s/p hernia repair, SVT, nonobstructive CAD, HTN, HLD presents with vomiting and diarrhea on 10/31. Patient states that she had 6 episodes of nonbloody, watery stools and 6 episodes of vomiting today.  She did not urinate today.  Reports diffuse abdominal pain which is described as a soreness after having diarrhea and vomiting.  Patient states that she became dizzy at 6 PM and she lost consciousness.  Unsure if she hit her head.  Her daughter found her lying on the floor.  After she woke up she had some left hand numbness and difficulty expressing herself.  She states that her left hand is no longer numb.  Denies fevers, chest pain, shortness of breath, urinary symptoms, lower extremity swelling. Neurosurgery was consulted for cervical stenosis.    ICU Vital Signs Last 24 Hrs  T(C): 36.9 (02 Nov 2023 08:15), Max: 36.9 (01 Nov 2023 20:27)  T(F): 98.4 (02 Nov 2023 08:15), Max: 98.4 (01 Nov 2023 20:27)  HR: 60 (02 Nov 2023 08:15) (60 - 75)  BP: 152/55 (02 Nov 2023 08:15) (149/59 - 170/72)  BP(mean): 82 (02 Nov 2023 08:15) (82 - 82)  ABP: --  ABP(mean): --  RR: 19 (02 Nov 2023 08:15) (18 - 19)  SpO2: 99% (02 Nov 2023 08:15) (99% - 100%)    O2 Parameters below as of 02 Nov 2023 08:15  Patient On (Oxygen Delivery Method): room air    MEDICATIONS  (STANDING):  aspirin enteric coated 81 milliGRAM(s) Oral daily  atorvastatin 40 milliGRAM(s) Oral at bedtime  enoxaparin Injectable 40 milliGRAM(s) SubCutaneous every 24 hours  influenza  Vaccine (HIGH DOSE) 0.7 milliLiter(s) IntraMuscular once  lactobacillus acidophilus 1 Tablet(s) Oral two times a day with meals  metoprolol succinate ER 25 milliGRAM(s) Oral daily  pantoprazole    Tablet 40 milliGRAM(s) Oral before breakfast    MEDICATIONS  (PRN):  acetaminophen     Tablet .. 650 milliGRAM(s) Oral every 6 hours PRN Temp greater or equal to 38C (100.4F), Mild Pain (1 - 3)  aluminum hydroxide/magnesium hydroxide/simethicone Suspension 30 milliLiter(s) Oral every 4 hours PRN Dyspepsia  melatonin 3 milliGRAM(s) Oral at bedtime PRN Insomnia  ondansetron Injectable 4 milliGRAM(s) IV Push every 8 hours PRN Nausea and/or Vomiting        Physical Exam:  Constitutional: Awake & alert, Greek speaking but understands and communicates in english  HEENT: Pupils reactive, EOMI  Neck: Supple  Respiratory: Breath sounds are clear bilaterally  Cardiovascular: S1 and S2, regular rhythm  Gastrointestinal: Soft, NT/ND  Extremities:  no edema, right shoulder girdle post musculature atrophy with shoulder rotation / drop since surgery 16 years ago.  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    Neurological Exam:  HF: A & O x 3, appropriately interactive, normal affect, speech fluent  CN: PERRL, EOMI, VFF, facial sensation normal, no NLFD, tongue midline  Motor: No pronator drift, Strength 5/5 in all 4 ext, normal bulk and tone, no tremor, rigidity or bradykinesia  Sens: Intact to light touch  No Hoffmans, no clonus  Gait/Balance: Cannot test      PROCEDURE DATE:  10/31/2023          INTERPRETATION:  MRI of the cervical spine without contrast    CLINICAL INDICATION: Neck pain and left finger paresthesia    TECHNIQUE: Multiplanar, multisequence MR images of the cervical spine   were obtained without the administration of intravenous contrast.    COMPARISON: Soft tissue neck CT dated 2/18/2015    FINDINGS:    There is mild straightening of the normal cervical lordosis.    There is disc desiccation diffusely throughout the spine with disc space   narrowing at C4-5 and C5-6.    C2-3: No central canal or neural foraminal stenosis.    C3-4: Posterior osteophytic ridging. No neural foraminal stenosis.    C4-5: Anterior posterior osteophytes, disc bulge and bilateral   uncovertebral joint hypertrophy. Flattening of the spinal cord. Moderate   central canal, moderate right and mild left neural foraminal stenosis.    C5-6: Posterior osteophytic ridging and bilateral uncovertebral joint   hypertrophy. Mild flattening of the spinal cord. Mild right and moderate   left neural foraminal stenosis.    C6-7: Posterior osteophytic ridging and uncovertebral joint hypertrophy.   Mild central canal stenosis. No neural foraminalstenosis.    C7/T1: No central canal or neural foraminal stenosis.    The marrow signal is normal. Endplate degenerative changes are noted at   C4-5.    No intrinsic spinal cord abnormality is identified.    IMPRESSION:    Multilevel degenerative changes with flattening of the spinal cord at   C4-5 and C5-6.                  
CHIEF COMPLAINT/INTERVAL HISTORY:    Patient is a 73y old  Female who presents with a chief complaint of Syncope and collapse     (01 Nov 2023 09:13)      HPI:  73-year-old female with past medical history of head and neck cancer (squamous cell carcinoma) s/p lymph node excision, endometrial cancer s/p hysterectomy, s/p cholecystectomy, s/p hernia repair, SVT, nonobstructive CAD, HTN, HLD presents with vomiting and diarrhea. Patient states that she had 6 episodes of nonbloody, watery stools and 6 episodes of vomiting today.  She did not urinate today.  Reports diffuse abdominal pain which is described as a soreness after having diarrhea and vomiting.  Patient states that she became dizzy at 6 PM and she lost consciousness.  Unsure if she hit her head.  Her daughter found her lying on the floor.  After she woke up she had some left hand numbness and difficulty expressing herself.  She states that her left hand is no longer numb.  Denies fevers, chest pain, shortness of breath, urinary symptoms, lower extremity swelling.    ER course: VSS.  Labs: Neutrophils 89.5%, potassium 3.1, glucose 139.  UA: Trace ketones.    EKG: pending, f/u result     Imaging:  - HEAD CT: Mild volume loss, microvascular disease, no acute hemorrhage or midline shift.  - CT PERFUSION demonstrated: No core infarct. No active ischemia. If symptoms persist consider follow up head CT or MRI, MRA if no contraindication.  - CTA COW: Patent intracranial circulation without flow limiting stenosis.  - CTA NECK: Calcified plaque with 50-69% stenosis at the ICA origins by NASCET criteria. The plaque on the left side is irregular and could be a source of emboli.Bilateral vertebral arteries are patent without flow limiting stenosis.    Patient was given 1 L of normal saline, continued on normal saline at 125 ml/hr, potassium 40 mEq. She is being admitted to telemetry for further management. (31 Oct 2023 03:28)      + for norvirus, feels better no n/v or diarrhea    ICU Vital Signs Last 24 Hrs  T(C): 36.8 (01 Nov 2023 07:36), Max: 36.8 (01 Nov 2023 07:36)  T(F): 98.2 (01 Nov 2023 07:36), Max: 98.2 (01 Nov 2023 07:36)  HR: 73 (01 Nov 2023 07:36) (70 - 73)  BP: 154/60 (01 Nov 2023 07:36) (141/55 - 154/60)  BP(mean): --  ABP: --  ABP(mean): --  RR: 18 (01 Nov 2023 07:36) (18 - 18)  SpO2: 94% (01 Nov 2023 07:36) (94% - 100%)    O2 Parameters below as of 01 Nov 2023 07:36  Patient On (Oxygen Delivery Method): room air              MEDICATIONS  (STANDING):  aspirin enteric coated 81 milliGRAM(s) Oral daily  atorvastatin 40 milliGRAM(s) Oral at bedtime  enoxaparin Injectable 40 milliGRAM(s) SubCutaneous every 24 hours  influenza  Vaccine (HIGH DOSE) 0.7 milliLiter(s) IntraMuscular once  lactobacillus acidophilus 1 Tablet(s) Oral two times a day with meals  metoprolol succinate ER 25 milliGRAM(s) Oral daily  pantoprazole    Tablet 40 milliGRAM(s) Oral before breakfast  sodium chloride 0.9%. 1000 milliLiter(s) (100 mL/Hr) IV Continuous <Continuous>    MEDICATIONS  (PRN):  acetaminophen     Tablet .. 650 milliGRAM(s) Oral every 6 hours PRN Temp greater or equal to 38C (100.4F), Mild Pain (1 - 3)  aluminum hydroxide/magnesium hydroxide/simethicone Suspension 30 milliLiter(s) Oral every 4 hours PRN Dyspepsia  melatonin 3 milliGRAM(s) Oral at bedtime PRN Insomnia  ondansetron Injectable 4 milliGRAM(s) IV Push every 8 hours PRN Nausea and/or Vomiting        PHYSICAL EXAM:    GENERAL: NAD, well-groomed, well-developed  NERVOUS SYSTEM:  Alert & Oriented X3, Motor Strength 5/5 B/L upper and lower extremities; DTRs 2+ intact and symmetric  CHEST/LUNG: Clear to auscultation bilaterally; No rales, rhonchi, wheezing, or rubs  HEART: Regular rate and rhythm; No murmurs, rubs, or gallops  ABDOMEN: Soft, Nontender, Nondistended; Bowel sounds present  EXTREMITIES:  2+ Peripheral Pulses, No clubbing, cyanosis, or edema    LABS:                        12.1   7.42  )-----------( 196      ( 31 Oct 2023 04:00 )             37.2     10-31    140  |  109<H>  |  10  ----------------------------<  105<H>  4.2   |  27  |  0.71    Ca    8.5      31 Oct 2023 04:00  Mg     2.1     10-31    TPro  8.1  /  Alb  3.9  /  TBili  0.4  /  DBili  x   /  AST  20  /  ALT  19  /  AlkPhos  87  10-30    PT/INR - ( 30 Oct 2023 20:15 )   PT: 11.3 sec;   INR: 1.00 ratio         PTT - ( 30 Oct 2023 20:15 )  PTT:30.4 sec  Urinalysis Basic - ( 31 Oct 2023 04:00 )    Color: x / Appearance: x / SG: x / pH: x  Gluc: 105 mg/dL / Ketone: x  / Bili: x / Urobili: x   Blood: x / Protein: x / Nitrite: x   Leuk Esterase: x / RBC: x / WBC x   Sq Epi: x / Non Sq Epi: x / Bacteria: x        CAPILLARY BLOOD GLUCOSE      POCT Blood Glucose.: 102 mg/dL (31 Oct 2023 22:09)  POCT Blood Glucose.: 101 mg/dL (31 Oct 2023 16:49)      < from: MR Cervical Spine No Cont (10.31.23 @ 21:03) >  Multilevel degenerative changes with flattening of the spinal cord at   C4-5 and C5-6.  personally reviewd      < from: TTE Echo Complete w/o Contrast w/ Doppler (10.31.23 @ 12:40) >     Summary     The left ventricle is normal in size, wall thickness, wall motion and   contractility.   Estimated left ventricular ejection fraction is 55 %.   Normal appearing left atrium.   Normal appearing right atrium.   Normal appearing right ventricle structure and function.   Mild aortic sclerosis is present with normal valvular opening.   Moderate to severe (3+) aortic regurgitation is present.   The mitral valve leaflets appear thickened.   Trace mitral regurgitation is present.   EA reversal of the mitral inflow consistent with reduced compliance of   the   left ventricle.   The tricuspid valve leaflets are thin and pliable; valve motion is   normal.   Moderate Tricuspid regurgitation is present.   No evidence of pericardial effusion.   An interatrial septal aneurysm is noted.           presented with vomiting and diarrhea     1. Syncope likely secondary to orthostatic hypotension/dehydration versus vasovagal from pain versus arrhythmia or structural heart disease   + Norvirus    2. Left hand numbness, expressive aphasia secondary to TIA vs. CVA?   abnormal CT cervical consult neurosx      3. Vomiting and diarrhea secondary to norvirus    4. ICA stenosis  - Patient CTA neck: Calcified plaque with 50-69% stenosis at the ICA origins by NASCET criteria. The plaque on the left side is irregular and could be a source of emboli  - c/w ASA and statin   - Vascular consult - Dr. Barrientos noted    5. Abnormal TTE severe AR  needs OP f/up; EF 55% no need for intervention  interatrial septum defect     History of head and neck cancer (squamous cell carcinoma) s/p lymph node excision, endometrial cancer s/p hysterectomy, s/p cholecystectomy, s/p hernia repair, SVT, nonobstructive CAD, HTN, HLD   - c/w home medications; pharmacist verified with pt's daughter at the bedside -> please verify again in the AM     a1c 6- high risk diabetes      DVT ppx: Lovenox 40 mg subcutaneous daily   Code status: Full code   Plan dc in am if no further intervention pending neurosx consult  Emergency contact: Jess Rodrigez (daughter) [phone] aware  time spent with  reviewing tte, MRI 70 min    
SURGERY DAILY PROGRESS NOTE:     Subjective:  Patient seen and examined this AM at bedside. No acute events overnight and patient resting comfortably. Denies vision changes, redevelopment of numbness/tingling of left hand, fever/chills, shortness of breath, chest pain. VS reviewed    Objective:    MEDICATIONS  (STANDING):  aspirin enteric coated 81 milliGRAM(s) Oral daily  atorvastatin 40 milliGRAM(s) Oral at bedtime  enoxaparin Injectable 40 milliGRAM(s) SubCutaneous every 24 hours  influenza  Vaccine (HIGH DOSE) 0.7 milliLiter(s) IntraMuscular once  lactobacillus acidophilus 1 Tablet(s) Oral two times a day with meals  metoprolol succinate ER 25 milliGRAM(s) Oral daily  pantoprazole    Tablet 40 milliGRAM(s) Oral before breakfast  sodium chloride 0.9%. 1000 milliLiter(s) (100 mL/Hr) IV Continuous <Continuous>    MEDICATIONS  (PRN):  acetaminophen     Tablet .. 650 milliGRAM(s) Oral every 6 hours PRN Temp greater or equal to 38C (100.4F), Mild Pain (1 - 3)  aluminum hydroxide/magnesium hydroxide/simethicone Suspension 30 milliLiter(s) Oral every 4 hours PRN Dyspepsia  melatonin 3 milliGRAM(s) Oral at bedtime PRN Insomnia  ondansetron Injectable 4 milliGRAM(s) IV Push every 8 hours PRN Nausea and/or Vomiting      Vital Signs Last 24 Hrs  T(C): 36.3 (31 Oct 2023 21:57), Max: 36.9 (31 Oct 2023 08:32)  T(F): 97.4 (31 Oct 2023 21:57), Max: 98.5 (31 Oct 2023 08:32)  HR: 70 (31 Oct 2023 21:57) (68 - 96)  BP: 141/55 (31 Oct 2023 21:57) (116/50 - 153/50)  BP(mean): 75 (31 Oct 2023 08:32) (70 - 86)  RR: 18 (31 Oct 2023 21:57) (17 - 22)  SpO2: 100% (31 Oct 2023 21:57) (92% - 100%)    Parameters below as of 31 Oct 2023 21:57  Patient On (Oxygen Delivery Method): room air          PHYSICAL EXAM   GENERAL: NAD, well developed  HEAD: Atraumatic, normocephalic  EYES: EOMI, PERRLA, conjunctiva and sclera clear  ENT: moist mucous membrane  NECK: supple, No JVD, midline trachea  CHEST/LUNG: No increased WOB, symmetric excursions  Heart: RRR ppp, no peripheral edema  ABDOMEN: Round, nondistended, soft, nontender. no organomegaly  EXTREMITIES: Brisk cap refill. no clubbing or cyanosis  NERVOUS SYSTEM: AOx4, speech clear, no neuro-deficits  MSK: full ROM, no deformities  SKIN: warm to touch, no rash or lesions      I&O's Detail      Daily     Daily Weight in k.1 (31 Oct 2023 08:32)    LABS:                        12.1   7.42  )-----------( 196      ( 31 Oct 2023 04:00 )             37.2     10-31    140  |  109<H>  |  10  ----------------------------<  105<H>  4.2   |  27  |  0.71    Ca    8.5      31 Oct 2023 04:00  Mg     2.1     10-31    TPro  8.1  /  Alb  3.9  /  TBili  0.4  /  DBili  x   /  AST  20  /  ALT  19  /  AlkPhos  87  1030    PT/INR - ( 30 Oct 2023 20:15 )   PT: 11.3 sec;   INR: 1.00 ratio         PTT - ( 30 Oct 2023 20:15 )  PTT:30.4 sec  Urinalysis Basic - ( 31 Oct 2023 04:00 )    Color: x / Appearance: x / SG: x / pH: x  Gluc: 105 mg/dL / Ketone: x  / Bili: x / Urobili: x   Blood: x / Protein: x / Nitrite: x   Leuk Esterase: x / RBC: x / WBC x   Sq Epi: x / Non Sq Epi: x / Bacteria: x        RADIOLOGY & ADDITIONAL STUDIES:    10/31/23 carotid duplex    IMPRESSION:    Prominent bilateral carotid artery plaque with irregularity (mild   category less than 50%). No significant hemodynamic stenosis of either   internal carotid artery.    Stenosis of the left external carotid artery.    Measurement of carotid stenosis is based on velocity parameters that   correlate the residual internal carotid diameter with that of the more   distal vessel in accordance with a method such as the [place]   Symptomatic Carotid Endarterectomy Trial (NASCET).      10/31/23 MRI brain read pending    
Pt has no complaints, has slight neck discomfort but numbness in the hand has resolved    MRI brain reveals no acute infarct  MRI C-spine reveals DOC uncovertebral joint hypertrophy with flattening of the spinal cord at C4/5 and C5/6, Moderate   central canal, mild-mod neural foraminal stenosis.      ROS: As above, other ROS Neagtive     MEDICATIONS  (STANDING):  aspirin enteric coated 81 milliGRAM(s) Oral daily  atorvastatin 40 milliGRAM(s) Oral at bedtime  enoxaparin Injectable 40 milliGRAM(s) SubCutaneous every 24 hours  influenza  Vaccine (HIGH DOSE) 0.7 milliLiter(s) IntraMuscular once  lactobacillus acidophilus 1 Tablet(s) Oral two times a day with meals  metoprolol succinate ER 25 milliGRAM(s) Oral daily  pantoprazole    Tablet 40 milliGRAM(s) Oral before breakfast  sodium chloride 0.9%. 1000 milliLiter(s) (100 mL/Hr) IV Continuous <Continuous>      Vital Signs Last 24 Hrs  T(C): 36.9 (01 Nov 2023 20:27), Max: 36.9 (01 Nov 2023 20:27)  T(F): 98.4 (01 Nov 2023 20:27), Max: 98.4 (01 Nov 2023 20:27)  HR: 75 (01 Nov 2023 21:30) (68 - 75)  BP: 149/59 (01 Nov 2023 21:30) (149/59 - 170/72)  BP(mean): --  RR: 18 (01 Nov 2023 20:27) (18 - 18)  SpO2: 100% (01 Nov 2023 20:27) (94% - 100%)    Parameters below as of 01 Nov 2023 20:27  Patient On (Oxygen Delivery Method): room air      Constitutional:   Normocephalic, NAD, awake and alert.  HEENT: PERRLA, EOMI,   Neck: Supple.     Neurological exam:  HF: A x O x 3. Appropriately interactive, normal affect. Speech fluent, No Aphasia or paraphasic errors. Naming /repetition intact   CN: DEBRA, EOMI, VFF, facial sensation normal, no NLFD, tongue midline, Palate moves equally, SCM equal bilaterally  Motor: No pronator drift, Strength 5/5 in all 4 ext, normal bulk and tone, no tremors  Sens: Intact to light touch  Reflexes: Symmetric and normal, no clonus, downgoing toes b/l  Coord:  No FNFA, dysmetria, COBY intact   Gait/Balance: not tested                       12.1   7.42  )-----------( 196      ( 31 Oct 2023 04:00 )             37.2     10-31    140  |  109<H>  |  10  ----------------------------<  105<H>  4.2   |  27  |  0.71    Ca    8.5      31 Oct 2023 04:00  Mg     2.1     10-31        10-31 Chol 182 LDL -- HDL 65 Trig 105    Radiology report:  -< from: MR Head No Cont (10.31.23 @ 21:04) >  IMPRESSION:    No acute infarct.    < from: MR Cervical Spine No Cont (10.31.23 @ 21:03) >    C3-4: Posterior osteophytic ridging. No neural foraminal stenosis.  C4-5: Anterior posterior osteophytes, disc bulge and bilateral   uncovertebral joint hypertrophy. Flattening of the spinal cord. Moderate   central canal, moderate right and mild left neural foraminal stenosis.  C5-6: Posterior osteophytic ridging and bilateral uncovertebral joint   hypertrophy. Mild flattening of the spinal cord. Mild right and moderate   left neural foraminal stenosis.  C6-7: Posterior osteophytic ridging and uncovertebral joint hypertrophy.   Mild central canal stenosis. No neural foraminal stenosis.  C7/T1: No central canal or neural foraminal stenosis.  The marrow signal is normal. Endplate degenerative changes are noted at C4-5.  No intrinsic spinal cord abnormality is identified.    IMPRESSION:    Multilevel degenerative changes with flattening of the spinal cord at C4-5 and C5-6.      CT Angio Neck Stroke Protocol w/ IV Cont (10.30.23 @ 20:03)     IMPRESSION:    HEAD CT: Mild volume loss, microvascular disease, no acute hemorrhage or   midline shift.    Discussed with Dr. Vinson in the ED at 8:07 PM.    CT PERFUSION demonstrated: No core infarct. No active ischemia.  If symptoms persist consider follow up head CT or MRI, MRA  if no   contraindication.    CTA COW:  Patent intracranial circulation without flow limiting stenosis.    CTA NECK: Calcified plaque with 50-69% stenosis at the ICA origins by   NASCET criteria. The plaque on the left side is irregular and could be a   source of emboli. Bilateral vertebral arteries are patent without flow limiting stenosis.

## 2023-11-07 DIAGNOSIS — R73.9 HYPERGLYCEMIA, UNSPECIFIED: ICD-10-CM

## 2023-11-07 DIAGNOSIS — I25.10 ATHEROSCLEROTIC HEART DISEASE OF NATIVE CORONARY ARTERY WITHOUT ANGINA PECTORIS: ICD-10-CM

## 2023-11-07 DIAGNOSIS — E43 UNSPECIFIED SEVERE PROTEIN-CALORIE MALNUTRITION: ICD-10-CM

## 2023-11-07 DIAGNOSIS — Z79.82 LONG TERM (CURRENT) USE OF ASPIRIN: ICD-10-CM

## 2023-11-07 DIAGNOSIS — E87.6 HYPOKALEMIA: ICD-10-CM

## 2023-11-07 DIAGNOSIS — M47.892 OTHER SPONDYLOSIS, CERVICAL REGION: ICD-10-CM

## 2023-11-07 DIAGNOSIS — M54.12 RADICULOPATHY, CERVICAL REGION: ICD-10-CM

## 2023-11-07 DIAGNOSIS — Z11.52 ENCOUNTER FOR SCREENING FOR COVID-19: ICD-10-CM

## 2023-11-07 DIAGNOSIS — I95.1 ORTHOSTATIC HYPOTENSION: ICD-10-CM

## 2023-11-07 DIAGNOSIS — I65.23 OCCLUSION AND STENOSIS OF BILATERAL CAROTID ARTERIES: ICD-10-CM

## 2023-11-07 DIAGNOSIS — Z85.42 PERSONAL HISTORY OF MALIGNANT NEOPLASM OF OTHER PARTS OF UTERUS: ICD-10-CM

## 2023-11-07 DIAGNOSIS — Z88.0 ALLERGY STATUS TO PENICILLIN: ICD-10-CM

## 2023-11-07 DIAGNOSIS — A08.11 ACUTE GASTROENTEROPATHY DUE TO NORWALK AGENT: ICD-10-CM

## 2023-11-07 DIAGNOSIS — Z85.89 PERSONAL HISTORY OF MALIGNANT NEOPLASM OF OTHER ORGANS AND SYSTEMS: ICD-10-CM

## 2023-11-07 DIAGNOSIS — Z90.49 ACQUIRED ABSENCE OF OTHER SPECIFIED PARTS OF DIGESTIVE TRACT: ICD-10-CM

## 2023-11-07 DIAGNOSIS — E78.5 HYPERLIPIDEMIA, UNSPECIFIED: ICD-10-CM

## 2023-11-07 DIAGNOSIS — I10 ESSENTIAL (PRIMARY) HYPERTENSION: ICD-10-CM

## 2023-11-07 DIAGNOSIS — Z90.710 ACQUIRED ABSENCE OF BOTH CERVIX AND UTERUS: ICD-10-CM

## 2023-11-07 DIAGNOSIS — E86.0 DEHYDRATION: ICD-10-CM

## 2023-11-07 DIAGNOSIS — I35.1 NONRHEUMATIC AORTIC (VALVE) INSUFFICIENCY: ICD-10-CM

## 2023-12-18 ENCOUNTER — APPOINTMENT (OUTPATIENT)
Dept: VASCULAR SURGERY | Facility: HOSPITAL | Age: 73
End: 2023-12-18

## 2024-04-05 ENCOUNTER — NON-APPOINTMENT (OUTPATIENT)
Age: 74
End: 2024-04-05

## 2024-04-25 ENCOUNTER — APPOINTMENT (OUTPATIENT)
Dept: CARDIOLOGY | Facility: HOSPITAL | Age: 74
End: 2024-04-25

## 2024-04-25 ENCOUNTER — OUTPATIENT (OUTPATIENT)
Dept: OUTPATIENT SERVICES | Facility: HOSPITAL | Age: 74
LOS: 1 days | End: 2024-04-25
Payer: COMMERCIAL

## 2024-04-25 ENCOUNTER — APPOINTMENT (OUTPATIENT)
Dept: ELECTROPHYSIOLOGY | Facility: CLINIC | Age: 74
End: 2024-04-25
Payer: MEDICAID

## 2024-04-25 VITALS
OXYGEN SATURATION: 100 % | HEART RATE: 67 BPM | HEIGHT: 60 IN | SYSTOLIC BLOOD PRESSURE: 163 MMHG | DIASTOLIC BLOOD PRESSURE: 70 MMHG | BODY MASS INDEX: 21.2 KG/M2 | WEIGHT: 108 LBS

## 2024-04-25 DIAGNOSIS — I25.10 ATHEROSCLEROTIC HEART DISEASE OF NATIVE CORONARY ARTERY WITHOUT ANGINA PECTORIS: ICD-10-CM

## 2024-04-25 DIAGNOSIS — Z98.890 OTHER SPECIFIED POSTPROCEDURAL STATES: Chronic | ICD-10-CM

## 2024-04-25 DIAGNOSIS — I25.10 ATHEROSCLEROTIC HEART DISEASE OF NATIVE CORONARY ARTERY W/OUT ANGINA PECTORIS: ICD-10-CM

## 2024-04-25 PROCEDURE — 93005 ELECTROCARDIOGRAM TRACING: CPT

## 2024-04-25 PROCEDURE — G0463: CPT

## 2024-04-26 DIAGNOSIS — I35.1 NONRHEUMATIC AORTIC (VALVE) INSUFFICIENCY: ICD-10-CM

## 2024-04-29 ENCOUNTER — APPOINTMENT (OUTPATIENT)
Dept: CV DIAGNOSITCS | Facility: HOSPITAL | Age: 74
End: 2024-04-29

## 2024-04-29 ENCOUNTER — OUTPATIENT (OUTPATIENT)
Dept: OUTPATIENT SERVICES | Facility: HOSPITAL | Age: 74
LOS: 1 days | End: 2024-04-29
Payer: SELF-PAY

## 2024-04-29 ENCOUNTER — RESULT REVIEW (OUTPATIENT)
Age: 74
End: 2024-04-29

## 2024-04-29 DIAGNOSIS — I35.1 NONRHEUMATIC AORTIC (VALVE) INSUFFICIENCY: ICD-10-CM

## 2024-04-29 DIAGNOSIS — Z98.890 OTHER SPECIFIED POSTPROCEDURAL STATES: Chronic | ICD-10-CM

## 2024-04-29 PROCEDURE — 93017 CV STRESS TEST TRACING ONLY: CPT

## 2024-04-29 PROCEDURE — 93018 CV STRESS TEST I&R ONLY: CPT

## 2024-04-29 PROCEDURE — 93016 CV STRESS TEST SUPVJ ONLY: CPT

## 2024-04-29 PROCEDURE — C8930: CPT

## 2024-04-29 PROCEDURE — 93351 STRESS TTE COMPLETE: CPT | Mod: 26

## 2024-04-30 LAB
ANION GAP SERPL CALC-SCNC: 11 MMOL/L
BUN SERPL-MCNC: 12 MG/DL
CALCIUM SERPL-MCNC: 10 MG/DL
CHLORIDE SERPL-SCNC: 101 MMOL/L
CHOLEST SERPL-MCNC: 205 MG/DL
CO2 SERPL-SCNC: 27 MMOL/L
CREAT SERPL-MCNC: 0.57 MG/DL
EGFR: 96 ML/MIN/1.73M2
ESTIMATED AVERAGE GLUCOSE: 123 MG/DL
GLUCOSE SERPL-MCNC: 90 MG/DL
HBA1C MFR BLD HPLC: 5.9 %
HDLC SERPL-MCNC: 65 MG/DL
LDLC SERPL CALC-MCNC: 102 MG/DL
NONHDLC SERPL-MCNC: 140 MG/DL
NT-PROBNP SERPL-MCNC: 222 PG/ML
POTASSIUM SERPL-SCNC: 5.2 MMOL/L
SODIUM SERPL-SCNC: 140 MMOL/L
TRIGL SERPL-MCNC: 226 MG/DL

## 2024-05-02 ENCOUNTER — OUTPATIENT (OUTPATIENT)
Dept: OUTPATIENT SERVICES | Facility: HOSPITAL | Age: 74
LOS: 1 days | End: 2024-05-02
Payer: COMMERCIAL

## 2024-05-02 ENCOUNTER — NON-APPOINTMENT (OUTPATIENT)
Age: 74
End: 2024-05-02

## 2024-05-02 ENCOUNTER — APPOINTMENT (OUTPATIENT)
Dept: CARDIOLOGY | Facility: HOSPITAL | Age: 74
End: 2024-05-02

## 2024-05-02 VITALS
HEIGHT: 60 IN | HEART RATE: 70 BPM | SYSTOLIC BLOOD PRESSURE: 152 MMHG | OXYGEN SATURATION: 100 % | WEIGHT: 108 LBS | BODY MASS INDEX: 21.2 KG/M2 | DIASTOLIC BLOOD PRESSURE: 70 MMHG

## 2024-05-02 DIAGNOSIS — I25.10 ATHEROSCLEROTIC HEART DISEASE OF NATIVE CORONARY ARTERY WITHOUT ANGINA PECTORIS: ICD-10-CM

## 2024-05-02 DIAGNOSIS — Z98.890 OTHER SPECIFIED POSTPROCEDURAL STATES: Chronic | ICD-10-CM

## 2024-05-02 PROCEDURE — 93005 ELECTROCARDIOGRAM TRACING: CPT

## 2024-05-02 PROCEDURE — G0463: CPT

## 2024-05-02 NOTE — HISTORY OF PRESENT ILLNESS
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 Echo images not available for review. Today reports occasional shortness of breath while completing normal tasks at home but not always. She reports she is able to climb 2 flights of stairs and walk a few blocks. Denies orthopnea, reports a few nights ago awoke short of breath but notes this was the first occurrence. She does report frequent episodes of dizziness when she stands from bending which occurs 2-3x/week. Denies other LOC episodes. She has been having dizziness like this lasting ~1-2 year. She reports occassional palpitations associated with sharp left-sided chest pain without clear trigger that will last ~10sec and are not associated with dizziness. She will have episodes like this most days. She reports occasional leg swelling that improves with elevation but denies swelling today.    PMH: AR, SVT, nonobstructive CAD, HTN, HLD, SCC of Head/Neck s/p lymph node excision, Endometrial Ca s/p hysterectomy PSH: hysterectomy, lymph node excision, CCY FH: No cardiac hx SH: Denies tobacco, EtOH, illicit drug use Meds: Aspirin 325mg, atorvastatin 40mg, lisinopril 10mg, Metoprolol XL 25mg, Omeprazole 20mg All: PCN (itching in throat and tongue)  [facility] (2009) Coronary vessels: The coronary circulation is right dominant. LM:      LM: Normal. LAD:      Mid LAD: There was a discrete 30 % stenosis. Distal vessel angiography showed a large sized vessel and no disease. D1: There was a discrete 40 % stenosis at the ostium of the vessel segment. Distal vessel angiography showed a large sized vessel and no disease. CX:      Circumflex: Normal. The vessel was large sized. RCA:      RCA: Normal. The vessel was large sized.

## 2024-05-02 NOTE — PHYSICAL EXAM
[Well Developed] : well developed [Well Nourished] : well nourished [No Acute Distress] : no acute distress [Normal Conjunctiva] : normal conjunctiva [Normal Venous Pressure] : normal venous pressure [No Carotid Bruit] : no carotid bruit [Normal S1, S2] : normal S1, S2 [Clear Lung Fields] : clear lung fields [Good Air Entry] : good air entry [No Respiratory Distress] : no respiratory distress  [Soft] : abdomen soft [Non Tender] : non-tender [No Masses/organomegaly] : no masses/organomegaly [Normal Bowel Sounds] : normal bowel sounds [Normal Gait] : normal gait [No Cyanosis] : no cyanosis [No Clubbing] : no clubbing [No Varicosities] : no varicosities [No Rash] : no rash [No Skin Lesions] : no skin lesions [Moves all extremities] : moves all extremities [No Focal Deficits] : no focal deficits [Normal Speech] : normal speech [Alert and Oriented] : alert and oriented [Normal memory] : normal memory [de-identified] : holosystolic III/VI murmur best heard at RUSB, also heard at LLSB [de-identified] : trace-1+ pitting edema of bl LE

## 2024-05-02 NOTE — PHYSICAL EXAM
[Well Developed] : well developed [Well Nourished] : well nourished [No Acute Distress] : no acute distress [Normal Conjunctiva] : normal conjunctiva [Normal Venous Pressure] : normal venous pressure [No Carotid Bruit] : no carotid bruit [Normal S1, S2] : normal S1, S2 [Clear Lung Fields] : clear lung fields [Good Air Entry] : good air entry [No Respiratory Distress] : no respiratory distress  [Soft] : abdomen soft [Non Tender] : non-tender [No Masses/organomegaly] : no masses/organomegaly [Normal Bowel Sounds] : normal bowel sounds [Normal Gait] : normal gait [No Edema] : no edema [No Cyanosis] : no cyanosis [No Clubbing] : no clubbing [No Varicosities] : no varicosities [No Rash] : no rash [No Skin Lesions] : no skin lesions [Moves all extremities] : moves all extremities [No Focal Deficits] : no focal deficits [Normal Speech] : normal speech [Alert and Oriented] : alert and oriented [Normal memory] : normal memory

## 2024-05-02 NOTE — ASSESSMENT
[FreeTextEntry1] : #Mod-Severe AR #Palpitations #Nonobstructive CAD Presenting for follow-up of moderate-severe AR noted during recent hospitalization in 10/2023. Noted in 2009 to have 2+ AI. Reporting only mild symptoms, occasional SOB and single episode of PND. Difficult to assess patient's functional capacity and she may be minimizing symptoms. Will obtain exercise stress echo to assess functional status as well as current level of valvular disease.  - exercise stress TTE - decrease home aspirin to 81mg  #Hx SVT #Palpitations Noted during hospitalization for [facility] in 2009 to have SVT episode with HR 150s. Patient continues to report daily palpitations - Zio patch - cont metoprolol  #HTN - cont amlodipine, lisinopril - will consider adjusting lisinopril dose pending stress TTE

## 2024-05-03 DIAGNOSIS — I35.1 NONRHEUMATIC AORTIC (VALVE) INSUFFICIENCY: ICD-10-CM

## 2024-05-06 ENCOUNTER — APPOINTMENT (OUTPATIENT)
Dept: VASCULAR SURGERY | Facility: HOSPITAL | Age: 74
End: 2024-05-06

## 2024-05-07 ENCOUNTER — APPOINTMENT (OUTPATIENT)
Dept: CARDIOLOGY | Facility: HOSPITAL | Age: 74
End: 2024-05-07

## 2024-05-13 PROBLEM — I35.1 AORTIC VALVE INSUFFICIENCY, ACQUIRED: Status: ACTIVE | Noted: 2024-04-25

## 2024-05-13 PROBLEM — R00.2 PALPITATIONS: Status: ACTIVE | Noted: 2024-05-13

## 2024-05-13 PROBLEM — I47.10 PAROXYSMAL SUPRAVENTRICULAR TACHYCARDIA: Status: ACTIVE | Noted: 2024-04-25

## 2024-05-14 ENCOUNTER — APPOINTMENT (OUTPATIENT)
Dept: CARDIOTHORACIC SURGERY | Facility: CLINIC | Age: 74
End: 2024-05-14
Payer: MEDICAID

## 2024-05-14 VITALS
OXYGEN SATURATION: 98 % | HEART RATE: 67 BPM | SYSTOLIC BLOOD PRESSURE: 149 MMHG | DIASTOLIC BLOOD PRESSURE: 58 MMHG | RESPIRATION RATE: 16 BRPM

## 2024-05-14 VITALS — BODY MASS INDEX: 21.2 KG/M2 | WEIGHT: 108 LBS | HEIGHT: 60 IN

## 2024-05-14 DIAGNOSIS — I50.22 CHRONIC SYSTOLIC (CONGESTIVE) HEART FAILURE: ICD-10-CM

## 2024-05-14 DIAGNOSIS — I47.10 SUPRAVENTRICULAR TACHYCARDIA, UNSPECIFIED: ICD-10-CM

## 2024-05-14 DIAGNOSIS — R60.0 LOCALIZED EDEMA: ICD-10-CM

## 2024-05-14 DIAGNOSIS — I10 ESSENTIAL (PRIMARY) HYPERTENSION: ICD-10-CM

## 2024-05-14 DIAGNOSIS — I35.1 NONRHEUMATIC AORTIC (VALVE) INSUFFICIENCY: ICD-10-CM

## 2024-05-14 DIAGNOSIS — K21.9 GASTRO-ESOPHAGEAL REFLUX DISEASE W/OUT ESOPHAGITIS: ICD-10-CM

## 2024-05-14 DIAGNOSIS — R00.2 PALPITATIONS: ICD-10-CM

## 2024-05-14 PROCEDURE — 99245 OFF/OP CONSLTJ NEW/EST HI 55: CPT

## 2024-05-14 PROCEDURE — 93248 EXT ECG>7D<15D REV&INTERPJ: CPT

## 2024-05-14 NOTE — HISTORY OF PRESENT ILLNESS
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   Patient was hospitalized in 11/2023 at [facility] for episode of syncope related to vomiting and diarrhea. She was found to be Norovirus +. During hospitalization TTE showed AI and patient instructed to follow up outpatient no intervention at that time.  CTA neck during hospitalization showed Calcified plaque with 50-69% stenosis at the ICA origins by NASCET criteria. The plaque on the left side is irregular and could be a source of emboli. She was seen in April 2024 for cardiology care with Dr. Lyle.  During that office visit patient reported intermittent CLAIRE but not always and occasional palpitations. Follow up stress echo showed progression of AI to severe with exercise.

## 2024-05-14 NOTE — PHYSICAL EXAM
[General Appearance - Alert] : alert [Jugular Venous Distention Increased] : there was no jugular-venous distention [Respiration, Rhythm And Depth] : normal respiratory rhythm and effort [Auscultation Breath Sounds / Voice Sounds] : lungs were clear to auscultation bilaterally [Diastolic Grade ___/4] : A grade [unfilled]/4 diastolic murmur was heard. [Examination Of The Chest] : the chest was normal in appearance [Bowel Sounds] : normal bowel sounds [Abdomen Soft] : soft [Skin Color & Pigmentation] : normal skin color and pigmentation [No Focal Deficits] : no focal deficits [Oriented To Time, Place, And Person] : oriented to person, place, and time [Right Carotid Bruit] : no bruit heard over the right carotid [Left Carotid Bruit] : no bruit heard over the left carotid [Involuntary Movements] : no involuntary movements were seen

## 2024-05-14 NOTE — DATA REVIEWED
[FreeTextEntry1] : Stress TTE (5/2/24) 1. Abnormal exercise stress echocardiogram. 2. Patient achieved 4.6 METs, which is consistent with poor exercise capacity. 3. Baseline/resting valve assessment showed moderate to severe aortic regurgitation. 4. At stress, valve assessment revealed severe aortic regurgitation. 5. The heart rate response was exaggerated. 6. Hypertensive blood pressure response to exercise. 7. No echocardiographic evidence of exercise induced ischemia. 8. [place] Left ventricular systolic function is normal. 9. Normal right ventricular cavity size, with normal wall thickness, and normal systolic function. 10. Moderate to severe aortic regurgitation.  [facility] (2009) Coronary vessels: The coronary circulation is right dominant. LM: LM: Normal. LAD: Mid LAD: There was a discrete 30 % stenosis. Distal vessel angiography showed a large sized vessel and no disease. D1: There was a discrete 40 % stenosis at the ostium of the vessel segment. Distal vessel angiography showed a large sized vessel and no disease. CX: Circumflex: Normal. The vessel was large sized. RCA: RCA: Normal. The vessel was large sized.

## 2024-05-14 NOTE — ASSESSMENT
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   Patient was hospitalized in 11/2023 at [facility] for episode of syncope related to vomiting and diarrhea. She was found to be Norovirus +. During hospitalization TTE showed AI and patient instructed to follow up outpatient no intervention at that time.  CTA neck during hospitalization showed Calcified plaque with 50-69% stenosis at the ICA origins by NASCET criteria. The plaque on the left side is irregular and could be a source of emboli. She was seen in April 2024 for cardiology care with Dr. Lyle.  During that office visit patient reported intermittent CLAIRE but not always and occasional palpitations. Follow up stress echo showed progression of AI to severe with exercise.   I have reviewed the patient's medical records, diagnostic images during the time of this office consultation and have made the following recommendation. Review of the imaging shows severe AI and nonobstructive CAD.   Plan 1) Cardiac catherization (LEFT AND RIGHT) 2) Plan for elective Aortic valve replacement (June 3rd) 3) STOP ASPIRIN 7-10 before OR date- depending on if there is obstructive CAD.

## 2024-05-14 NOTE — END OF VISIT
[FreeTextEntry3] : I, Dr. Sandeep Jj, personally performed the evaluation and management (E/M) services for this new patient.  That E/M includes conducting the initial examination, assessing all conditions, and establishing the plan of care.  Today, Celia Quijano NP was here to observe my evaluation and management services for this patient to be followed going forward.

## 2024-05-14 NOTE — REVIEW OF SYSTEMS
[Feeling Tired] : feeling tired [Lower Ext Edema] : lower extremity edema [Chest Pain] : no chest pain [Palpitations] : no palpitations [Cough] : no cough [SOB on Exertion] : no shortness of breath during exertion [Skin Wound] : no skin wound [Dizziness] : no dizziness [Fainting] : no fainting [Anxiety] : no anxiety

## 2024-05-28 ENCOUNTER — OUTPATIENT (OUTPATIENT)
Dept: OUTPATIENT SERVICES | Facility: HOSPITAL | Age: 74
LOS: 1 days | End: 2024-05-28
Payer: COMMERCIAL

## 2024-05-28 VITALS
OXYGEN SATURATION: 96 % | DIASTOLIC BLOOD PRESSURE: 60 MMHG | TEMPERATURE: 98 F | WEIGHT: 106.04 LBS | HEIGHT: 59.45 IN | RESPIRATION RATE: 16 BRPM | SYSTOLIC BLOOD PRESSURE: 142 MMHG | HEART RATE: 64 BPM

## 2024-05-28 DIAGNOSIS — I35.0 NONRHEUMATIC AORTIC (VALVE) STENOSIS: ICD-10-CM

## 2024-05-28 DIAGNOSIS — Z98.890 OTHER SPECIFIED POSTPROCEDURAL STATES: Chronic | ICD-10-CM

## 2024-05-28 DIAGNOSIS — Z29.9 ENCOUNTER FOR PROPHYLACTIC MEASURES, UNSPECIFIED: ICD-10-CM

## 2024-05-28 DIAGNOSIS — Z01.818 ENCOUNTER FOR OTHER PREPROCEDURAL EXAMINATION: ICD-10-CM

## 2024-05-28 LAB
A1C WITH ESTIMATED AVERAGE GLUCOSE RESULT: 5.8 % — HIGH (ref 4–5.6)
BLD GP AB SCN SERPL QL: NEGATIVE — SIGNIFICANT CHANGE UP
CRP SERPL-MCNC: 13 MG/L — HIGH (ref 0–4)
ESTIMATED AVERAGE GLUCOSE: 120 MG/DL — HIGH (ref 68–114)
FERRITIN SERPL-MCNC: 66 NG/ML — SIGNIFICANT CHANGE UP (ref 13–330)
FOLATE SERPL-MCNC: 13.3 NG/ML — SIGNIFICANT CHANGE UP
HCT VFR BLD CALC: 36.7 % — SIGNIFICANT CHANGE UP (ref 34.5–45)
HCV AB S/CO SERPL IA: 0.12 S/CO — SIGNIFICANT CHANGE UP (ref 0–0.99)
HCV AB SERPL-IMP: SIGNIFICANT CHANGE UP
HGB BLD-MCNC: 11.7 G/DL — SIGNIFICANT CHANGE UP (ref 11.5–15.5)
IRON SATN MFR SERPL: 32 UG/DL — SIGNIFICANT CHANGE UP (ref 30–160)
IRON SATN MFR SERPL: 8 % — LOW (ref 14–50)
MCHC RBC-ENTMCNC: 26.7 PG — LOW (ref 27–34)
MCHC RBC-ENTMCNC: 31.9 GM/DL — LOW (ref 32–36)
MCV RBC AUTO: 83.8 FL — SIGNIFICANT CHANGE UP (ref 80–100)
NRBC # BLD: 0 /100 WBCS — SIGNIFICANT CHANGE UP (ref 0–0)
PLATELET # BLD AUTO: 215 K/UL — SIGNIFICANT CHANGE UP (ref 150–400)
RBC # BLD: 4.38 M/UL — SIGNIFICANT CHANGE UP (ref 3.8–5.2)
RBC # BLD: 4.38 M/UL — SIGNIFICANT CHANGE UP (ref 3.8–5.2)
RBC # FLD: 14.8 % — HIGH (ref 10.3–14.5)
RETICS #: 40.7 K/UL — SIGNIFICANT CHANGE UP (ref 25–125)
RETICS/RBC NFR: 0.9 % — SIGNIFICANT CHANGE UP (ref 0.5–2.5)
RH IG SCN BLD-IMP: POSITIVE — SIGNIFICANT CHANGE UP
TIBC SERPL-MCNC: 415 UG/DL — SIGNIFICANT CHANGE UP (ref 220–430)
TRANSFERRIN SERPL-MCNC: 309 MG/DL — SIGNIFICANT CHANGE UP (ref 200–360)
UIBC SERPL-MCNC: 383 UG/DL — HIGH (ref 110–370)
VIT B12 SERPL-MCNC: 458 PG/ML — SIGNIFICANT CHANGE UP (ref 232–1245)
WBC # BLD: 4.74 K/UL — SIGNIFICANT CHANGE UP (ref 3.8–10.5)
WBC # FLD AUTO: 4.74 K/UL — SIGNIFICANT CHANGE UP (ref 3.8–10.5)

## 2024-05-28 PROCEDURE — 87640 STAPH A DNA AMP PROBE: CPT

## 2024-05-28 PROCEDURE — 87641 MR-STAPH DNA AMP PROBE: CPT

## 2024-05-28 PROCEDURE — 93880 EXTRACRANIAL BILAT STUDY: CPT | Mod: 26

## 2024-05-28 PROCEDURE — 84466 ASSAY OF TRANSFERRIN: CPT

## 2024-05-28 PROCEDURE — 86901 BLOOD TYPING SEROLOGIC RH(D): CPT

## 2024-05-28 PROCEDURE — 85045 AUTOMATED RETICULOCYTE COUNT: CPT

## 2024-05-28 PROCEDURE — 86803 HEPATITIS C AB TEST: CPT

## 2024-05-28 PROCEDURE — 83540 ASSAY OF IRON: CPT

## 2024-05-28 PROCEDURE — 80048 BASIC METABOLIC PNL TOTAL CA: CPT

## 2024-05-28 PROCEDURE — 86140 C-REACTIVE PROTEIN: CPT

## 2024-05-28 PROCEDURE — 82728 ASSAY OF FERRITIN: CPT

## 2024-05-28 PROCEDURE — 83036 HEMOGLOBIN GLYCOSYLATED A1C: CPT

## 2024-05-28 PROCEDURE — 71046 X-RAY EXAM CHEST 2 VIEWS: CPT

## 2024-05-28 PROCEDURE — 85027 COMPLETE CBC AUTOMATED: CPT

## 2024-05-28 PROCEDURE — 86850 RBC ANTIBODY SCREEN: CPT

## 2024-05-28 PROCEDURE — 80076 HEPATIC FUNCTION PANEL: CPT

## 2024-05-28 PROCEDURE — 86900 BLOOD TYPING SEROLOGIC ABO: CPT

## 2024-05-28 PROCEDURE — 82746 ASSAY OF FOLIC ACID SERUM: CPT

## 2024-05-28 PROCEDURE — 71046 X-RAY EXAM CHEST 2 VIEWS: CPT | Mod: 26

## 2024-05-28 PROCEDURE — G0463: CPT

## 2024-05-28 PROCEDURE — 83550 IRON BINDING TEST: CPT

## 2024-05-28 PROCEDURE — 82607 VITAMIN B-12: CPT

## 2024-05-28 PROCEDURE — 93880 EXTRACRANIAL BILAT STUDY: CPT

## 2024-05-28 NOTE — H&P PST ADULT - NEUROLOGICAL
normal/cranial nerves II-XII intact/sensation intact negative normal/sensation intact/responds to pain/responds to verbal commands details…

## 2024-05-28 NOTE — H&P PST ADULT - ASSESSMENT
DASI score:  DASI activity: walk, garden, babysit grandchild  Loose teeth or denture: upper and lower    CAPRINI VTE 2.0 SCORE [CLOT updated 2019]    AGE RELATED RISK FACTORS                                                       MOBILITY RELATED FACTORS  [ ] Age 41-60 years                                            (1 Point)                    [ ] Bed rest                                                        (1 Point)  [ ] Age: 61-74 years                                           (2 Points)                  [ ] Plaster cast                                                   (2 Points)  [ ] Age= 75 years                                              (3 Points)                    [ ] Bed bound for more than 72 hours                 (2 Points)    DISEASE RELATED RISK FACTORS                                               GENDER SPECIFIC FACTORS  [ ] Edema in the lower extremities                       (1 Point)              [ ] Pregnancy                                                     (1 Point)  [ ] Varicose veins                                               (1 Point)                     [ ] Post-partum < 6 weeks                                   (1 Point)             [ ] BMI > 25 Kg/m2                                            (1 Point)                     [ ] Hormonal therapy  or oral contraception          (1 Point)                 [ ] Sepsis (in the previous month)                        (1 Point)               [ ] History of pregnancy complications                 (1 point)  [ ] Pneumonia or serious lung disease                                               [ ] Unexplained or recurrent                     (1 Point)           (in the previous month)                               (1 Point)  [ ] Abnormal pulmonary function test                     (1 Point)                 SURGERY RELATED RISK FACTORS  [ ] Acute myocardial infarction                              (1 Point)               [ ]  Section                                             (1 Point)  [ ] Congestive heart failure (in the previous month)  (1 Point)      [ ] Minor surgery                                                  (1 Point)   [ ] Inflammatory bowel disease                             (1 Point)               [ ] Arthroscopic surgery                                        (2 Points)  [ ] Central venous access                                      (2 Points)                [ ] General surgery lasting more than 45 minutes (2 points)  [ ] Malignancy- Present or previous                   (2 Points)                [ ] Elective arthroplasty                                         (5 points)    [ ] Stroke (in the previous month)                          (5 Points)                                                                                                                                                           HEMATOLOGY RELATED FACTORS                                                 TRAUMA RELATED RISK FACTORS  [ ] Prior episodes of VTE                                     (3 Points)                [ ] Fracture of the hip, pelvis, or leg                       (5 Points)  [ ] Positive family history for VTE                         (3 Points)             [ ] Acute spinal cord injury (in the previous month)  (5 Points)  [ ] Prothrombin 91071 A                                     (3 Points)               [ ] Paralysis  (less than 1 month)                             (5 Points)  [ ] Factor V Leiden                                             (3 Points)                  [ ] Multiple Trauma within 1 month                        (5 Points)  [ ] Lupus anticoagulants                                     (3 Points)                                                           [ ] Anticardiolipin antibodies                               (3 Points)                                                       [ ] High homocysteine in the blood                      (3 Points)                                             [ ] Other congenital or acquired thrombophilia      (3 Points)                                                [ ] Heparin induced thrombocytopenia                  (3 Points)                                     Total Score [          ] DASI score: 5.72  DASI activity: walks, gardens, babysits grandchildren  Loose teeth or denture: upper and lower    CAPRINI VTE 2.0 SCORE [CLOT updated 2019]    AGE RELATED RISK FACTORS                                                       MOBILITY RELATED FACTORS  [ ] Age 41-60 years                                            (1 Point)                    [ ] Bed rest                                                        (1 Point)  [x ] Age: 61-74 years                                           (2 Points)                  [ ] Plaster cast                                                   (2 Points)  [ ] Age= 75 years                                              (3 Points)                    [ ] Bed bound for more than 72 hours                 (2 Points)    DISEASE RELATED RISK FACTORS                                               GENDER SPECIFIC FACTORS  [ ] Edema in the lower extremities                       (1 Point)              [ ] Pregnancy                                                     (1 Point)  [ ] Varicose veins                                               (1 Point)                     [ ] Post-partum < 6 weeks                                   (1 Point)             [ ] BMI > 25 Kg/m2                                            (1 Point)                     [ ] Hormonal therapy  or oral contraception          (1 Point)                 [ ] Sepsis (in the previous month)                        (1 Point)               [ ] History of pregnancy complications                 (1 point)  [ ] Pneumonia or serious lung disease                                               [ ] Unexplained or recurrent                     (1 Point)           (in the previous month)                               (1 Point)  [ ] Abnormal pulmonary function test                     (1 Point)                 SURGERY RELATED RISK FACTORS  [ ] Acute myocardial infarction                              (1 Point)               [ ]  Section                                             (1 Point)  [ ] Congestive heart failure (in the previous month)  (1 Point)      [ ] Minor surgery                                                  (1 Point)   [ ] Inflammatory bowel disease                             (1 Point)               [ ] Arthroscopic surgery                                        (2 Points)  [ ] Central venous access                                      (2 Points)                [x ] General surgery lasting more than 45 minutes (2 points)  [ ] Malignancy- Present or previous                   (2 Points)                [ ] Elective arthroplasty                                         (5 points)    [ ] Stroke (in the previous month)                          (5 Points)                                                                                                                                                           HEMATOLOGY RELATED FACTORS                                                 TRAUMA RELATED RISK FACTORS  [ ] Prior episodes of VTE                                     (3 Points)                [ ] Fracture of the hip, pelvis, or leg                       (5 Points)  [ ] Positive family history for VTE                         (3 Points)             [ ] Acute spinal cord injury (in the previous month)  (5 Points)  [ ] Prothrombin 85840 A                                     (3 Points)               [ ] Paralysis  (less than 1 month)                             (5 Points)  [ ] Factor V Leiden                                             (3 Points)                  [ ] Multiple Trauma within 1 month                        (5 Points)  [ ] Lupus anticoagulants                                     (3 Points)                                                           [ ] Anticardiolipin antibodies                               (3 Points)                                                       [ ] High homocysteine in the blood                      (3 Points)                                             [ ] Other congenital or acquired thrombophilia      (3 Points)                                                [ ] Heparin induced thrombocytopenia                  (3 Points)                                     Total Score [     4     ]

## 2024-05-28 NOTE — H&P PST ADULT - MUSCULOSKELETAL
normal/ROM intact/normal gait/strength 5/5 bilateral upper extremities/strength 5/5 bilateral lower extremities negative details… normal/normal gait/strength 5/5 bilateral upper extremities/strength 5/5 bilateral lower extremities

## 2024-05-28 NOTE — H&P PST ADULT - NSICDXPASTMEDICALHX_GEN_ALL_CORE_FT
PAST MEDICAL HISTORY:  CAD (coronary artery disease)     Chest Pain (ICD9 786.50)     Chronic Gastritis (ICD9 535.10)     Dyslipidemia (ICD9 272.4)     H/O: Osteoarthritis (ICD9 V13.4)     History of PSVT (paroxysmal supraventricular tachycardia)     HTN - Hypertension     Incisional hernia     Kidney stone     Prediabetes     SCC (Squamous Cell Carcinoma) (ICD9 173.9) Head and neck 2009 treated with Chem/Radiation/ LN removal    Uterine Cancer (ICD9 179) no chemo no radiation

## 2024-05-28 NOTE — H&P PST ADULT - HISTORY OF PRESENT ILLNESS
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 Pt is scheduled for aortic valve replacement with Dr. Sandeep Jj on 6/3/24. Per pt, she was hospitalized in November due to syncope from vomiting. During her hospitalization she had an echocardiogram that showed severe aortic regurgitation Pt reports palpitations lasting less than a minute before spontaneously resolving, denies triggers and left sided chest pressure.       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 Pt is scheduled for aortic valve replacement with Dr. Sandeep Jj on 6/3/24. Per pt, she was hospitalized in November due to syncope from vomiting. During her hospitalization she had an echocardiogram that showed severe aortic regurgitation Pt reports palpitations lasting less than a minute before spontaneously resolving, denies triggers and left sided chest pressure. Pt reports plans for cardiac catheterization at [facility] on 5/31/24. After examiation with Dr. Jj, the above surgery was recommended.

## 2024-05-28 NOTE — H&P PST ADULT - PROBLEM SELECTOR PLAN 1
pt is scheduled for aortic valve replacement with Dr. Sandeep Jj on 6/3/24  All preoperative instructions reviewed with pt and daughter. Pt and daughter verbalized understanding. All questions answered.

## 2024-05-28 NOTE — H&P PST ADULT - COMMENTS
dizziness when benidng over, 10/2023 syncope,vag burngn,  arthritis in knee and hands. numbess in feet inter. cold,

## 2024-05-29 LAB
MRSA PCR RESULT.: SIGNIFICANT CHANGE UP
S AUREUS DNA NOSE QL NAA+PROBE: SIGNIFICANT CHANGE UP

## 2024-05-31 ENCOUNTER — OUTPATIENT (OUTPATIENT)
Dept: OUTPATIENT SERVICES | Facility: HOSPITAL | Age: 74
LOS: 1 days | End: 2024-05-31
Payer: COMMERCIAL

## 2024-05-31 ENCOUNTER — TRANSCRIPTION ENCOUNTER (OUTPATIENT)
Age: 74
End: 2024-05-31

## 2024-05-31 VITALS
OXYGEN SATURATION: 100 % | WEIGHT: 108.03 LBS | TEMPERATURE: 98 F | RESPIRATION RATE: 15 BRPM | HEART RATE: 68 BPM | HEIGHT: 60 IN | DIASTOLIC BLOOD PRESSURE: 74 MMHG | SYSTOLIC BLOOD PRESSURE: 174 MMHG

## 2024-05-31 VITALS
RESPIRATION RATE: 16 BRPM | OXYGEN SATURATION: 96 % | HEART RATE: 68 BPM | DIASTOLIC BLOOD PRESSURE: 69 MMHG | SYSTOLIC BLOOD PRESSURE: 141 MMHG

## 2024-05-31 DIAGNOSIS — I35.1 NONRHEUMATIC AORTIC (VALVE) INSUFFICIENCY: ICD-10-CM

## 2024-05-31 DIAGNOSIS — Z98.890 OTHER SPECIFIED POSTPROCEDURAL STATES: Chronic | ICD-10-CM

## 2024-05-31 LAB
ALBUMIN SERPL ELPH-MCNC: 4.4 G/DL — SIGNIFICANT CHANGE UP (ref 3.3–5)
ALP SERPL-CCNC: 95 U/L — SIGNIFICANT CHANGE UP (ref 40–120)
ALT FLD-CCNC: 13 U/L — SIGNIFICANT CHANGE UP (ref 10–45)
ANION GAP SERPL CALC-SCNC: 12 MMOL/L — SIGNIFICANT CHANGE UP (ref 5–17)
AST SERPL-CCNC: 17 U/L — SIGNIFICANT CHANGE UP (ref 10–40)
BILIRUB SERPL-MCNC: 0.3 MG/DL — SIGNIFICANT CHANGE UP (ref 0.2–1.2)
BUN SERPL-MCNC: 13 MG/DL — SIGNIFICANT CHANGE UP (ref 7–23)
CALCIUM SERPL-MCNC: 9.8 MG/DL — SIGNIFICANT CHANGE UP (ref 8.4–10.5)
CHLORIDE SERPL-SCNC: 100 MMOL/L — SIGNIFICANT CHANGE UP (ref 96–108)
CO2 SERPL-SCNC: 26 MMOL/L — SIGNIFICANT CHANGE UP (ref 22–31)
CREAT SERPL-MCNC: 0.62 MG/DL — SIGNIFICANT CHANGE UP (ref 0.5–1.3)
EGFR: 94 ML/MIN/1.73M2 — SIGNIFICANT CHANGE UP
GLUCOSE SERPL-MCNC: 112 MG/DL — HIGH (ref 70–99)
HCT VFR BLD CALC: 35 % — SIGNIFICANT CHANGE UP (ref 34.5–45)
HGB BLD-MCNC: 11.3 G/DL — LOW (ref 11.5–15.5)
MCHC RBC-ENTMCNC: 26.8 PG — LOW (ref 27–34)
MCHC RBC-ENTMCNC: 32.3 GM/DL — SIGNIFICANT CHANGE UP (ref 32–36)
MCV RBC AUTO: 83.1 FL — SIGNIFICANT CHANGE UP (ref 80–100)
NRBC # BLD: 0 /100 WBCS — SIGNIFICANT CHANGE UP (ref 0–0)
PLATELET # BLD AUTO: 200 K/UL — SIGNIFICANT CHANGE UP (ref 150–400)
POTASSIUM SERPL-MCNC: 3.8 MMOL/L — SIGNIFICANT CHANGE UP (ref 3.5–5.3)
POTASSIUM SERPL-SCNC: 3.8 MMOL/L — SIGNIFICANT CHANGE UP (ref 3.5–5.3)
PROT SERPL-MCNC: 7.4 G/DL — SIGNIFICANT CHANGE UP (ref 6–8.3)
RBC # BLD: 4.21 M/UL — SIGNIFICANT CHANGE UP (ref 3.8–5.2)
RBC # FLD: 14.9 % — HIGH (ref 10.3–14.5)
SODIUM SERPL-SCNC: 138 MMOL/L — SIGNIFICANT CHANGE UP (ref 135–145)
WBC # BLD: 5.24 K/UL — SIGNIFICANT CHANGE UP (ref 3.8–10.5)
WBC # FLD AUTO: 5.24 K/UL — SIGNIFICANT CHANGE UP (ref 3.8–10.5)

## 2024-05-31 PROCEDURE — 99152 MOD SED SAME PHYS/QHP 5/>YRS: CPT

## 2024-05-31 PROCEDURE — 93460 R&L HRT ART/VENTRICLE ANGIO: CPT

## 2024-05-31 PROCEDURE — C1887: CPT

## 2024-05-31 PROCEDURE — C1769: CPT

## 2024-05-31 PROCEDURE — 85027 COMPLETE CBC AUTOMATED: CPT

## 2024-05-31 PROCEDURE — T1013: CPT

## 2024-05-31 PROCEDURE — 93460 R&L HRT ART/VENTRICLE ANGIO: CPT | Mod: 26

## 2024-05-31 PROCEDURE — C1889: CPT

## 2024-05-31 PROCEDURE — 82803 BLOOD GASES ANY COMBINATION: CPT

## 2024-05-31 PROCEDURE — 93567 NJX CAR CTH SPRVLV AORTGRPHY: CPT

## 2024-05-31 PROCEDURE — C1894: CPT

## 2024-05-31 PROCEDURE — 80053 COMPREHEN METABOLIC PANEL: CPT

## 2024-05-31 PROCEDURE — 93005 ELECTROCARDIOGRAM TRACING: CPT

## 2024-05-31 PROCEDURE — 93010 ELECTROCARDIOGRAM REPORT: CPT

## 2024-05-31 RX ORDER — SODIUM CHLORIDE 9 MG/ML
1000 INJECTION INTRAMUSCULAR; INTRAVENOUS; SUBCUTANEOUS
Refills: 0 | Status: COMPLETED | OUTPATIENT
Start: 2024-05-31 | End: 2024-05-31

## 2024-05-31 RX ORDER — SODIUM CHLORIDE 9 MG/ML
250 INJECTION INTRAMUSCULAR; INTRAVENOUS; SUBCUTANEOUS ONCE
Refills: 0 | Status: COMPLETED | OUTPATIENT
Start: 2024-05-31 | End: 2024-05-31

## 2024-05-31 RX ORDER — ACETAMINOPHEN 500 MG
2 TABLET ORAL
Refills: 0 | DISCHARGE

## 2024-05-31 RX ORDER — ASPIRIN/CALCIUM CARB/MAGNESIUM 324 MG
1 TABLET ORAL
Refills: 0 | DISCHARGE

## 2024-05-31 RX ORDER — OMEPRAZOLE 10 MG/1
1 CAPSULE, DELAYED RELEASE ORAL
Refills: 0 | DISCHARGE

## 2024-05-31 RX ORDER — LISINOPRIL 2.5 MG/1
1 TABLET ORAL
Qty: 0 | Refills: 0 | DISCHARGE

## 2024-05-31 RX ADMIN — SODIUM CHLORIDE 75 MILLILITER(S): 9 INJECTION INTRAMUSCULAR; INTRAVENOUS; SUBCUTANEOUS at 09:12

## 2024-05-31 RX ADMIN — SODIUM CHLORIDE 250 MILLILITER(S): 9 INJECTION INTRAMUSCULAR; INTRAVENOUS; SUBCUTANEOUS at 10:14

## 2024-05-31 RX ADMIN — SODIUM CHLORIDE 75 MILLILITER(S): 9 INJECTION INTRAMUSCULAR; INTRAVENOUS; SUBCUTANEOUS at 10:13

## 2024-05-31 NOTE — ASU DISCHARGE PLAN (ADULT/PEDIATRIC) - CARE PROVIDER_API CALL
Sandeep Jj  Thoracic and Cardiac Surgery  [address] [place] [id number]  Phone: [phone]  Fax: [phone]  Follow Up Time: Routine

## 2024-05-31 NOTE — ASU DISCHARGE PLAN (ADULT/PEDIATRIC) - ASU DC SPECIAL INSTRUCTIONSFT
Wound Care:   the day AFTER your procedure remove bandage GENTTLY, and clean using  mild soap and gentle warm, water stream, pat dry. leave OPEN to air. YOU MAY SHOWER   DO NOT apply lotions, creams, ointments, powder, parfumes to your incision site  DO NOT SOAK your site for 1 week ( no baths, no pools, no tubs, etc...)  Check  your groin and /or wirst daily.A small amount of bruising, and soarness are normal    ACTIVITY: for 24 hours   - DO NOT DRIVE  - DO NOT make any important decisions or sign legal documents   - DO NOT operate heavy machinaries   - you may resume sexual activity in 48 hours, unless otherwise instructed by your cardiologist     If your procedure was done through the WRIST: for the NEXT 3DAYS:  - avoid pushing, pulling, with that affected wrist   - avoid repeated movement of that hand and wrist ( eg: typing, hammering)  - DO NOT LIFT anything more than 5 lbs     If your procedure was done through the GROIN: for the NEXT 5 DAYS  - Limit climbing stairs, DO NOT soak in bathtub or pool  - no strenous activities, pushing, pulling, straining  - Do not lift anything 10lbs or heavier     MEDICATION:   take your medications as explained ( see discharge paperwork)   If you received a STENT, you will be taking antiplatelet medications to KEEP YOUR STENT OPEN ( eg: Aspirin, Plavix, Brilinta, Effient, etc).  Take as prescribed DO NOT STOP taking them without consulting with your cardiologist first.     Follow heart healthy diet reccomended by your doctor, , if you smoke STOP SMOKING ( may call [phone] for center of tobacco control if you need assistance)     CALL your doctor to make appointment in 2 WEEKS     ***CALL YOUR DOCTOR***  if you experience: fever, chills, body aches, or severe pain, swelling, redness, heat or yellow discharge at incision site  If you experience Bleeding or excruciating pain at the procedural site, sweliing ( golf ball size) at your procedural site  If you experience CHEST PAIN  If you experience extremity numbness, tingling, temperature change ( of your procedural site)   If you are unable to reach your doctor, you may contact:   -Cardiology Office at [facility] at [phone] or   - [facility] [phone] - [facility] [phone]

## 2024-05-31 NOTE — H&P CARDIOLOGY - HISTORY OF PRESENT ILLNESS
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 She was found to be Norovirus +. During hospitalization TTE showed AI and patient instructed to follow up outpatient no intervention at that time. CTA neck during hospitalization showed Calcified plaque with 50-69% stenosis at the ICA origins by NASCET criteria. The plaque on the left side is irregular and could be a source of emboli. She was seen in April 2024 for cardiology care with Dr. Lyle. During that office visit patient reported intermittent CLAIRE but not always and occasional palpitations. Follow up stress echo showed progression of AI to severe with exercise.     Plan for elective Aortic valve replacement (June 3rd). Pt was referred for Cardiac cath by Dr Sandeep Jj to know the coronary anatomy pre-op for AVR.       Stress TTE (5/2/24)  1. Abnormal exercise stress echocardiogram.  2. Patient achieved 4.6 METs, which is consistent with poor exercise capacity.  3. Baseline/resting valve assessment showed moderate to severe aortic regurgitation.  4. At stress, valve assessment revealed severe aortic regurgitation.  5. The heart rate response was exaggerated.  6. Hypertensive blood pressure response to exercise.  7. No echocardiographic evidence of exercise induced ischemia.  8. Basseline Left ventricular systolic function is normal.  9. Normal right ventricular cavity size, with normal wall thickness, and normal systolic function.  10. Moderate to severe aortic regurgitation.  Left Ventricle:  Left ventricular systolic function is normal.  Right Ventricle:  The right ventricular cavity is normal in size, with normal wall thickness and normal systolic function.  Aortic Valve:  The aortic valve appears trileaflet. There is moderate to severe aortic regurgitation.       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 She was found to be Norovirus +. During hospitalization TTE showed AI and patient instructed to follow up outpatient no intervention at that time. CTA neck during hospitalization showed Calcified plaque with 50-69% stenosis at the ICA origins by NASCET criteria. The plaque on the left side is irregular and could be a source of emboli. She was seen in April 2024 for cardiology care with Dr. Lyle. During that office visit patient reported intermittent CLAIRE but not always and occasional palpitations. Follow up stress echo showed progression of AI to severe with exercise.     Plan for elective Aortic valve replacement (June 5th). Pt was referred for Cardiac cath by Dr Sandeep Jj to know the coronary anatomy pre-op for AVR.       Stress TTE (5/2/24)  1. Abnormal exercise stress echocardiogram.  2. Patient achieved 4.6 METs, which is consistent with poor exercise capacity.  3. Baseline/resting valve assessment showed moderate to severe aortic regurgitation.  4. At stress, valve assessment revealed severe aortic regurgitation.  5. The heart rate response was exaggerated.  6. Hypertensive blood pressure response to exercise.  7. No echocardiographic evidence of exercise induced ischemia.  8. Basseline Left ventricular systolic function is normal.  9. Normal right ventricular cavity size, with normal wall thickness, and normal systolic function.  10. Moderate to severe aortic regurgitation.  Left Ventricle:  Left ventricular systolic function is normal.  Right Ventricle:  The right ventricular cavity is normal in size, with normal wall thickness and normal systolic function.  Aortic Valve:  The aortic valve appears trileaflet. There is moderate to severe aortic regurgitation.

## 2024-05-31 NOTE — ASU PATIENT PROFILE, ADULT - FALL HARM RISK - UNIVERSAL INTERVENTIONS
Bed in lowest position, wheels locked, appropriate side rails in place/Call bell, personal items and telephone in reach/Instruct patient to call for assistance before getting out of bed or chair/Non-slip footwear when patient is out of bed/[place] to call system/Physically safe environment - no spills, clutter or unnecessary equipment/Purposeful Proactive Rounding/Room/bathroom lighting operational, light cord in reach

## 2024-06-04 ENCOUNTER — TRANSCRIPTION ENCOUNTER (OUTPATIENT)
Age: 74
End: 2024-06-04

## 2024-06-04 PROBLEM — R73.03 PREDIABETES: Chronic | Status: ACTIVE | Noted: 2024-05-28

## 2024-06-05 ENCOUNTER — INPATIENT (INPATIENT)
Facility: HOSPITAL | Age: 74
LOS: 6 days | Discharge: HOME HEALTH | DRG: 220 | End: 2024-06-12
Attending: THORACIC SURGERY (CARDIOTHORACIC VASCULAR SURGERY) | Admitting: THORACIC SURGERY (CARDIOTHORACIC VASCULAR SURGERY)
Payer: COMMERCIAL

## 2024-06-05 ENCOUNTER — APPOINTMENT (OUTPATIENT)
Dept: CARDIOTHORACIC SURGERY | Facility: HOSPITAL | Age: 74
End: 2024-06-05

## 2024-06-05 ENCOUNTER — RESULT REVIEW (OUTPATIENT)
Age: 74
End: 2024-06-05

## 2024-06-05 VITALS
OXYGEN SATURATION: 98 % | HEIGHT: 59.45 IN | DIASTOLIC BLOOD PRESSURE: 70 MMHG | WEIGHT: 105.82 LBS | HEART RATE: 72 BPM | SYSTOLIC BLOOD PRESSURE: 181 MMHG

## 2024-06-05 DIAGNOSIS — I35.0 NONRHEUMATIC AORTIC (VALVE) STENOSIS: ICD-10-CM

## 2024-06-05 DIAGNOSIS — Z98.890 OTHER SPECIFIED POSTPROCEDURAL STATES: Chronic | ICD-10-CM

## 2024-06-05 LAB
ALBUMIN SERPL ELPH-MCNC: 3.1 G/DL — LOW (ref 3.3–5)
ALP SERPL-CCNC: 62 U/L — SIGNIFICANT CHANGE UP (ref 40–120)
ALT FLD-CCNC: 27 U/L — SIGNIFICANT CHANGE UP (ref 10–45)
ANION GAP SERPL CALC-SCNC: 16 MMOL/L — SIGNIFICANT CHANGE UP (ref 5–17)
APTT BLD: 30.9 SEC — SIGNIFICANT CHANGE UP (ref 24.5–35.6)
AST SERPL-CCNC: 54 U/L — HIGH (ref 10–40)
BASE EXCESS BLDV CALC-SCNC: -1 MMOL/L — SIGNIFICANT CHANGE UP (ref -2–3)
BASE EXCESS BLDV CALC-SCNC: 1.8 MMOL/L — SIGNIFICANT CHANGE UP (ref -2–3)
BASE EXCESS BLDV CALC-SCNC: 2.2 MMOL/L — SIGNIFICANT CHANGE UP (ref -2–3)
BASOPHILS # BLD AUTO: 0 K/UL — SIGNIFICANT CHANGE UP (ref 0–0.2)
BASOPHILS # BLD AUTO: 0.01 K/UL — SIGNIFICANT CHANGE UP (ref 0–0.2)
BASOPHILS NFR BLD AUTO: 0 % — SIGNIFICANT CHANGE UP (ref 0–2)
BASOPHILS NFR BLD AUTO: 0.1 % — SIGNIFICANT CHANGE UP (ref 0–2)
BILIRUB SERPL-MCNC: 0.6 MG/DL — SIGNIFICANT CHANGE UP (ref 0.2–1.2)
BUN SERPL-MCNC: 12 MG/DL — SIGNIFICANT CHANGE UP (ref 7–23)
CA-I SERPL-SCNC: 0.77 MMOL/L — LOW (ref 1.15–1.33)
CA-I SERPL-SCNC: 0.79 MMOL/L — LOW (ref 1.15–1.33)
CA-I SERPL-SCNC: 0.85 MMOL/L — LOW (ref 1.15–1.33)
CALCIUM SERPL-MCNC: 9.1 MG/DL — SIGNIFICANT CHANGE UP (ref 8.4–10.5)
CHLORIDE BLDV-SCNC: 100 MMOL/L — SIGNIFICANT CHANGE UP (ref 96–108)
CHLORIDE BLDV-SCNC: 104 MMOL/L — SIGNIFICANT CHANGE UP (ref 96–108)
CHLORIDE BLDV-SCNC: 107 MMOL/L — SIGNIFICANT CHANGE UP (ref 96–108)
CHLORIDE SERPL-SCNC: 106 MMOL/L — SIGNIFICANT CHANGE UP (ref 96–108)
CK MB BLD-MCNC: 12.1 % — HIGH (ref 0–3.5)
CK MB CFR SERPL CALC: 22.1 NG/ML — HIGH (ref 0–3.8)
CK SERPL-CCNC: 182 U/L — HIGH (ref 25–170)
CO2 BLDV-SCNC: 27 MMOL/L — HIGH (ref 22–26)
CO2 BLDV-SCNC: 28 MMOL/L — HIGH (ref 22–26)
CO2 BLDV-SCNC: 29 MMOL/L — HIGH (ref 22–26)
CO2 SERPL-SCNC: 23 MMOL/L — SIGNIFICANT CHANGE UP (ref 22–31)
CREAT SERPL-MCNC: 0.46 MG/DL — LOW (ref 0.5–1.3)
EGFR: 101 ML/MIN/1.73M2 — SIGNIFICANT CHANGE UP
EOSINOPHIL # BLD AUTO: 0 K/UL — SIGNIFICANT CHANGE UP (ref 0–0.5)
EOSINOPHIL # BLD AUTO: 0 K/UL — SIGNIFICANT CHANGE UP (ref 0–0.5)
EOSINOPHIL NFR BLD AUTO: 0 % — SIGNIFICANT CHANGE UP (ref 0–6)
EOSINOPHIL NFR BLD AUTO: 0 % — SIGNIFICANT CHANGE UP (ref 0–6)
FIBRINOGEN PPP-MCNC: 304 MG/DL — SIGNIFICANT CHANGE UP (ref 200–445)
GAS PNL BLDA: SIGNIFICANT CHANGE UP
GAS PNL BLDV: 137 MMOL/L — SIGNIFICANT CHANGE UP (ref 136–145)
GAS PNL BLDV: SIGNIFICANT CHANGE UP
GLUCOSE BLDV-MCNC: 113 MG/DL — HIGH (ref 70–99)
GLUCOSE BLDV-MCNC: 154 MG/DL — HIGH (ref 70–99)
GLUCOSE BLDV-MCNC: 180 MG/DL — HIGH (ref 70–99)
GLUCOSE SERPL-MCNC: 133 MG/DL — HIGH (ref 70–99)
HCO3 BLDV-SCNC: 25 MMOL/L — SIGNIFICANT CHANGE UP (ref 22–29)
HCO3 BLDV-SCNC: 27 MMOL/L — SIGNIFICANT CHANGE UP (ref 22–29)
HCO3 BLDV-SCNC: 27 MMOL/L — SIGNIFICANT CHANGE UP (ref 22–29)
HCT VFR BLD CALC: 29.1 % — LOW (ref 34.5–45)
HCT VFR BLD CALC: 31.6 % — LOW (ref 34.5–45)
HCT VFR BLDA CALC: 24 % — LOW (ref 34.5–46.5)
HCT VFR BLDA CALC: 24 % — LOW (ref 34.5–46.5)
HCT VFR BLDA CALC: 27 % — LOW (ref 34.5–46.5)
HGB BLD CALC-MCNC: 8 G/DL — LOW (ref 11.7–16.1)
HGB BLD CALC-MCNC: 8.1 G/DL — LOW (ref 11.7–16.1)
HGB BLD CALC-MCNC: 8.9 G/DL — LOW (ref 11.7–16.1)
HGB BLD-MCNC: 10.1 G/DL — LOW (ref 11.5–15.5)
HGB BLD-MCNC: 10.7 G/DL — LOW (ref 11.5–15.5)
IMM GRANULOCYTES NFR BLD AUTO: 0.3 % — SIGNIFICANT CHANGE UP (ref 0–0.9)
INR BLD: 1.26 RATIO — HIGH (ref 0.85–1.18)
LACTATE BLDV-MCNC: 0.8 MMOL/L — SIGNIFICANT CHANGE UP (ref 0.5–2)
LACTATE BLDV-MCNC: 0.9 MMOL/L — SIGNIFICANT CHANGE UP (ref 0.5–2)
LACTATE BLDV-MCNC: 1.2 MMOL/L — SIGNIFICANT CHANGE UP (ref 0.5–2)
LYMPHOCYTES # BLD AUTO: 0.23 K/UL — LOW (ref 1–3.3)
LYMPHOCYTES # BLD AUTO: 0.59 K/UL — LOW (ref 1–3.3)
LYMPHOCYTES # BLD AUTO: 3.5 % — LOW (ref 13–44)
LYMPHOCYTES # BLD AUTO: 7.6 % — LOW (ref 13–44)
MANUAL SMEAR VERIFICATION: SIGNIFICANT CHANGE UP
MCHC RBC-ENTMCNC: 28.2 PG — SIGNIFICANT CHANGE UP (ref 27–34)
MCHC RBC-ENTMCNC: 28.6 PG — SIGNIFICANT CHANGE UP (ref 27–34)
MCHC RBC-ENTMCNC: 33.9 GM/DL — SIGNIFICANT CHANGE UP (ref 32–36)
MCHC RBC-ENTMCNC: 34.7 GM/DL — SIGNIFICANT CHANGE UP (ref 32–36)
MCV RBC AUTO: 82.4 FL — SIGNIFICANT CHANGE UP (ref 80–100)
MCV RBC AUTO: 83.4 FL — SIGNIFICANT CHANGE UP (ref 80–100)
MONOCYTES # BLD AUTO: 0.12 K/UL — SIGNIFICANT CHANGE UP (ref 0–0.9)
MONOCYTES # BLD AUTO: 0.67 K/UL — SIGNIFICANT CHANGE UP (ref 0–0.9)
MONOCYTES NFR BLD AUTO: 1.8 % — LOW (ref 2–14)
MONOCYTES NFR BLD AUTO: 8.7 % — SIGNIFICANT CHANGE UP (ref 2–14)
NEUTROPHILS # BLD AUTO: 6.07 K/UL — SIGNIFICANT CHANGE UP (ref 1.8–7.4)
NEUTROPHILS # BLD AUTO: 6.43 K/UL — SIGNIFICANT CHANGE UP (ref 1.8–7.4)
NEUTROPHILS NFR BLD AUTO: 83.3 % — HIGH (ref 43–77)
NEUTROPHILS NFR BLD AUTO: 86.8 % — HIGH (ref 43–77)
NEUTS BAND # BLD: 4.4 % — SIGNIFICANT CHANGE UP (ref 0–8)
NRBC # BLD: 0 /100 WBCS — SIGNIFICANT CHANGE UP (ref 0–0)
PCO2 BLDV: 42 MMHG — SIGNIFICANT CHANGE UP (ref 39–42)
PCO2 BLDV: 44 MMHG — HIGH (ref 39–42)
PCO2 BLDV: 49 MMHG — HIGH (ref 39–42)
PH BLDV: 7.32 — SIGNIFICANT CHANGE UP (ref 7.32–7.43)
PH BLDV: 7.4 — SIGNIFICANT CHANGE UP (ref 7.32–7.43)
PH BLDV: 7.41 — SIGNIFICANT CHANGE UP (ref 7.32–7.43)
PLAT MORPH BLD: ABNORMAL
PLATELET # BLD AUTO: 181 K/UL — SIGNIFICANT CHANGE UP (ref 150–400)
PLATELET # BLD AUTO: SIGNIFICANT CHANGE UP K/UL (ref 150–400)
PO2 BLDV: 177 MMHG — HIGH (ref 25–45)
PO2 BLDV: 58 MMHG — HIGH (ref 25–45)
PO2 BLDV: 86 MMHG — HIGH (ref 25–45)
POTASSIUM BLDA-SCNC: 3.5 MMOL/L — SIGNIFICANT CHANGE UP (ref 3.5–5.1)
POTASSIUM BLDV-SCNC: 4.1 MMOL/L — SIGNIFICANT CHANGE UP (ref 3.5–5.1)
POTASSIUM BLDV-SCNC: 6.3 MMOL/L — CRITICAL HIGH (ref 3.5–5.1)
POTASSIUM BLDV-SCNC: 6.4 MMOL/L — CRITICAL HIGH (ref 3.5–5.1)
POTASSIUM SERPL-MCNC: 3.5 MMOL/L — SIGNIFICANT CHANGE UP (ref 3.5–5.3)
POTASSIUM SERPL-SCNC: 3.5 MMOL/L — SIGNIFICANT CHANGE UP (ref 3.5–5.3)
PROT SERPL-MCNC: 4.9 G/DL — LOW (ref 6–8.3)
PROTHROM AB SERPL-ACNC: 13.8 SEC — HIGH (ref 9.5–13)
RBC # BLD: 3.53 M/UL — LOW (ref 3.8–5.2)
RBC # BLD: 3.79 M/UL — LOW (ref 3.8–5.2)
RBC # FLD: 14.7 % — HIGH (ref 10.3–14.5)
RBC # FLD: 14.9 % — HIGH (ref 10.3–14.5)
RBC BLD AUTO: SIGNIFICANT CHANGE UP
SAO2 % BLDV: 100 % — HIGH (ref 67–88)
SAO2 % BLDV: 94.7 % — HIGH (ref 67–88)
SAO2 % BLDV: 98.4 % — HIGH (ref 67–88)
SODIUM SERPL-SCNC: 145 MMOL/L — SIGNIFICANT CHANGE UP (ref 135–145)
TROPONIN T, HIGH SENSITIVITY RESULT: 632 NG/L — HIGH (ref 0–51)
VARIANT LYMPHS # BLD: 3.5 % — SIGNIFICANT CHANGE UP (ref 0–6)
WBC # BLD: 6.66 K/UL — SIGNIFICANT CHANGE UP (ref 3.8–10.5)
WBC # BLD: 7.72 K/UL — SIGNIFICANT CHANGE UP (ref 3.8–10.5)
WBC # FLD AUTO: 6.66 K/UL — SIGNIFICANT CHANGE UP (ref 3.8–10.5)
WBC # FLD AUTO: 7.72 K/UL — SIGNIFICANT CHANGE UP (ref 3.8–10.5)

## 2024-06-05 PROCEDURE — 88305 TISSUE EXAM BY PATHOLOGIST: CPT | Mod: 26

## 2024-06-05 PROCEDURE — 99291 CRITICAL CARE FIRST HOUR: CPT | Mod: 25

## 2024-06-05 PROCEDURE — 93010 ELECTROCARDIOGRAM REPORT: CPT

## 2024-06-05 PROCEDURE — 33405 REPLACEMENT AORTIC VALVE OPN: CPT

## 2024-06-05 PROCEDURE — 71045 X-RAY EXAM CHEST 1 VIEW: CPT | Mod: 26

## 2024-06-05 DEVICE — BIOGLUE 10ML SYR: Type: IMPLANTABLE DEVICE | Status: FUNCTIONAL

## 2024-06-05 DEVICE — CANNULA VENOUS 2 STAGE LIGHTHOUSE TIP 28-38FR X 1/2" NON-VENTED: Type: IMPLANTABLE DEVICE | Status: FUNCTIONAL

## 2024-06-05 DEVICE — CANNULA RCC MANUALLY INFLATING W/GWIRE 15FR: Type: IMPLANTABLE DEVICE | Status: FUNCTIONAL

## 2024-06-05 DEVICE — KIT A-LINE 1LUM 18G X 16CM: Type: IMPLANTABLE DEVICE | Status: FUNCTIONAL

## 2024-06-05 DEVICE — VALVE AORTIC INSPIRIS RESILIA 23MM: Type: IMPLANTABLE DEVICE | Status: FUNCTIONAL

## 2024-06-05 DEVICE — SURGICEL FIBRILLAR 2 X 4": Type: IMPLANTABLE DEVICE | Status: FUNCTIONAL

## 2024-06-05 DEVICE — PACING WIRE WHITE M-24 BAREWIRE 37MM X 89MM: Type: IMPLANTABLE DEVICE | Status: FUNCTIONAL

## 2024-06-05 DEVICE — CANNULA AORTIC PERFUSION EZ GLIDE STRAIGHT 21FR X 35CM NON-VENTED: Type: IMPLANTABLE DEVICE | Status: FUNCTIONAL

## 2024-06-05 DEVICE — KIT CVC 2LUM MAC 9FR CHG: Type: IMPLANTABLE DEVICE | Status: FUNCTIONAL

## 2024-06-05 DEVICE — CATH VENT LEFT HEART SILICONE 20FR NON-VENTED: Type: IMPLANTABLE DEVICE | Status: FUNCTIONAL

## 2024-06-05 RX ORDER — NICARDIPINE HYDROCHLORIDE 30 MG/1
5 CAPSULE, EXTENDED RELEASE ORAL
Qty: 40 | Refills: 0 | Status: DISCONTINUED | OUTPATIENT
Start: 2024-06-05 | End: 2024-06-06

## 2024-06-05 RX ORDER — SODIUM CHLORIDE 9 MG/ML
1000 INJECTION, SOLUTION INTRAVENOUS ONCE
Refills: 0 | Status: COMPLETED | OUTPATIENT
Start: 2024-06-05 | End: 2024-06-05

## 2024-06-05 RX ORDER — DEXTROSE 50 % IN WATER 50 %
50 SYRINGE (ML) INTRAVENOUS
Refills: 0 | Status: DISCONTINUED | OUTPATIENT
Start: 2024-06-05 | End: 2024-06-08

## 2024-06-05 RX ORDER — DEXTROSE 50 % IN WATER 50 %
25 SYRINGE (ML) INTRAVENOUS
Refills: 0 | Status: DISCONTINUED | OUTPATIENT
Start: 2024-06-05 | End: 2024-06-06

## 2024-06-05 RX ORDER — CHLORHEXIDINE GLUCONATE 213 G/1000ML
1 SOLUTION TOPICAL DAILY
Refills: 0 | Status: DISCONTINUED | OUTPATIENT
Start: 2024-06-05 | End: 2024-06-12

## 2024-06-05 RX ORDER — POTASSIUM CHLORIDE 20 MEQ
10 PACKET (EA) ORAL
Refills: 0 | Status: DISCONTINUED | OUTPATIENT
Start: 2024-06-05 | End: 2024-06-06

## 2024-06-05 RX ORDER — POTASSIUM CHLORIDE 20 MEQ
10 PACKET (EA) ORAL
Refills: 0 | Status: COMPLETED | OUTPATIENT
Start: 2024-06-05 | End: 2024-06-05

## 2024-06-05 RX ORDER — PANTOPRAZOLE SODIUM 20 MG/1
40 TABLET, DELAYED RELEASE ORAL DAILY
Refills: 0 | Status: DISCONTINUED | OUTPATIENT
Start: 2024-06-05 | End: 2024-06-06

## 2024-06-05 RX ORDER — ASCORBIC ACID 60 MG
500 TABLET,CHEWABLE ORAL
Refills: 0 | Status: COMPLETED | OUTPATIENT
Start: 2024-06-05 | End: 2024-06-10

## 2024-06-05 RX ORDER — NOREPINEPHRINE BITARTRATE/D5W 8 MG/250ML
0.04 PLASTIC BAG, INJECTION (ML) INTRAVENOUS
Qty: 8 | Refills: 0 | Status: DISCONTINUED | OUTPATIENT
Start: 2024-06-05 | End: 2024-06-06

## 2024-06-05 RX ORDER — SODIUM CHLORIDE 9 MG/ML
3 INJECTION INTRAMUSCULAR; INTRAVENOUS; SUBCUTANEOUS EVERY 8 HOURS
Refills: 0 | Status: DISCONTINUED | OUTPATIENT
Start: 2024-06-05 | End: 2024-06-05

## 2024-06-05 RX ORDER — OXYCODONE HYDROCHLORIDE 5 MG/1
10 TABLET ORAL EVERY 4 HOURS
Refills: 0 | Status: DISCONTINUED | OUTPATIENT
Start: 2024-06-05 | End: 2024-06-12

## 2024-06-05 RX ORDER — CHLORHEXIDINE GLUCONATE 213 G/1000ML
15 SOLUTION TOPICAL EVERY 12 HOURS
Refills: 0 | Status: DISCONTINUED | OUTPATIENT
Start: 2024-06-05 | End: 2024-06-05

## 2024-06-05 RX ORDER — HYDROMORPHONE HYDROCHLORIDE 2 MG/ML
0.5 INJECTION INTRAMUSCULAR; INTRAVENOUS; SUBCUTANEOUS EVERY 6 HOURS
Refills: 0 | Status: DISCONTINUED | OUTPATIENT
Start: 2024-06-05 | End: 2024-06-06

## 2024-06-05 RX ORDER — AMIODARONE HYDROCHLORIDE 400 MG/1
400 TABLET ORAL
Refills: 0 | Status: DISCONTINUED | OUTPATIENT
Start: 2024-06-05 | End: 2024-06-05

## 2024-06-05 RX ORDER — INSULIN HUMAN 100 [IU]/ML
3 INJECTION, SOLUTION SUBCUTANEOUS
Qty: 100 | Refills: 0 | Status: DISCONTINUED | OUTPATIENT
Start: 2024-06-05 | End: 2024-06-07

## 2024-06-05 RX ORDER — GABAPENTIN 400 MG/1
200 CAPSULE ORAL ONCE
Refills: 0 | Status: COMPLETED | OUTPATIENT
Start: 2024-06-05 | End: 2024-06-05

## 2024-06-05 RX ORDER — METOCLOPRAMIDE HCL 10 MG
10 TABLET ORAL EVERY 8 HOURS
Refills: 0 | Status: COMPLETED | OUTPATIENT
Start: 2024-06-05 | End: 2024-06-07

## 2024-06-05 RX ORDER — CEFUROXIME AXETIL 250 MG
1500 TABLET ORAL EVERY 8 HOURS
Refills: 0 | Status: COMPLETED | OUTPATIENT
Start: 2024-06-05 | End: 2024-06-07

## 2024-06-05 RX ORDER — SODIUM CHLORIDE 9 MG/ML
1000 INJECTION INTRAMUSCULAR; INTRAVENOUS; SUBCUTANEOUS
Refills: 0 | Status: DISCONTINUED | OUTPATIENT
Start: 2024-06-05 | End: 2024-06-09

## 2024-06-05 RX ORDER — ACETAMINOPHEN 500 MG
650 TABLET ORAL EVERY 6 HOURS
Refills: 0 | Status: COMPLETED | OUTPATIENT
Start: 2024-06-05 | End: 2024-06-08

## 2024-06-05 RX ORDER — OMEPRAZOLE 10 MG/1
1 CAPSULE, DELAYED RELEASE ORAL
Refills: 0 | DISCHARGE

## 2024-06-05 RX ORDER — ASPIRIN/CALCIUM CARB/MAGNESIUM 324 MG
1 TABLET ORAL
Refills: 0 | DISCHARGE

## 2024-06-05 RX ORDER — SENNA PLUS 8.6 MG/1
2 TABLET ORAL AT BEDTIME
Refills: 0 | Status: DISCONTINUED | OUTPATIENT
Start: 2024-06-06 | End: 2024-06-12

## 2024-06-05 RX ORDER — POTASSIUM CHLORIDE 20 MEQ
10 PACKET (EA) ORAL
Refills: 0 | Status: COMPLETED | OUTPATIENT
Start: 2024-06-05 | End: 2024-06-06

## 2024-06-05 RX ORDER — ACETAMINOPHEN 500 MG
650 TABLET ORAL EVERY 6 HOURS
Refills: 0 | Status: DISCONTINUED | OUTPATIENT
Start: 2024-06-08 | End: 2024-06-12

## 2024-06-05 RX ORDER — CHLORHEXIDINE GLUCONATE 213 G/1000ML
1 SOLUTION TOPICAL ONCE
Refills: 0 | Status: DISCONTINUED | OUTPATIENT
Start: 2024-06-05 | End: 2024-06-05

## 2024-06-05 RX ORDER — GABAPENTIN 400 MG/1
100 CAPSULE ORAL EVERY 8 HOURS
Refills: 0 | Status: DISCONTINUED | OUTPATIENT
Start: 2024-06-05 | End: 2024-06-08

## 2024-06-05 RX ORDER — POLYETHYLENE GLYCOL 3350 17 G/17G
17 POWDER, FOR SOLUTION ORAL DAILY
Refills: 0 | Status: DISCONTINUED | OUTPATIENT
Start: 2024-06-06 | End: 2024-06-12

## 2024-06-05 RX ORDER — ACETAMINOPHEN 500 MG
725 TABLET ORAL ONCE
Refills: 0 | Status: COMPLETED | OUTPATIENT
Start: 2024-06-05 | End: 2024-06-05

## 2024-06-05 RX ORDER — LIDOCAINE HCL 20 MG/ML
0.2 VIAL (ML) INJECTION ONCE
Refills: 0 | Status: DISCONTINUED | OUTPATIENT
Start: 2024-06-05 | End: 2024-06-05

## 2024-06-05 RX ORDER — OXYCODONE HYDROCHLORIDE 5 MG/1
5 TABLET ORAL EVERY 4 HOURS
Refills: 0 | Status: DISCONTINUED | OUTPATIENT
Start: 2024-06-05 | End: 2024-06-10

## 2024-06-05 RX ORDER — DEXMEDETOMIDINE HYDROCHLORIDE IN 0.9% SODIUM CHLORIDE 4 UG/ML
0.4 INJECTION INTRAVENOUS
Qty: 200 | Refills: 0 | Status: DISCONTINUED | OUTPATIENT
Start: 2024-06-05 | End: 2024-06-05

## 2024-06-05 RX ORDER — ACETAMINOPHEN 500 MG
975 TABLET ORAL ONCE
Refills: 0 | Status: COMPLETED | OUTPATIENT
Start: 2024-06-05 | End: 2024-06-05

## 2024-06-05 RX ORDER — ALBUMIN HUMAN 25 %
250 VIAL (ML) INTRAVENOUS ONCE
Refills: 0 | Status: COMPLETED | OUTPATIENT
Start: 2024-06-05 | End: 2024-06-05

## 2024-06-05 RX ADMIN — DEXMEDETOMIDINE HYDROCHLORIDE IN 0.9% SODIUM CHLORIDE 4.83 MICROGRAM(S)/KG/HR: 4 INJECTION INTRAVENOUS at 18:36

## 2024-06-05 RX ADMIN — Medication 125 MILLILITER(S): at 22:21

## 2024-06-05 RX ADMIN — Medication 50 MILLIEQUIVALENT(S): at 17:05

## 2024-06-05 RX ADMIN — Medication 100 MILLIEQUIVALENT(S): at 23:00

## 2024-06-05 RX ADMIN — GABAPENTIN 200 MILLIGRAM(S): 400 CAPSULE ORAL at 10:31

## 2024-06-05 RX ADMIN — Medication 10 MILLIGRAM(S): at 21:05

## 2024-06-05 RX ADMIN — Medication 975 MILLIGRAM(S): at 10:31

## 2024-06-05 RX ADMIN — Medication 100 MILLIEQUIVALENT(S): at 21:05

## 2024-06-05 RX ADMIN — Medication 100 MILLIEQUIVALENT(S): at 22:20

## 2024-06-05 RX ADMIN — NICARDIPINE HYDROCHLORIDE 25 MG/HR: 30 CAPSULE, EXTENDED RELEASE ORAL at 18:35

## 2024-06-05 RX ADMIN — Medication 100 MILLIEQUIVALENT(S): at 19:14

## 2024-06-05 RX ADMIN — Medication 290 MILLIGRAM(S): at 21:12

## 2024-06-05 RX ADMIN — Medication 3.62 MICROGRAM(S)/KG/MIN: at 18:35

## 2024-06-05 RX ADMIN — SODIUM CHLORIDE 1000 MILLILITER(S): 9 INJECTION, SOLUTION INTRAVENOUS at 18:36

## 2024-06-05 RX ADMIN — Medication 650 MILLIGRAM(S): at 23:35

## 2024-06-05 RX ADMIN — Medication 100 MILLIEQUIVALENT(S): at 19:45

## 2024-06-05 RX ADMIN — Medication 50 MILLIEQUIVALENT(S): at 18:35

## 2024-06-05 RX ADMIN — Medication 50 MILLIEQUIVALENT(S): at 17:35

## 2024-06-05 RX ADMIN — Medication 725 MILLIGRAM(S): at 21:27

## 2024-06-05 RX ADMIN — CHLORHEXIDINE GLUCONATE 15 MILLILITER(S): 213 SOLUTION TOPICAL at 19:04

## 2024-06-05 RX ADMIN — Medication 100 MILLIGRAM(S): at 21:04

## 2024-06-05 NOTE — PROGRESS NOTE ADULT - SUBJECTIVE AND OBJECTIVE BOX
ELBERT FRIEDMAN  MRN-[id number]  Patient is a 74y old  Female who presents with a chief complaint of open heart surgery (28 May 2024 11:20)    HPI:  This is a 74 y.o female with PMHx of HTN, HLD, aortic regurgitation, SVT, non-obstructive CAD, uterine cancer s/p hysterectomy prediabetes, gastritis, and 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 Pt is scheduled for aortic valve replacement with Dr. Sandeep Jj on 6/3/24. Per pt, she was hospitalized in November due to syncope from vomiting. During her hospitalization she had an echocardiogram that showed severe aortic regurgitation Pt reports palpitations lasting less than a minute before spontaneously resolving, denies triggers and left sided chest pressure. Pt reports plans for cardiac catheterization at [facility] on 5/31/24. After examiation with Dr. Jj, the above surgery was recommended.      (28 May 2024 11:20)      Surgery/Hospital course:  Pt reports plans for cardiac catheterization at [facility] on 5/31/24. After examiation with Dr. Jj, the above surgery was recommended.  Aortic Insufficiency s/p AVR 6/5/24     REVIEW OF SYSTEMS:  Unable to obtain due to patient being intubated    Vital Signs Last 24 Hrs  T(C): 36.7 (05 Jun 2024 09:26), Max: 36.7 (05 Jun 2024 09:26)  T(F): 98.1 (05 Jun 2024 09:26), Max: 98.1 (05 Jun 2024 09:26)  HR: 72 (05 Jun 2024 09:26) (72 - 72)  BP: 181/70 (05 Jun 2024 09:26) (181/70 - 181/70)  BP(mean): --  RR: 18 (05 Jun 2024 09:26) (18 - 18)  SpO2: 98% (05 Jun 2024 09:26) (98% - 98%)  ============================I/O===========================   I&O's Detail    ============================ LABS =========================                        10.1   6.66  )-----------( x        ( 05 Jun 2024 16:59 )             29.1         ABG - ( 05 Jun 2024 16:58 )  pH, Arterial: 7.46  pH, Blood: x     /  pCO2: 34    /  pO2: 308   / HCO3: 24    / Base Excess: 0.6   /  SaO2: 99.9    ======================Microbiology/Radiology=================  Culture: Reviewed   CXR: Reviewed  ======================================================  PAST MEDICAL & SURGICAL HISTORY:  SCC (Squamous Cell Carcinoma) (ICD9 173.9)  Head and neck 2009 treated with Chem/Radiation/ LN removal      Chronic Gastritis (ICD9 535.10)      Uterine Cancer (ICD9 179)  no chemo no radiation      HTN - Hypertension      Dyslipidemia (ICD9 272.4)      H/O: Osteoarthritis (ICD9 V13.4)      Chest Pain (ICD9 786.50)      CAD (coronary artery disease)      History of PSVT (paroxysmal supraventricular tachycardia)      Incisional hernia      Kidney stone      Prediabetes      History of Cholecystectomy (ICD9 V45.79)  2007      History of Tonsillectomy (ICD9 V45.89)      H/O: Hysterectomy (ICD9 V88.01)  at age 32      Lymph Node Cancer (ICD9 196.9)  removal of cervical LN due to head & neck squamous cell carcinoma ; 2009      S/P cardiac cath  2009  ====================ASSESSMENT ==============  74 year old female with PMH of SVT, nonobstructive CAD, HTN, HLD, 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 Addressed analgesic regimen to optimize function and sedated for ventilatory synchrony.     ==================== RESPIRATORY======================  Respiratory status required full ventilatory support, close monitoring of respiratory rate and breathing pattern, the following of ABG’s with A-line monitoring, continuous pulse oximetry monitoring.   Encourage incentive spirometry, continue pulse ox monitoring, follow ABGs.    ====================CARDIOVASCULAR==================  Patient admitted for Aortic Insufficiency s/p AVR 6/5/24. IV Levophed infusion for hypovolemic/vasogenic shock. Lactate elevated to 2.4, continue trending to monitor for fluid resuscitation as indicated. Invasive hemodynamic monitoring with a central venous catheter & an A-line were required for the continuous central venous and MAP/BP monitoring to ensure adequate cardiovascular support. Temporary back up pacing wires in place.     ===================HEMATOLOGIC/ONC ===================  Post-op acute blood loss anemia, no thrombocytopenia. Monitor H&H/Plts. Received 2 prbc, 2 plts, 1 cyro in the Or.     ===================== RENAL =========================  Optimize renal perfusion with adequate volume resuscitation and continued monitoring of urine output, fluid balance, electrolytes, and BUN/Creatinine. Continue monitoring urine output, I&Os, Bun/Cr    ==================== GASTROINTESTINAL===================  NPO for now. Protonix for stress ulcer prophylaxis. Continue bowel regimen.    =======================    ENDOCRINE  =====================  Metabolic stability, stress hyperglycemia required an IV regular Insulin drip while following serial glucose levels to help achieve and maintain euglycemia.      ========================INFECTIOUS DISEASE================  Perioperative coverage with Cefuroxime.     Patient requires continuous monitoring with bedside rhythm monitoring, pulse ox monitoring, and intermittent blood gas analysis. Care plan discussed with ICU care team. Patient remained critical and at risk for life threatening decompensation.    By signing my name below, I, Cat Vinson, attest that this documentation has been prepared under the direction and in the presence of Luis Quiñones MD   Electronically signed: Juan M Roblero      I have spent 40 minutes providing critical care management to this patient.    I, Luis Quiñones, personally performed the services described in this documentation. all medical record entries made by the scribe were at my direction and in my presence. 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 Pt is scheduled for aortic valve replacement with Dr. Sandeep Jj on 6/3/24. Per pt, she was hospitalized in November due to syncope from vomiting. During her hospitalization she had an echocardiogram that showed severe aortic regurgitation Pt reports palpitations lasting less than a minute before spontaneously resolving, denies triggers and left sided chest pressure. Pt reports plans for cardiac catheterization at [facility] on 5/31/24. After examiation with Dr. Jj, the above surgery was recommended.      (28 May 2024 11:20)      Surgery/Hospital course:  Pt reports plans for cardiac catheterization at [facility] on 5/31/24. After examiation with Dr. Jj, the above surgery was recommended.  Aortic Insufficiency s/p AVR 6/5/24     REVIEW OF SYSTEMS:  Unable to obtain due to patient being intubated    Vital Signs Last 24 Hrs  T(C): 36.7 (05 Jun 2024 09:26), Max: 36.7 (05 Jun 2024 09:26)  T(F): 98.1 (05 Jun 2024 09:26), Max: 98.1 (05 Jun 2024 09:26)  HR: 72 (05 Jun 2024 09:26) (72 - 72)  BP: 181/70 (05 Jun 2024 09:26) (181/70 - 181/70)  BP(mean): --  RR: 18 (05 Jun 2024 09:26) (18 - 18)  SpO2: 98% (05 Jun 2024 09:26) (98% - 98%)  ============================I/O===========================   I&O's Detail    ============================ LABS =========================                        10.1   6.66  )-----------( x        ( 05 Jun 2024 16:59 )             29.1         ABG - ( 05 Jun 2024 16:58 )  pH, Arterial: 7.46  pH, Blood: x     /  pCO2: 34    /  pO2: 308   / HCO3: 24    / Base Excess: 0.6   /  SaO2: 99.9    ======================Microbiology/Radiology=================  Culture: Reviewed   CXR: Reviewed  ======================================================  PAST MEDICAL & SURGICAL HISTORY:  SCC (Squamous Cell Carcinoma) (ICD9 173.9)  Head and neck 2009 treated with Chem/Radiation/ LN removal      Chronic Gastritis (ICD9 535.10)      Uterine Cancer (ICD9 179)  no chemo no radiation      HTN - Hypertension      Dyslipidemia (ICD9 272.4)      H/O: Osteoarthritis (ICD9 V13.4)      Chest Pain (ICD9 786.50)      CAD (coronary artery disease)      History of PSVT (paroxysmal supraventricular tachycardia)      Incisional hernia      Kidney stone      Prediabetes      History of Cholecystectomy (ICD9 V45.79)  2007      History of Tonsillectomy (ICD9 V45.89)      H/O: Hysterectomy (ICD9 V88.01)  at age 32      Lymph Node Cancer (ICD9 196.9)  removal of cervical LN due to head & neck squamous cell carcinoma ; 2009      S/P cardiac cath  2009  ====================ASSESSMENT ==============  74 year old female with PMH of SVT, nonobstructive CAD, HTN, HLD, 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 Addressed analgesic regimen to optimize function and sedated for ventilatory synchrony.     ==================== RESPIRATORY======================  Respiratory status required full ventilatory support, close monitoring of respiratory rate and breathing pattern, the following of ABG’s with A-line monitoring, continuous pulse oximetry monitoring.   Encourage incentive spirometry, continue pulse ox monitoring, follow ABGs.    ====================CARDIOVASCULAR==================  Patient admitted for Aortic Insufficiency s/p AVR 6/5/24. IV Levophed infusion for hypovolemic/vasogenic shock. Lactate elevated to 2.4, continue trending to monitor for fluid resuscitation as indicated. Invasive hemodynamic monitoring with a central venous catheter & an A-line were required for the continuous central venous and MAP/BP monitoring to ensure adequate cardiovascular support. Temporary back up pacing wires in place.     ===================HEMATOLOGIC/ONC ===================  Post-op acute blood loss anemia, no thrombocytopenia. Monitor H&H/Plts. Received 2 prbc, 2 plts, 1 cyro in the Or.     ===================== RENAL =========================  Optimize renal perfusion with adequate volume resuscitation and continued monitoring of urine output, fluid balance, electrolytes, and BUN/Creatinine. Continue monitoring urine output, I&Os, Bun/Cr    ==================== GASTROINTESTINAL===================  NPO for now. Protonix for stress ulcer prophylaxis. Continue bowel regimen.    =======================    ENDOCRINE  =====================  Metabolic stability, stress hyperglycemia required an IV regular Insulin drip while following serial glucose levels to help achieve and maintain euglycemia.      ========================INFECTIOUS DISEASE================  Perioperative coverage with Cefuroxime.     Patient requires continuous monitoring with bedside rhythm monitoring, pulse ox monitoring, and intermittent blood gas analysis. Care plan discussed with ICU care team. Patient remained critical and at risk for life threatening decompensation.    By signing my name below, I, Cat Vinson, attest that this documentation has been prepared under the direction and in the presence of Luis Quiñones MD   Electronically signed: Juan M Roblero, Luis Quiñones, personally performed the services described in this documentation. all medical record entries made by the scribe were at my direction and in my presence. I have reviewed the chart and agree that the record reflects my personal performance and is accurate and complete  Electronically signed: Luis Quiñones MD 06-05-24 @ 17:15

## 2024-06-05 NOTE — PRE-ANESTHESIA EVALUATION ADULT - NSANTHAIRWAYFT_ENT_ALL_CORE
OK mouth opening, 3 FB TM distance, full neck ROM;  good trachea movement., good mandibular protrusion

## 2024-06-05 NOTE — BRIEF OPERATIVE NOTE - COMMENTS
*** estimated blood loss not applicable due to use of cardiotomy and cell saver suction ***   No unexpected foreign bodies were apparent on preliminary review of post-op x-ray. Reviewed by Dr. Jj

## 2024-06-05 NOTE — PRE-ANESTHESIA EVALUATION ADULT - NSANTHPMHFT_GEN_ALL_CORE
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 Pt is scheduled for aortic valve replacement with Dr. Sandeep Jj on 6/3/24. Per pt, she was hospitalized in November due to syncope from vomiting. 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 Pt is scheduled for aortic valve replacement with Dr. Sandeep Jj on 6/3/24. Per pt, she was hospitalized in November due to syncope from vomiting. During her hospitalization she had an echocardiogram that showed severe aortic regurgitation    +CLAIRE; denies dysphagia, N/V.  hx of chemo and RT to neck and chest.

## 2024-06-05 NOTE — PRE-ANESTHESIA EVALUATION ADULT - NSRADCARDRESULTSFT_GEN_ALL_CORE
< from: Stress TTE Exercise W or WO Ultrasound Enhancing Agent (04.29.24 @ 14:20) >    CONCLUSIONS     1. Abnormal exercise stress echocardiogram.   2. Patient achieved 4.6 METs, which is consistent with poor exercise capacity.   3. Baseline/resting valve assessment showed moderate to severe aortic regurgitation.   4. At stress, valve assessment revealed severe aortic regurgitation.   5. The heart rate response was exaggerated.   6. Hypertensive blood pressure response to exercise.   7. No echocardiographic evidence of exercise induced ischemia.   8. Basseline Left ventricular systolic functionis normal.   9. Normal right ventricular cavity size, with normal wall thickness, and normal systolic function.  10. Moderate to severe aortic regurgitation.  11. Findings were discussed with Dr. Lyle on 4/29/2024 at 3:29pm. No prior echocardiogram is available for comparison.    < end of copied text >    < from: Cardiac Catheterization (05.31.24 @ 08:14) >      Diagnostic Conclusions:     Three vessel non-obstructive coronary artery disease   Normal left main coronary artery   Right dominant system   Normal right atrial pressure (mRA 2mmHg with a V wave of 4mmHg)   Normal pulmonary pressures (sPAP 26mmHg, dPAP 8mmHg, mPAP 16mmHg)   PCWP = 4mmHg    LVEDP = 8mmHg   Severe aortic insuffiency       < end of copied text >

## 2024-06-05 NOTE — AIRWAY REMOVAL NOTE  ADULT & PEDS - ARTIFICAL AIRWAY REMOVAL COMMENTS
Written order for extubation verified. The patient was identified by full name and birth date compared to the identification band. Present during the procedure was VERA Galindo.

## 2024-06-05 NOTE — PATIENT PROFILE ADULT - FOOD INSECURITY
OFFICE VISIT      Patient: Luis Daniel Casanova Date of Service: 2018   : 1982 MRN: [id number]     SUBJECTIVE:   HISTORY OF PRESENT ILLNESS:  Luis Daniel Casanova is a 36 year old male who presents today for COUGH CONGESTION EAR PAIN 1 WEEK  WORSE  OTC PRN    Chief Complaint   Patient presents with   • Ear Problem     L ear plugged, painful, keeps him up at night   • Cough     chest congestion, thick mucus coming up for x3 days, feels slow and no energy. hard to breath   • Headache     pounding headache       HPI    PAST MEDICAL HISTORY:  History reviewed. No pertinent past medical history.    MEDICATIONS:  Current Outpatient Medications   Medication Sig   • levofloxacin (LEVAQUIN) 500 MG tablet Take 1 tablet by mouth daily for 10 days.   • promethazine-dextromethorphan (PROMETHAZINE-DM) 6.25-15 MG/5ML syrup Take 5 mLs by mouth 4 times daily as needed for Cough.     No current facility-administered medications for this visit.        ALLERGIES:  ALLERGIES:  No Known Allergies    PAST SURGICAL HISTORY:  History reviewed. No pertinent surgical history.    FAMILY HISTORY:  History reviewed. No pertinent family history.    SOCIAL HISTORY:  Social History     Tobacco Use   • Smoking status: Never Smoker   • Smokeless tobacco: Never Used   Substance Use Topics   • Alcohol use: Not on file   • Drug use: Not on file       Review of Systems   Respiratory: Negative for hemoptysis and wheezing.    Cardiovascular: Negative.        OBJECTIVE:     Physical Exam   Constitutional: He is oriented to person, place, and time and well-developed, well-nourished, and in no distress.   HENT:   Head: Normocephalic and atraumatic.   Right Ear: External ear normal.   Left Ear: External ear normal.   Nose: Nose normal.   Mouth/Throat: Oropharynx is clear and moist.   TM INJECTED   Eyes: Conjunctivae and EOM are normal. Pupils are equal, round, and reactive to light.   Neck: Normal range of motion. Neck supple.   Cardiovascular: Normal  rate, regular rhythm, normal heart sounds and intact distal pulses.   Pulmonary/Chest: Effort normal and breath sounds normal.   CONGESTED   Abdominal: Soft. Bowel sounds are normal.   Musculoskeletal: Normal range of motion.   Neurological: He is alert and oriented to person, place, and time. Gait normal.   Skin: Skin is warm and dry.   Psychiatric: Mood, affect and judgment normal.       Visit Vitals  /88   Pulse 79   Temp 97.4 °F (36.3 °C) (Tympanic)   Resp 20   Ht 6' (1.829 m)   Wt (!) 152.9 kg (337 lb 1.3 oz)   BMI 45.72 kg/m²       DIAGNOSTIC STUDIES:   LAB RESULTS:  No visits with results within 1 Month(s) from this visit.   Latest known visit with results is:   Lab Services on 11/22/2014   Component Date Value Ref Range Status   • WHITE BLOOD COUNT 11/22/2014 7.5  4.2 - 11.0 K/mcL Final   • RED CELL COUNT 11/22/2014 4.96  4.50 - 5.90 mil/mcL Final   • HEMOGLOBIN 11/22/2014 14.4  13.0 - 17.0 g/dl Final   • HEMATOCRIT 11/22/2014 44.1  39.0 - 51.0 % Final   • MEAN CORPUSCULAR VOLUME 11/22/2014 88.9  78.0 - 100.0 fL Final   • MEAN CORPUSCULAR HEMOGLOBIN 11/22/2014 29  26.0 - 34.0 pg Final   • MEAN CORPUSCULAR HGB CONC 11/22/2014 32.7  32.0 - 36.5 g/dl Final   • RDW-CV 11/22/2014 13.6  11.0 - 15.0 % Final   • PLATELET COUNT 11/22/2014 208  140 - 450 K/mcL Final   • Neutrophil 11/22/2014 40  % Final   • LYMPH 11/22/2014 49  % Final   • MONO 11/22/2014 9  % Final   • EOSIN 11/22/2014 2  % Final   • BASO 11/22/2014 0  % Final   • Absolute Neutrophil 11/22/2014 3  1.8 - 7.7 K/mcL Final   • Absolute Lymph 11/22/2014 3.7  1.0 - 4.8 K/mcL Final   • Absolute Mono 11/22/2014 0.7  0.3 - 0.9 K/mcL Final   • Absolute Eos 11/22/2014 0.2  0.1 - 0.5 K/mcL Final   • Absolute Baso 11/22/2014 0  0.0 - 0.3 K/mcL Final   • Sodium 11/22/2014 140  135 - 145 mmol/L Final   • Potassium 11/22/2014 4.5  3.4 - 5.1 mmol/L Final   • Chloride 11/22/2014 106  98 - 107 mmol/L Final   • Carbon Dioxide 11/22/2014 26  21 - 32 mmol/L Final    • Anion Gap 11/22/2014 13  10 - 20 mmol/L Final   • Glucose 11/22/2014 131* 65 - 99 mg/dl Final   • BUN 11/22/2014 15  10 - 20 mg/dl Final   • Creatinine 11/22/2014 0.90  0.50 - 1.30 mg/dl Final   • GFR Estimate,  11/22/2014 >60  >59   Final   • GFR Estimate, Non  11/22/2014 >60  >59   Final   • BUN/Creatinine Ratio 11/22/2014 17  7 - 25   Final   • TOTAL BILIRUBIN 11/22/2014 0.2  0.2 - 1.0 mg/dl Final   • AST/SGOT 11/22/2014 26  <38 Units/L Final   • ALK PHOSPHATASE 11/22/2014 127* 45 - 117 Units/L Final   • Albumin 11/22/2014 3.8  3.4 - 5.0 g/dl Final   • TOTAL PROTEIN 11/22/2014 7.0  6.4 - 8.2 g/dl Final   • GLOBULIN 11/22/2014 3.2  2.0 - 4.0 g/dl Final   • A/G Ratio, Serum 11/22/2014 1.2  1.0 - 2.4   Final   • CALCIUM 11/22/2014 9.2  8.4 - 10.2 mg/dl Final   • ALT/SGPT 11/22/2014 57  <79 Units/L Final   • FASTING STATUS 11/22/2014 UNKNOWN  hrs Final   • CHOLESTEROL 11/22/2014 193  100 - 200 mg/dl Final   • HDL 11/22/2014 22* >39 mg/dl Final   • TRIGLYCERIDE 11/22/2014 597* <150 mg/dl Final   • CALCULATED LDL 11/22/2014 UNABLE TO CALCULATE LDL.  <130 mg/dl Final   • CALCULATED NON HDL 11/22/2014 171  mg/dl Final   • CHOL/HDL 11/22/2014 8.8* <4.5   Final   • TSH 11/22/2014 1.208  0.350 - 5.000 mcUnits/mL Final   • DIFF TYPE 11/22/2014 AUTOMATED DIFFERENTIAL    Final   • Fasting Status 11/22/2014 UNKNOWN  hrs Final   • Culture Strep Grp A 10/29/2014     Final                    Value:SPECIMEN DESCRIPTION (SDES):  THROAT  CULTURE (CULT):               NO BETA HEMOLYTIC STREPTOCOCCI ISOLATED  REPORT STATUS (RPT):          FINAL 11/01/2014         Assessment AND PLAN:   This is a 36 year old year-old male who presents with       Luis Daniel was seen today for ear problem, cough and headache.    Diagnoses and all orders for this visit:    Bronchitis  -     levofloxacin (LEVAQUIN) 500 MG tablet; Take 1 tablet by mouth daily for 10 days.  -     promethazine-dextromethorphan (PROMETHAZINE-DM)  6.25-15 MG/5ML syrup; Take 5 mLs by mouth 4 times daily as needed for Cough.          Please take medications as directed.      Instructions provided as documented in the AVS.    Return if symptoms worsen or fail to improve.      OTC PRN    The patient indicated understanding of the diagnosis and agreed with the plan of care.         no

## 2024-06-06 LAB
ALBUMIN SERPL ELPH-MCNC: 4.3 G/DL — SIGNIFICANT CHANGE UP (ref 3.3–5)
ALP SERPL-CCNC: 66 U/L — SIGNIFICANT CHANGE UP (ref 40–120)
ALT FLD-CCNC: 38 U/L — SIGNIFICANT CHANGE UP (ref 10–45)
ANION GAP SERPL CALC-SCNC: 13 MMOL/L — SIGNIFICANT CHANGE UP (ref 5–17)
APTT BLD: 30 SEC — SIGNIFICANT CHANGE UP (ref 24.5–35.6)
AST SERPL-CCNC: 80 U/L — HIGH (ref 10–40)
BASE EXCESS BLDV CALC-SCNC: -2.1 MMOL/L — LOW (ref -2–3)
BASOPHILS # BLD AUTO: 0.03 K/UL — SIGNIFICANT CHANGE UP (ref 0–0.2)
BASOPHILS NFR BLD AUTO: 0.4 % — SIGNIFICANT CHANGE UP (ref 0–2)
BILIRUB SERPL-MCNC: 1 MG/DL — SIGNIFICANT CHANGE UP (ref 0.2–1.2)
BUN SERPL-MCNC: 11 MG/DL — SIGNIFICANT CHANGE UP (ref 7–23)
CALCIUM SERPL-MCNC: 8.5 MG/DL — SIGNIFICANT CHANGE UP (ref 8.4–10.5)
CHLORIDE SERPL-SCNC: 106 MMOL/L — SIGNIFICANT CHANGE UP (ref 96–108)
CO2 BLDV-SCNC: 26 MMOL/L — SIGNIFICANT CHANGE UP (ref 22–26)
CO2 SERPL-SCNC: 20 MMOL/L — LOW (ref 22–31)
CREAT SERPL-MCNC: 0.49 MG/DL — LOW (ref 0.5–1.3)
EGFR: 99 ML/MIN/1.73M2 — SIGNIFICANT CHANGE UP
EOSINOPHIL # BLD AUTO: 0 K/UL — SIGNIFICANT CHANGE UP (ref 0–0.5)
EOSINOPHIL NFR BLD AUTO: 0 % — SIGNIFICANT CHANGE UP (ref 0–6)
GAS PNL BLDA: SIGNIFICANT CHANGE UP
GLUCOSE SERPL-MCNC: 158 MG/DL — HIGH (ref 70–99)
HCO3 BLDV-SCNC: 24 MMOL/L — SIGNIFICANT CHANGE UP (ref 22–29)
HCT VFR BLD CALC: 31 % — LOW (ref 34.5–45)
HGB BLD-MCNC: 10.5 G/DL — LOW (ref 11.5–15.5)
HOROWITZ INDEX BLDV+IHG-RTO: 44 — SIGNIFICANT CHANGE UP
IMM GRANULOCYTES NFR BLD AUTO: 0.3 % — SIGNIFICANT CHANGE UP (ref 0–0.9)
INR BLD: 1.16 RATIO — SIGNIFICANT CHANGE UP (ref 0.85–1.18)
LYMPHOCYTES # BLD AUTO: 0.29 K/UL — LOW (ref 1–3.3)
LYMPHOCYTES # BLD AUTO: 4.1 % — LOW (ref 13–44)
MAGNESIUM SERPL-MCNC: 2.4 MG/DL — SIGNIFICANT CHANGE UP (ref 1.6–2.6)
MCHC RBC-ENTMCNC: 28.3 PG — SIGNIFICANT CHANGE UP (ref 27–34)
MCHC RBC-ENTMCNC: 33.9 GM/DL — SIGNIFICANT CHANGE UP (ref 32–36)
MCV RBC AUTO: 83.6 FL — SIGNIFICANT CHANGE UP (ref 80–100)
MONOCYTES # BLD AUTO: 0.47 K/UL — SIGNIFICANT CHANGE UP (ref 0–0.9)
MONOCYTES NFR BLD AUTO: 6.7 % — SIGNIFICANT CHANGE UP (ref 2–14)
NEUTROPHILS # BLD AUTO: 6.19 K/UL — SIGNIFICANT CHANGE UP (ref 1.8–7.4)
NEUTROPHILS NFR BLD AUTO: 88.5 % — HIGH (ref 43–77)
NRBC # BLD: 0 /100 WBCS — SIGNIFICANT CHANGE UP (ref 0–0)
PCO2 BLDV: 47 MMHG — HIGH (ref 39–42)
PH BLDV: 7.32 — SIGNIFICANT CHANGE UP (ref 7.32–7.43)
PHOSPHATE SERPL-MCNC: 3 MG/DL — SIGNIFICANT CHANGE UP (ref 2.5–4.5)
PLATELET # BLD AUTO: 169 K/UL — SIGNIFICANT CHANGE UP (ref 150–400)
PO2 BLDV: 52 MMHG — HIGH (ref 25–45)
POTASSIUM SERPL-MCNC: 4.5 MMOL/L — SIGNIFICANT CHANGE UP (ref 3.5–5.3)
POTASSIUM SERPL-SCNC: 4.5 MMOL/L — SIGNIFICANT CHANGE UP (ref 3.5–5.3)
PROT SERPL-MCNC: 6.5 G/DL — SIGNIFICANT CHANGE UP (ref 6–8.3)
PROTHROM AB SERPL-ACNC: 12.7 SEC — SIGNIFICANT CHANGE UP (ref 9.5–13)
RBC # BLD: 3.71 M/UL — LOW (ref 3.8–5.2)
RBC # FLD: 15 % — HIGH (ref 10.3–14.5)
SAO2 % BLDV: 83 % — SIGNIFICANT CHANGE UP (ref 67–88)
SODIUM SERPL-SCNC: 139 MMOL/L — SIGNIFICANT CHANGE UP (ref 135–145)
WBC # BLD: 7 K/UL — SIGNIFICANT CHANGE UP (ref 3.8–10.5)
WBC # FLD AUTO: 7 K/UL — SIGNIFICANT CHANGE UP (ref 3.8–10.5)

## 2024-06-06 PROCEDURE — 71045 X-RAY EXAM CHEST 1 VIEW: CPT | Mod: 26

## 2024-06-06 PROCEDURE — 99291 CRITICAL CARE FIRST HOUR: CPT

## 2024-06-06 RX ORDER — FUROSEMIDE 40 MG
20 TABLET ORAL DAILY
Refills: 0 | Status: DISCONTINUED | OUTPATIENT
Start: 2024-06-06 | End: 2024-06-08

## 2024-06-06 RX ORDER — ATORVASTATIN CALCIUM 80 MG/1
40 TABLET, FILM COATED ORAL AT BEDTIME
Refills: 0 | Status: DISCONTINUED | OUTPATIENT
Start: 2024-06-06 | End: 2024-06-11

## 2024-06-06 RX ORDER — INSULIN LISPRO 100/ML
VIAL (ML) SUBCUTANEOUS
Refills: 0 | Status: DISCONTINUED | OUTPATIENT
Start: 2024-06-06 | End: 2024-06-08

## 2024-06-06 RX ORDER — METOPROLOL TARTRATE 50 MG
25 TABLET ORAL ONCE
Refills: 0 | Status: COMPLETED | OUTPATIENT
Start: 2024-06-06 | End: 2024-06-06

## 2024-06-06 RX ORDER — PANTOPRAZOLE SODIUM 20 MG/1
40 TABLET, DELAYED RELEASE ORAL DAILY
Refills: 0 | Status: DISCONTINUED | OUTPATIENT
Start: 2024-06-06 | End: 2024-06-10

## 2024-06-06 RX ORDER — POTASSIUM CHLORIDE 20 MEQ
20 PACKET (EA) ORAL ONCE
Refills: 0 | Status: COMPLETED | OUTPATIENT
Start: 2024-06-06 | End: 2024-06-06

## 2024-06-06 RX ORDER — SPIRONOLACTONE 25 MG/1
25 TABLET, FILM COATED ORAL EVERY 12 HOURS
Refills: 0 | Status: DISCONTINUED | OUTPATIENT
Start: 2024-06-06 | End: 2024-06-08

## 2024-06-06 RX ORDER — METOPROLOL TARTRATE 50 MG
12.5 TABLET ORAL EVERY 12 HOURS
Refills: 0 | Status: DISCONTINUED | OUTPATIENT
Start: 2024-06-06 | End: 2024-06-06

## 2024-06-06 RX ORDER — METOPROLOL TARTRATE 50 MG
50 TABLET ORAL EVERY 8 HOURS
Refills: 0 | Status: DISCONTINUED | OUTPATIENT
Start: 2024-06-06 | End: 2024-06-07

## 2024-06-06 RX ORDER — ENOXAPARIN SODIUM 100 MG/ML
40 INJECTION SUBCUTANEOUS EVERY 24 HOURS
Refills: 0 | Status: DISCONTINUED | OUTPATIENT
Start: 2024-06-06 | End: 2024-06-12

## 2024-06-06 RX ORDER — METOPROLOL TARTRATE 50 MG
25 TABLET ORAL EVERY 8 HOURS
Refills: 0 | Status: DISCONTINUED | OUTPATIENT
Start: 2024-06-06 | End: 2024-06-06

## 2024-06-06 RX ORDER — ONDANSETRON 8 MG/1
4 TABLET, FILM COATED ORAL ONCE
Refills: 0 | Status: COMPLETED | OUTPATIENT
Start: 2024-06-06 | End: 2024-06-06

## 2024-06-06 RX ORDER — ASPIRIN/CALCIUM CARB/MAGNESIUM 324 MG
81 TABLET ORAL DAILY
Refills: 0 | Status: DISCONTINUED | OUTPATIENT
Start: 2024-06-06 | End: 2024-06-12

## 2024-06-06 RX ADMIN — Medication 100 MILLIGRAM(S): at 21:06

## 2024-06-06 RX ADMIN — Medication 500 MILLIGRAM(S): at 05:05

## 2024-06-06 RX ADMIN — Medication 500 MILLIGRAM(S): at 17:01

## 2024-06-06 RX ADMIN — Medication 650 MILLIGRAM(S): at 00:05

## 2024-06-06 RX ADMIN — SODIUM CHLORIDE 10 MILLILITER(S): 9 INJECTION INTRAMUSCULAR; INTRAVENOUS; SUBCUTANEOUS at 07:34

## 2024-06-06 RX ADMIN — GABAPENTIN 100 MILLIGRAM(S): 400 CAPSULE ORAL at 21:07

## 2024-06-06 RX ADMIN — NICARDIPINE HYDROCHLORIDE 25 MG/HR: 30 CAPSULE, EXTENDED RELEASE ORAL at 07:34

## 2024-06-06 RX ADMIN — Medication 10 MILLIGRAM(S): at 05:04

## 2024-06-06 RX ADMIN — Medication 10 MILLIGRAM(S): at 21:06

## 2024-06-06 RX ADMIN — OXYCODONE HYDROCHLORIDE 5 MILLIGRAM(S): 5 TABLET ORAL at 13:30

## 2024-06-06 RX ADMIN — HYDROMORPHONE HYDROCHLORIDE 0.5 MILLIGRAM(S): 2 INJECTION INTRAMUSCULAR; INTRAVENOUS; SUBCUTANEOUS at 07:30

## 2024-06-06 RX ADMIN — OXYCODONE HYDROCHLORIDE 5 MILLIGRAM(S): 5 TABLET ORAL at 14:30

## 2024-06-06 RX ADMIN — Medication 20 MILLIEQUIVALENT(S): at 16:53

## 2024-06-06 RX ADMIN — HYDROMORPHONE HYDROCHLORIDE 0.5 MILLIGRAM(S): 2 INJECTION INTRAMUSCULAR; INTRAVENOUS; SUBCUTANEOUS at 07:45

## 2024-06-06 RX ADMIN — Medication 10 MILLIGRAM(S): at 13:26

## 2024-06-06 RX ADMIN — GABAPENTIN 100 MILLIGRAM(S): 400 CAPSULE ORAL at 05:05

## 2024-06-06 RX ADMIN — Medication 20 MILLIGRAM(S): at 11:58

## 2024-06-06 RX ADMIN — Medication 650 MILLIGRAM(S): at 11:35

## 2024-06-06 RX ADMIN — SENNA PLUS 2 TABLET(S): 8.6 TABLET ORAL at 21:07

## 2024-06-06 RX ADMIN — Medication 650 MILLIGRAM(S): at 05:05

## 2024-06-06 RX ADMIN — Medication 650 MILLIGRAM(S): at 05:35

## 2024-06-06 RX ADMIN — ONDANSETRON 4 MILLIGRAM(S): 8 TABLET, FILM COATED ORAL at 06:10

## 2024-06-06 RX ADMIN — Medication 81 MILLIGRAM(S): at 11:35

## 2024-06-06 RX ADMIN — Medication 25 MILLIGRAM(S): at 11:57

## 2024-06-06 RX ADMIN — SPIRONOLACTONE 25 MILLIGRAM(S): 25 TABLET, FILM COATED ORAL at 17:01

## 2024-06-06 RX ADMIN — Medication 12.5 MILLIGRAM(S): at 09:29

## 2024-06-06 RX ADMIN — Medication 650 MILLIGRAM(S): at 17:01

## 2024-06-06 RX ADMIN — GABAPENTIN 100 MILLIGRAM(S): 400 CAPSULE ORAL at 13:26

## 2024-06-06 RX ADMIN — PANTOPRAZOLE SODIUM 40 MILLIGRAM(S): 20 TABLET, DELAYED RELEASE ORAL at 11:35

## 2024-06-06 RX ADMIN — Medication 50 MILLIGRAM(S): at 21:06

## 2024-06-06 RX ADMIN — Medication 100 MILLIGRAM(S): at 05:05

## 2024-06-06 RX ADMIN — Medication 25 MILLIGRAM(S): at 16:58

## 2024-06-06 RX ADMIN — ENOXAPARIN SODIUM 40 MILLIGRAM(S): 100 INJECTION SUBCUTANEOUS at 11:35

## 2024-06-06 RX ADMIN — POLYETHYLENE GLYCOL 3350 17 GRAM(S): 17 POWDER, FOR SOLUTION ORAL at 11:35

## 2024-06-06 RX ADMIN — Medication 100 MILLIGRAM(S): at 13:26

## 2024-06-06 RX ADMIN — ATORVASTATIN CALCIUM 40 MILLIGRAM(S): 80 TABLET, FILM COATED ORAL at 21:07

## 2024-06-06 NOTE — PHYSICAL THERAPY INITIAL EVALUATION ADULT - GENERAL OBSERVATIONS, REHAB EVAL
pt found in chair +2L O2 +tele +pulse ox +a-line x2 (r radial & R femoral)  +external pacer +2 chest tubes +fowler

## 2024-06-06 NOTE — PHYSICAL THERAPY INITIAL EVALUATION ADULT - ADDITIONAL COMMENTS
pt lives with daughter in private home, 6-7 steps to enter. Prior to admission, pt was I with all functional mobility and ADLs without AD.

## 2024-06-06 NOTE — PHYSICAL THERAPY INITIAL EVALUATION ADULT - PERTINENT HX OF CURRENT PROBLEM, REHAB EVAL
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 Per pt, she was hospitalized in November due to syncope from vomiting. During her hospitalization she had an echocardiogram that showed severe aortic regurgitation Pt reports palpitations lasting less than a minute before spontaneously resolving, denies triggers and left sided chest pressure. Pt underwent cardiac catheterization at [facility] on 5/31/24, then recommended for aortic valve replacement. Now s/p AVR 6/5.

## 2024-06-06 NOTE — PHYSICAL THERAPY INITIAL EVALUATION ADULT - GAIT DEVIATIONS NOTED, PT EVAL
narrow JB/decreased balbir/increased time in double stance/decreased velocity of limb motion/decreased stride length/decreased weight-shifting ability

## 2024-06-06 NOTE — PROGRESS NOTE ADULT - SUBJECTIVE AND OBJECTIVE BOX
Patient seen and examined at the bedside.    Remains critically ill on continuous ICU monitoring.      Brief Summary:  75 yo F with Aortic Insufficiency s/p AVR 6/5/24       24 Hour events:      Objective:  Vital Signs Last 24 Hrs  T(C): 37.7 (06 Jun 2024 04:00), Max: 37.8 (05 Jun 2024 20:00)  T(F): 99.9 (06 Jun 2024 04:00), Max: 100 (05 Jun 2024 20:00)  HR: 75 (06 Jun 2024 06:45) (66 - 91)  BP: 181/70 (05 Jun 2024 09:26) (181/70 - 181/70)  BP(mean): --  RR: 15 (06 Jun 2024 06:45) (10 - 21)  SpO2: 98% (06 Jun 2024 06:45) (94% - 100%)    Parameters below as of 06 Jun 2024 04:00  Patient On (Oxygen Delivery Method): nasal cannula    O2 Concentration (%): 6    Mode: CPAP with PS  FiO2: 40  PEEP: 5  PS: 5  MAP: 5  PIP: 10              Physical Exam:   General: Alert and interactive   Neurology: Oriented, following commands  Respiratory: Clear bilaterally   CV: Sinus Rhythm  Abdominal: Soft, Nontender  Extremities: Warm, well-perfused  Erickson     -------------------------------------------------------------------------------------------------------------------------------    Labs:                        10.5   7.00  )-----------( 169      ( 06 Jun 2024 00:33 )             31.0     06-06    139  |  106  |  11  ----------------------------<  158<H>  4.5   |  20<L>  |  0.49<L>    Ca    8.5      06 Jun 2024 00:31  Phos  3.0     06-06  Mg     2.4     06-06    TPro  6.5  /  Alb  4.3  /  TBili  1.0  /  DBili  x   /  AST  80<H>  /  ALT  38  /  AlkPhos  66  06-06    LIVER FUNCTIONS - ( 06 Jun 2024 00:31 )  Alb: 4.3 g/dL / Pro: 6.5 g/dL / ALK PHOS: 66 U/L / ALT: 38 U/L / AST: 80 U/L / GGT: x           PT/INR - ( 06 Jun 2024 00:35 )   PT: 12.7 sec;   INR: 1.16 ratio         PTT - ( 06 Jun 2024 00:35 )  PTT:30.0 sec  ABG - ( 06 Jun 2024 04:57 )  pH, Arterial: 7.39  pH, Blood: x     /  pCO2: 40    /  pO2: 103   / HCO3: 24    / Base Excess: -0.7  /  SaO2: 99.3          ------------------------------------------------------------------------------------------------------------------------------  Assessment:  75 yo F with Aortic Insufficiency s/p AVR 6/5/24     Hemodynamic instability  Hypovolemia  Post op respiratory insufficiency  Acute blood loss anemia  Stress hyperglycemia      Plan:   ***Neuro***  Postoperative acute pain control with Tylenol, Gabapentin, and prns.    ***Cardiovascular***  At high risk for hemodynamic instability and cardiac arrhythmias.  Lopressor for rate control   Monitor chest tube output.      ***Pulmonary***  Postoperative acute respiratory insufficiency  Deep breathing and coughing exercises.  Wean oxygen as able.      ***GI***  Tolerating diet.   Protonix for stress ulcer prophylaxis   Bowel regimen.  Reglan for gut motility     ***Renal***  Trend Creatinine   Erickson catheter for strict I/O measurements.  Replete electrolytes as indicated.      ***ID***  Cefuroxime for perioperative antibiotic coverage       ***Endocrine***  Hyperglycemia- Insulin infusion.       ***Hematology***  Acute blood loss anemia - no current transfusion indication          Patient requires continuous monitoring with bedside rhythm monitoring, pulse oximetry monitoring, and continuous central venous and arterial pressure monitoring; and intermittent blood gas analysis. Care plan discussed with the ICU care team.   Patient remains critical, at risk for life threatening decompensation.    I have spent 30 minutes providing critical care management to this patient.    By signing my name below, I, Leyla Lugo, attest that this documentation has been prepared under the direction and in the presence of Helena Machado MD  Electronically signed: Leyla Lugo, 06-06-24 @ 08:51    I, Helena Machado MD, personally performed the services described in this documentation. all medical record entries made by the scribe were at my direction and in my presence. I have reviewed the chart and agree that the record reflects my personal performance and is accurate and complete  Electronically signed: Helena Machado MD Patient seen and examined at the bedside.    Remains critically ill on continuous ICU monitoring.      Brief Summary:  75 yo F with Aortic Insufficiency s/p AVR 6/5/24       24 Hour events:  Extubated postop.  OOB to chair this morning      Objective:  Vital Signs Last 24 Hrs  T(C): 37.7 (06 Jun 2024 04:00), Max: 37.8 (05 Jun 2024 20:00)  T(F): 99.9 (06 Jun 2024 04:00), Max: 100 (05 Jun 2024 20:00)  HR: 75 (06 Jun 2024 06:45) (66 - 91)  BP: 181/70 (05 Jun 2024 09:26) (181/70 - 181/70)  BP(mean): --  RR: 15 (06 Jun 2024 06:45) (10 - 21)  SpO2: 98% (06 Jun 2024 06:45) (94% - 100%)    Parameters below as of 06 Jun 2024 04:00  Patient On (Oxygen Delivery Method): nasal cannula              Physical Exam:   General: Alert and interactive   Neurology: Oriented, following commands  Respiratory: Clear bilaterally   CV: Sinus Rhythm  Abdominal: Soft, Nontender  Extremities: Warm, well-perfused  Erickson     -------------------------------------------------------------------------------------------------------------------------------    Labs:                        10.5   7.00  )-----------( 169      ( 06 Jun 2024 00:33 )             31.0     06-06    139  |  106  |  11  ----------------------------<  158<H>  4.5   |  20<L>  |  0.49<L>    Ca    8.5      06 Jun 2024 00:31  Phos  3.0     06-06  Mg     2.4     06-06    TPro  6.5  /  Alb  4.3  /  TBili  1.0  /  DBili  x   /  AST  80<H>  /  ALT  38  /  AlkPhos  66  06-06    LIVER FUNCTIONS - ( 06 Jun 2024 00:31 )  Alb: 4.3 g/dL / Pro: 6.5 g/dL / ALK PHOS: 66 U/L / ALT: 38 U/L / AST: 80 U/L / GGT: x           PT/INR - ( 06 Jun 2024 00:35 )   PT: 12.7 sec;   INR: 1.16 ratio         PTT - ( 06 Jun 2024 00:35 )  PTT:30.0 sec  ABG - ( 06 Jun 2024 04:57 )  pH, Arterial: 7.39  pH, Blood: x     /  pCO2: 40    /  pO2: 103   / HCO3: 24    / Base Excess: -0.7  /  SaO2: 99.3          ------------------------------------------------------------------------------------------------------------------------------  Assessment:  75 yo F with Aortic Insufficiency s/p AVR 6/5/24     Hypovolemia  Post op respiratory insufficiency  Acute blood loss anemia  Hyperglycemia  Postop acute pain      Plan:   ***Neuro***  Postoperative acute pain control with Tylenol, Gabapentin, and prns.    ***Cardiovascular***  At high risk for hemodynamic instability and cardiac arrhythmias.  Lopressor for heart rate control   Fluid for perioperative hypovolemia - trend lactate and CVP  ASA daily  Monitor chest tube output.    ***Pulmonary***  Postoperative acute respiratory insufficiency  Deep breathing and coughing exercises.  Wean oxygen as able.    ***GI***  Tolerating diet.   Protonix for stress ulcer prophylaxis   Bowel regimen.  Reglan for gut motility     ***Renal***  Trend Creatinine   Erickson catheter for strict I/O measurements.  Replete electrolytes as indicated.    ***ID***  Cefuroxime for perioperative antibiotic coverage     ***Endocrine***  Hyperglycemia - Insulin infusion.     ***Hematology***  Acute blood loss anemia - no current transfusion indication  Trend CBC and monitor for bleeding.  Lovenox for VTE prophylaxis.        Patient requires continuous monitoring with bedside rhythm monitoring, pulse oximetry monitoring, and continuous central venous and arterial pressure monitoring; and intermittent blood gas analysis. Care plan discussed with the ICU care team.   Patient remains critical, at risk for life threatening decompensation.    I have spent 35 minutes providing critical care management to this patient.    By signing my name below, I, Leyla Lugo, attest that this documentation has been prepared under the direction and in the presence of Helena Machado MD  Electronically signed: Leyla Lugo, 06-06-24 @ 08:51    I, Helena Machado MD, personally performed the services described in this documentation. all medical record entries made by the scribe were at my direction and in my presence. I have reviewed the chart and agree that the record reflects my personal performance and is accurate and complete  Electronically signed: Helena Machado MD

## 2024-06-07 DIAGNOSIS — Z95.2 PRESENCE OF PROSTHETIC HEART VALVE: ICD-10-CM

## 2024-06-07 DIAGNOSIS — I10 ESSENTIAL (PRIMARY) HYPERTENSION: ICD-10-CM

## 2024-06-07 DIAGNOSIS — E78.5 HYPERLIPIDEMIA, UNSPECIFIED: ICD-10-CM

## 2024-06-07 LAB
ALBUMIN SERPL ELPH-MCNC: 3.7 G/DL — SIGNIFICANT CHANGE UP (ref 3.3–5)
ALP SERPL-CCNC: 84 U/L — SIGNIFICANT CHANGE UP (ref 40–120)
ALT FLD-CCNC: 55 U/L — HIGH (ref 10–45)
ANION GAP SERPL CALC-SCNC: 10 MMOL/L — SIGNIFICANT CHANGE UP (ref 5–17)
AST SERPL-CCNC: 57 U/L — HIGH (ref 10–40)
BILIRUB SERPL-MCNC: 0.7 MG/DL — SIGNIFICANT CHANGE UP (ref 0.2–1.2)
BUN SERPL-MCNC: 13 MG/DL — SIGNIFICANT CHANGE UP (ref 7–23)
CALCIUM SERPL-MCNC: 8.9 MG/DL — SIGNIFICANT CHANGE UP (ref 8.4–10.5)
CHLORIDE SERPL-SCNC: 100 MMOL/L — SIGNIFICANT CHANGE UP (ref 96–108)
CO2 SERPL-SCNC: 22 MMOL/L — SIGNIFICANT CHANGE UP (ref 22–31)
CREAT SERPL-MCNC: 0.48 MG/DL — LOW (ref 0.5–1.3)
EGFR: 100 ML/MIN/1.73M2 — SIGNIFICANT CHANGE UP
GAS PNL BLDA: SIGNIFICANT CHANGE UP
GLUCOSE SERPL-MCNC: 123 MG/DL — HIGH (ref 70–99)
HCT VFR BLD CALC: 30.4 % — LOW (ref 34.5–45)
HGB BLD-MCNC: 10.1 G/DL — LOW (ref 11.5–15.5)
MAGNESIUM SERPL-MCNC: 2 MG/DL — SIGNIFICANT CHANGE UP (ref 1.6–2.6)
MCHC RBC-ENTMCNC: 28.6 PG — SIGNIFICANT CHANGE UP (ref 27–34)
MCHC RBC-ENTMCNC: 33.2 GM/DL — SIGNIFICANT CHANGE UP (ref 32–36)
MCV RBC AUTO: 86.1 FL — SIGNIFICANT CHANGE UP (ref 80–100)
NRBC # BLD: 0 /100 WBCS — SIGNIFICANT CHANGE UP (ref 0–0)
PHOSPHATE SERPL-MCNC: 1.5 MG/DL — LOW (ref 2.5–4.5)
PLATELET # BLD AUTO: 127 K/UL — LOW (ref 150–400)
POTASSIUM SERPL-MCNC: 3.8 MMOL/L — SIGNIFICANT CHANGE UP (ref 3.5–5.3)
POTASSIUM SERPL-SCNC: 3.8 MMOL/L — SIGNIFICANT CHANGE UP (ref 3.5–5.3)
PROT SERPL-MCNC: 6 G/DL — SIGNIFICANT CHANGE UP (ref 6–8.3)
RBC # BLD: 3.53 M/UL — LOW (ref 3.8–5.2)
RBC # FLD: 15.9 % — HIGH (ref 10.3–14.5)
SODIUM SERPL-SCNC: 132 MMOL/L — LOW (ref 135–145)
T3 SERPL-MCNC: 128 NG/DL — SIGNIFICANT CHANGE UP (ref 80–200)
T4 AB SER-ACNC: 8.2 UG/DL — SIGNIFICANT CHANGE UP (ref 4.6–12)
T4 FREE SERPL-MCNC: 1.3 NG/DL — SIGNIFICANT CHANGE UP (ref 0.9–1.8)
TSH SERPL-MCNC: 2.68 UIU/ML — SIGNIFICANT CHANGE UP (ref 0.27–4.2)
WBC # BLD: 7.98 K/UL — SIGNIFICANT CHANGE UP (ref 3.8–10.5)
WBC # FLD AUTO: 7.98 K/UL — SIGNIFICANT CHANGE UP (ref 3.8–10.5)

## 2024-06-07 PROCEDURE — 71045 X-RAY EXAM CHEST 1 VIEW: CPT | Mod: 26

## 2024-06-07 PROCEDURE — 99233 SBSQ HOSP IP/OBS HIGH 50: CPT

## 2024-06-07 RX ORDER — MAGNESIUM SULFATE 500 MG/ML
2 VIAL (ML) INJECTION ONCE
Refills: 0 | Status: COMPLETED | OUTPATIENT
Start: 2024-06-07 | End: 2024-06-07

## 2024-06-07 RX ORDER — POTASSIUM CHLORIDE 20 MEQ
40 PACKET (EA) ORAL ONCE
Refills: 0 | Status: COMPLETED | OUTPATIENT
Start: 2024-06-07 | End: 2024-06-07

## 2024-06-07 RX ORDER — AMLODIPINE BESYLATE 2.5 MG/1
10 TABLET ORAL DAILY
Refills: 0 | Status: DISCONTINUED | OUTPATIENT
Start: 2024-06-07 | End: 2024-06-08

## 2024-06-07 RX ORDER — METOPROLOL TARTRATE 50 MG
25 TABLET ORAL EVERY 8 HOURS
Refills: 0 | Status: DISCONTINUED | OUTPATIENT
Start: 2024-06-07 | End: 2024-06-08

## 2024-06-07 RX ORDER — SODIUM CHLORIDE 9 MG/ML
3 INJECTION INTRAMUSCULAR; INTRAVENOUS; SUBCUTANEOUS EVERY 8 HOURS
Refills: 0 | Status: DISCONTINUED | OUTPATIENT
Start: 2024-06-07 | End: 2024-06-12

## 2024-06-07 RX ADMIN — AMLODIPINE BESYLATE 10 MILLIGRAM(S): 2.5 TABLET ORAL at 10:05

## 2024-06-07 RX ADMIN — Medication 500 MILLIGRAM(S): at 05:28

## 2024-06-07 RX ADMIN — Medication 650 MILLIGRAM(S): at 00:51

## 2024-06-07 RX ADMIN — Medication 500 MILLIGRAM(S): at 17:30

## 2024-06-07 RX ADMIN — Medication 100 MILLIGRAM(S): at 06:07

## 2024-06-07 RX ADMIN — Medication 40 MILLIEQUIVALENT(S): at 03:32

## 2024-06-07 RX ADMIN — Medication 63.75 MILLIMOLE(S): at 03:32

## 2024-06-07 RX ADMIN — Medication 650 MILLIGRAM(S): at 05:58

## 2024-06-07 RX ADMIN — PANTOPRAZOLE SODIUM 40 MILLIGRAM(S): 20 TABLET, DELAYED RELEASE ORAL at 13:20

## 2024-06-07 RX ADMIN — GABAPENTIN 100 MILLIGRAM(S): 400 CAPSULE ORAL at 05:28

## 2024-06-07 RX ADMIN — CHLORHEXIDINE GLUCONATE 1 APPLICATION(S): 213 SOLUTION TOPICAL at 00:21

## 2024-06-07 RX ADMIN — SPIRONOLACTONE 25 MILLIGRAM(S): 25 TABLET, FILM COATED ORAL at 05:27

## 2024-06-07 RX ADMIN — ENOXAPARIN SODIUM 40 MILLIGRAM(S): 100 INJECTION SUBCUTANEOUS at 21:25

## 2024-06-07 RX ADMIN — Medication 650 MILLIGRAM(S): at 11:38

## 2024-06-07 RX ADMIN — Medication 20 MILLIGRAM(S): at 05:28

## 2024-06-07 RX ADMIN — SODIUM CHLORIDE 3 MILLILITER(S): 9 INJECTION INTRAMUSCULAR; INTRAVENOUS; SUBCUTANEOUS at 13:26

## 2024-06-07 RX ADMIN — SODIUM CHLORIDE 3 MILLILITER(S): 9 INJECTION INTRAMUSCULAR; INTRAVENOUS; SUBCUTANEOUS at 21:30

## 2024-06-07 RX ADMIN — Medication 25 MILLIGRAM(S): at 14:50

## 2024-06-07 RX ADMIN — SPIRONOLACTONE 25 MILLIGRAM(S): 25 TABLET, FILM COATED ORAL at 17:30

## 2024-06-07 RX ADMIN — Medication 81 MILLIGRAM(S): at 11:38

## 2024-06-07 RX ADMIN — Medication 650 MILLIGRAM(S): at 00:21

## 2024-06-07 RX ADMIN — Medication 10 MILLIGRAM(S): at 05:28

## 2024-06-07 RX ADMIN — Medication 50 MILLIGRAM(S): at 05:28

## 2024-06-07 RX ADMIN — Medication 25 GRAM(S): at 10:05

## 2024-06-07 RX ADMIN — GABAPENTIN 100 MILLIGRAM(S): 400 CAPSULE ORAL at 13:20

## 2024-06-07 RX ADMIN — Medication 25 MILLIGRAM(S): at 21:24

## 2024-06-07 RX ADMIN — ATORVASTATIN CALCIUM 40 MILLIGRAM(S): 80 TABLET, FILM COATED ORAL at 21:24

## 2024-06-07 RX ADMIN — SENNA PLUS 2 TABLET(S): 8.6 TABLET ORAL at 21:24

## 2024-06-07 RX ADMIN — GABAPENTIN 100 MILLIGRAM(S): 400 CAPSULE ORAL at 21:24

## 2024-06-07 RX ADMIN — Medication 650 MILLIGRAM(S): at 17:30

## 2024-06-07 RX ADMIN — Medication 650 MILLIGRAM(S): at 05:28

## 2024-06-07 RX ADMIN — POLYETHYLENE GLYCOL 3350 17 GRAM(S): 17 POWDER, FOR SOLUTION ORAL at 13:20

## 2024-06-07 NOTE — PROGRESS NOTE ADULT - ASSESSMENT
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 Pt is scheduled for aortic valve replacement with Dr. Sandeep Jj on 6/3/24. Per pt, she was hospitalized in November due to syncope from vomiting. During her hospitalization she had an echocardiogram that showed severe aortic regurgitation Pt reports palpitations lasting less than a minute before spontaneously resolving, denies triggers and left sided chest pressure. Pt reports plans for cardiac catheterization at [facility] on 5/31/24. After examiation with Dr. Jj, the above surgery was recommended.     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 Pt is scheduled for aortic valve replacement with Dr. Sandeep Jj on 6/3/24. Per pt, she was hospitalized in November due to syncope from vomiting. During her hospitalization she had an echocardiogram that showed severe aortic regurgitation Pt reports palpitations lasting less than a minute before spontaneously resolving, denies triggers and left sided chest pressure. Pt reports plans for cardiac catheterization at [facility] on 5/31/24. After examiation with Dr. Jj, the above surgery was recommended.     S/P 6/5/24 AVR (t)/ 2 units prbc given intraop  postop extubated 6/5  + pressor support/ insulin gttp for glycemic control  sb in ctu- amio ERP d/c as per Dr. 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 Pt is scheduled for aortic valve replacement with Dr. Sandeep Jj on 6/3/24. Per pt, she was hospitalized in November due to syncope from vomiting. During her hospitalization she had an echocardiogram that showed severe aortic regurgitation Pt reports palpitations lasting less than a minute before spontaneously resolving, denies triggers and left sided chest pressure. Pt reports plans for cardiac catheterization at [facility] on 5/31/24. After examiation with Dr. Jj, the above surgery was recommended.     S/P 6/5/24 AVR (t)/ 2 units prbc given intraop  postop extubated 6/5  + pressor support/ insulin gttp for glycemic control  sb in ctu- amio ERP d/c as per Dr. Jj  6/7 vss; rsr 60-70; rt femoral deyvi d/c- bedrest til 1300; tx floor; + epicardial pw - VVI 40; pain management  discharge planning- home when stable

## 2024-06-07 NOTE — PROGRESS NOTE ADULT - SUBJECTIVE AND OBJECTIVE BOX
VITAL SIGNS    Telemetry:    Vital Signs Last 24 Hrs  T(C): 37.2 (24 @ 08:00), Max: 38.2 (24 @ 00:00)  T(F): 98.9 (24 @ 08:00), Max: 100.8 (24 @ 00:00)  HR: 67 (24 @ 10:00) (61 - 92)  BP: 138/62 (24 @ 10:00) (106/54 - 152/77)  RR: 13 (24 @ 10:00) (11 - 26)  SpO2: 97% (24 @ 10:00) (91% - 100%)             @ 07:01  -   @ 07:00  --------------------------------------------------------  IN: 920 mL / OUT: 2420 mL / NET: -1500 mL     @ 07:01  -   @ 11:11  --------------------------------------------------------  IN: 50 mL / OUT: 1300 mL / NET: -1250 mL       Daily     Daily Weight in k.4 (2024 00:00)  Admit Wt: Drug Dosing Weight  Height (cm): 151 (2024 09:43)  Weight (kg): 48.3 (2024 09:43)  BMI (kg/m2): 21.2 (2024 09:43)  BSA (m2): 1.42 (2024 09:43)    Bilirubin Total: 0.7 mg/dL ( @ 00:42)    CAPILLARY BLOOD GLUCOSE  133 (2024 07:00)  139 (2024 16:00)      POCT Blood Glucose.: 133 mg/dL (2024 06:43)  POCT Blood Glucose.: 139 mg/dL (2024 15:57)  POCT Blood Glucose.: 135 mg/dL (2024 11:41)          LABS:     @ 07:01  -   @ 07:00  --------------------------------------------------------  IN: 920 mL / OUT: 2420 mL / NET: -1500 mL     @ 07:01  -   @ 11:11  --------------------------------------------------------  IN: 50 mL / OUT: 1300 mL / NET: -1250 mL    cret                        10.1   7.98  )-----------( 127      ( 2024 00:42 )             30.4         132<L>  |  100  |  13  ----------------------------<  123<H>  3.8   |  22  |  0.48<L>    Ca    8.9      2024 00:42  Phos  1.5       Mg     2.0         TPro  6.0  /  Alb  3.7  /  TBili  0.7  /  DBili  x   /  AST  57<H>  /  ALT  55<H>  /  AlkPhos  84  0607    PT/INR - ( 2024 00:35 )   PT: 12.7 sec;   INR: 1.16 ratio         PTT - ( 2024 00:35 )  PTT:30.0 sec    acetaminophen     Tablet .. 650 milliGRAM(s) Oral every 6 hours  amLODIPine   Tablet 10 milliGRAM(s) Oral daily  ascorbic acid 500 milliGRAM(s) Oral two times a day  aspirin  chewable 81 milliGRAM(s) Oral daily  atorvastatin 40 milliGRAM(s) Oral at bedtime  bisacodyl Suppository 10 milliGRAM(s) Rectal once  chlorhexidine 2% Cloths 1 Application(s) Topical daily  dextrose 50% Injectable 50 milliLiter(s) IV Push every 15 minutes  enoxaparin Injectable 40 milliGRAM(s) SubCutaneous every 24 hours  furosemide    Tablet 20 milliGRAM(s) Oral daily  gabapentin 100 milliGRAM(s) Oral every 8 hours  HYDROmorphone  Injectable 0.5 milliGRAM(s) IV Push every 6 hours PRN  insulin lispro (ADMELOG) corrective regimen sliding scale   SubCutaneous three times a day before meals  metoprolol tartrate 25 milliGRAM(s) Oral every 8 hours  oxyCODONE    IR 5 milliGRAM(s) Oral every 4 hours PRN  oxyCODONE    IR 10 milliGRAM(s) Oral every 4 hours PRN  pantoprazole   Suspension 40 milliGRAM(s) Oral daily  polyethylene glycol 3350 17 Gram(s) Oral daily  senna 2 Tablet(s) Oral at bedtime  sodium chloride 0.9% lock flush 3 milliLiter(s) IV Push every 8 hours  sodium chloride 0.9%. 1000 milliLiter(s) IV Continuous <Continuous>  spironolactone 25 milliGRAM(s) Oral every 12 hours      PHYSICAL EXAM    Subjective: "Hi.   Neurology: alert and oriented x 3, nonfocal, no gross deficits  CV : tele:  RSR  Sternal Wound :  CDI with dressing , Stable  Lungs: clear. RR easy, unlabored   Abdomen: soft, nontender, nondistended, positive bowel sounds, bowel movement   Neg N/V/D   :  pt voiding without difficulty   Extremities:   PERSON; edema, neg calf tenderness.   PPP bilaterally      PW:  Chest tubes:                 VITAL SIGNS    Telemetry:  rsr 60-90   Vital Signs Last 24 Hrs  T(C): 37.2 (24 @ 08:00), Max: 38.2 (24 @ 00:00)  T(F): 98.9 (24 @ 08:00), Max: 100.8 (24 @ 00:00)  HR: 67 (24 @ 10:00) (61 - 92)  BP: 138/62 (24 @ 10:00) (106/54 - 152/77)  RR: 13 (24 @ 10:00) (11 - 26)  SpO2: 97% (24 @ 10:00) (91% - 100%)             07:01  -   @ 07:00  --------------------------------------------------------  IN: 920 mL / OUT: 2420 mL / NET: -1500 mL     @ 07:01  -  07 @ 11:11  --------------------------------------------------------  IN: 50 mL / OUT: 1300 mL / NET: -1250 mL       Daily     Daily Weight in k.4 (2024 00:00)  Admit Wt: Drug Dosing Weight  Height (cm): 151 (2024 09:43)  Weight (kg): 48.3 (2024 09:43)  BMI (kg/m2): 21.2 (:43)  BSA (m2): 1.42 (:43)    Bilirubin Total: 0.7 mg/dL ( @ 00:42)    CAPILLARY BLOOD GLUCOSE  133 (2024 07:00)  139 (2024 16:00)      POCT Blood Glucose.: 133 mg/dL (2024 06:43)  POCT Blood Glucose.: 139 mg/dL (2024 15:57)  POCT Blood Glucose.: 135 mg/dL (2024 11:41)          LABS:     @ 07:  -   @ 07:00  --------------------------------------------------------  IN: 920 mL / OUT: 2420 mL / NET: -1500 mL     @ 07:01  -   @ 11:11  --------------------------------------------------------  IN: 50 mL / OUT: 1300 mL / NET: -1250 mL    cret                        10.1   7.98  )-----------( 127      ( 2024 00:42 )             30.4     -    132<L>  |  100  |  13  ----------------------------<  123<H>  3.8   |  22  |  0.48<L>    Ca    8.9      2024 00:42  Phos  1.5       Mg     2.0         TPro  6.0  /  Alb  3.7  /  TBili  0.7  /  DBili  x   /  AST  57<H>  /  ALT  55<H>  /  AlkPhos  84  06-07    PT/INR - ( 2024 00:35 )   PT: 12.7 sec;   INR: 1.16 ratio         PTT - ( 2024 00:35 )  PTT:30.0 sec    acetaminophen     Tablet .. 650 milliGRAM(s) Oral every 6 hours  amLODIPine   Tablet 10 milliGRAM(s) Oral daily  ascorbic acid 500 milliGRAM(s) Oral two times a day  aspirin  chewable 81 milliGRAM(s) Oral daily  atorvastatin 40 milliGRAM(s) Oral at bedtime  bisacodyl Suppository 10 milliGRAM(s) Rectal once  chlorhexidine 2% Cloths 1 Application(s) Topical daily  dextrose 50% Injectable 50 milliLiter(s) IV Push every 15 minutes  enoxaparin Injectable 40 milliGRAM(s) SubCutaneous every 24 hours  furosemide    Tablet 20 milliGRAM(s) Oral daily  gabapentin 100 milliGRAM(s) Oral every 8 hours  HYDROmorphone  Injectable 0.5 milliGRAM(s) IV Push every 6 hours PRN  insulin lispro (ADMELOG) corrective regimen sliding scale   SubCutaneous three times a day before meals  metoprolol tartrate 25 milliGRAM(s) Oral every 8 hours  oxyCODONE    IR 5 milliGRAM(s) Oral every 4 hours PRN  oxyCODONE    IR 10 milliGRAM(s) Oral every 4 hours PRN  pantoprazole   Suspension 40 milliGRAM(s) Oral daily  polyethylene glycol 3350 17 Gram(s) Oral daily  senna 2 Tablet(s) Oral at bedtime  sodium chloride 0.9% lock flush 3 milliLiter(s) IV Push every 8 hours  sodium chloride 0.9%. 1000 milliLiter(s) IV Continuous <Continuous>  spironolactone 25 milliGRAM(s) Oral every 12 hours      PHYSICAL EXAM    Subjective: "Buddy."   Neurology: alert and oriented x 3, nonfocal, no gross deficits  CV : tele:  RSR 60-70   Sternal Wound :  CDI with dressing , Stable  Lungs: clear. RR easy, unlabored   Abdomen: soft, nontender, nondistended, positive bowel sounds, neg bowel movement   Neg N/V/D   :  pt voiding without difficulty   Extremities:   PERSON; neg LE edema, neg calf tenderness.   PPP bilaterally; rt groin cdi w/ pressure dressing/ sandbag       PW: +VVI 40  Chest tubes: none                 VITAL SIGNS    Telemetry:  rsr    Vital Signs Last 24 Hrs  T(C): 37.2 (24 @ 08:00), Max: 38.2 (24 @ 00:00)  T(F): 98.9 (24 @ 08:00), Max: 100.8 (24 @ 00:00)  HR: 67 (24 @ 10:00) (61 - 92)  BP: 138/62 (24 @ 10:00) (106/54 - 152/77)  RR: 13 (24 @ 10:00) (11 - 26)  SpO2: 97% (24 @ 10:00) (91% - 100%)             07:01  -   @ 07:00  --------------------------------------------------------  IN: 920 mL / OUT: 2420 mL / NET: -1500 mL     @ 07:01  -  07 @ 11:11  --------------------------------------------------------  IN: 50 mL / OUT: 1300 mL / NET: -1250 mL       Daily     Daily Weight in k.4 (2024 00:00)  Admit Wt: Drug Dosing Weight  Height (cm): 151 (2024 09:43)  Weight (kg): 48.3 (2024 09:43)  BMI (kg/m2): 21.2 (:43)  BSA (m2): 1.42 (:43)    Bilirubin Total: 0.7 mg/dL ( @ 00:42)    CAPILLARY BLOOD GLUCOSE  133 (2024 07:00)  139 (2024 16:00)      POCT Blood Glucose.: 133 mg/dL (2024 06:43)  POCT Blood Glucose.: 139 mg/dL (2024 15:57)  POCT Blood Glucose.: 135 mg/dL (2024 11:41)          LABS:     @ 07:  -   @ 07:00  --------------------------------------------------------  IN: 920 mL / OUT: 2420 mL / NET: -1500 mL     @ 07:01  -   @ 11:11  --------------------------------------------------------  IN: 50 mL / OUT: 1300 mL / NET: -1250 mL    cret                        10.1   7.98  )-----------( 127      ( 2024 00:42 )             30.4     -    132<L>  |  100  |  13  ----------------------------<  123<H>  3.8   |  22  |  0.48<L>    Ca    8.9      2024 00:42  Phos  1.5       Mg     2.0         TPro  6.0  /  Alb  3.7  /  TBili  0.7  /  DBili  x   /  AST  57<H>  /  ALT  55<H>  /  AlkPhos  84  06-07    PT/INR - ( 2024 00:35 )   PT: 12.7 sec;   INR: 1.16 ratio         PTT - ( 2024 00:35 )  PTT:30.0 sec    acetaminophen     Tablet .. 650 milliGRAM(s) Oral every 6 hours  amLODIPine   Tablet 10 milliGRAM(s) Oral daily  ascorbic acid 500 milliGRAM(s) Oral two times a day  aspirin  chewable 81 milliGRAM(s) Oral daily  atorvastatin 40 milliGRAM(s) Oral at bedtime  bisacodyl Suppository 10 milliGRAM(s) Rectal once  chlorhexidine 2% Cloths 1 Application(s) Topical daily  dextrose 50% Injectable 50 milliLiter(s) IV Push every 15 minutes  enoxaparin Injectable 40 milliGRAM(s) SubCutaneous every 24 hours  furosemide    Tablet 20 milliGRAM(s) Oral daily  gabapentin 100 milliGRAM(s) Oral every 8 hours  HYDROmorphone  Injectable 0.5 milliGRAM(s) IV Push every 6 hours PRN  insulin lispro (ADMELOG) corrective regimen sliding scale   SubCutaneous three times a day before meals  metoprolol tartrate 25 milliGRAM(s) Oral every 8 hours  oxyCODONE    IR 5 milliGRAM(s) Oral every 4 hours PRN  oxyCODONE    IR 10 milliGRAM(s) Oral every 4 hours PRN  pantoprazole   Suspension 40 milliGRAM(s) Oral daily  polyethylene glycol 3350 17 Gram(s) Oral daily  senna 2 Tablet(s) Oral at bedtime  sodium chloride 0.9% lock flush 3 milliLiter(s) IV Push every 8 hours  sodium chloride 0.9%. 1000 milliLiter(s) IV Continuous <Continuous>  spironolactone 25 milliGRAM(s) Oral every 12 hours      PHYSICAL EXAM    Subjective: "Buddy."   Neurology: alert and oriented x 3, nonfocal, no gross deficits  CV : tele:  RSR    Sternal Wound :  CDI with dressing , Stable  Lungs: clear. RR easy, unlabored   Abdomen: soft, nontender, nondistended, positive bowel sounds, neg bowel movement   Neg N/V/D   :  pt voiding without difficulty   Extremities:   PERSON; neg LE edema, neg calf tenderness.   PPP bilaterally; rt groin cdi w/ pressure dressing/ sandbag       PW: +VVI 40  Chest tubes: none

## 2024-06-07 NOTE — PROGRESS NOTE ADULT - PROBLEM SELECTOR PLAN 1
continue postop care  continue asa and statin  continue bb- lop 25 po q8   + epicardial pw - VVI 40  norvasc 10 qd for HTN   pulm toilet  pain management  increase activity as tolerated  bowel regimen  discharge planning- home when stable continue postop care  continue asa and statin  continue bb- lop 25 po q8   + epicardial pw - VVI 40  norvasc 10 qd for HTN   rt femoral deyvi d/c- compression in place; bedrest til 1300   pulm toilet  pain management  increase activity as tolerated  bowel regimen  discharge planning- home when stable

## 2024-06-07 NOTE — PROGRESS NOTE ADULT - SUBJECTIVE AND OBJECTIVE BOX
CRITICAL CARE ATTENDING - CTICU    MEDICATIONS  (STANDING):  acetaminophen     Tablet .. 650 milliGRAM(s) Oral every 6 hours  ascorbic acid 500 milliGRAM(s) Oral two times a day  aspirin  chewable 81 milliGRAM(s) Oral daily  atorvastatin 40 milliGRAM(s) Oral at bedtime  bisacodyl Suppository 10 milliGRAM(s) Rectal once  chlorhexidine 2% Cloths 1 Application(s) Topical daily  dextrose 50% Injectable 50 milliLiter(s) IV Push every 15 minutes  enoxaparin Injectable 40 milliGRAM(s) SubCutaneous every 24 hours  furosemide    Tablet 20 milliGRAM(s) Oral daily  gabapentin 100 milliGRAM(s) Oral every 8 hours  insulin lispro (ADMELOG) corrective regimen sliding scale   SubCutaneous three times a day before meals  metoprolol tartrate 50 milliGRAM(s) Oral every 8 hours  pantoprazole   Suspension 40 milliGRAM(s) Oral daily  polyethylene glycol 3350 17 Gram(s) Oral daily  senna 2 Tablet(s) Oral at bedtime  sodium chloride 0.9%. 1000 milliLiter(s) (10 mL/Hr) IV Continuous <Continuous>  spironolactone 25 milliGRAM(s) Oral every 12 hours                                    10.1   7.98  )-----------( 127      ( 2024 00:42 )             30.4       06-    132<L>  |  100  |  13  ----------------------------<  123<H>  3.8   |  22  |  0.48<L>    Ca    8.9      2024 00:42  Phos  1.5     06-  Mg     2.0     06-    TPro  6.0  /  Alb  3.7  /  TBili  0.7  /  DBili  x   /  AST  57<H>  /  ALT  55<H>  /  AlkPhos  84  06-      PT/INR - ( 2024 00:35 )   PT: 12.7 sec;   INR: 1.16 ratio         PTT - ( 2024 00:35 )  PTT:30.0 sec        Daily     Daily Weight in k.4 (2024 00:00)      06-06 @ 07:01  -  06- @ 07:00  --------------------------------------------------------  IN: 920 mL / OUT: 2420 mL / NET: -1500 mL        Critically Ill patient  : [ ] preoperative ,   [ x] post operative    Requires :  [x ] Arterial Line   [ x] Central Line  [ ] PA catheter  [ ] IABP  [ ] ECMO  [ ] LVAD  [ ] Ventilator  [x ] pacemaker- TPM [ ] Impella.                      [ x] ABG's     [x ] Pulse Oxymetry Monitoring  Bedside evaluation , monitoring , treatment of hemodynamics , fluids , IVP/ IVCD meds.        Diagnosis:     POD 2- AVR     Hypovolemia     Thrombocytopenia     ASA/ Lopressor     Chest Tube Drainage/ Management     Fluid Overload     Requires chest PT, pulmonary toilet, suctioning to maintain SaO2,  patent airway and treat atelectasis.     Temporary pacemaker (TPM) interrogation and setting.     Hyperglycemia- Insulin Sliding Scale     Requires bedside physical therapy, mobilization and total [facility] care.             I, Ashok Nelson, personally performed the services described in this documentation. All medical record entries made by the scribe were at my direction and in my presence. I have reviewed the chart and agree that the record reflects my personal performance and is accurate and complete.   Ashok Nelson MD.       By signing my name below, I, De Valero, attest that this documentation has been prepared under the direction and in the presence of Ashok Nelson MD.   Electronically Signed: Juan M Kenyon 24 @ 07:00        Discussed with CT surgeon, Physician Assistant - Nurse Practitioner- Critical care medicine team.   Dicussed at  AM / PM rounds.   Chart, labs , films reviewed.    Cumulative Critical Care Time Given Today:  CRITICAL CARE ATTENDING - CTICU    MEDICATIONS  (STANDING):  acetaminophen     Tablet .. 650 milliGRAM(s) Oral every 6 hours  ascorbic acid 500 milliGRAM(s) Oral two times a day  aspirin  chewable 81 milliGRAM(s) Oral daily  atorvastatin 40 milliGRAM(s) Oral at bedtime  bisacodyl Suppository 10 milliGRAM(s) Rectal once  chlorhexidine 2% Cloths 1 Application(s) Topical daily  dextrose 50% Injectable 50 milliLiter(s) IV Push every 15 minutes  enoxaparin Injectable 40 milliGRAM(s) SubCutaneous every 24 hours  furosemide    Tablet 20 milliGRAM(s) Oral daily  gabapentin 100 milliGRAM(s) Oral every 8 hours  insulin lispro (ADMELOG) corrective regimen sliding scale   SubCutaneous three times a day before meals  metoprolol tartrate 50 milliGRAM(s) Oral every 8 hours  pantoprazole   Suspension 40 milliGRAM(s) Oral daily  polyethylene glycol 3350 17 Gram(s) Oral daily  senna 2 Tablet(s) Oral at bedtime  sodium chloride 0.9%. 1000 milliLiter(s) (10 mL/Hr) IV Continuous <Continuous>  spironolactone 25 milliGRAM(s) Oral every 12 hours                                    10.1   7.98  )-----------( 127      ( 2024 00:42 )             30.4       06-    132<L>  |  100  |  13  ----------------------------<  123<H>  3.8   |  22  |  0.48<L>    Ca    8.9      2024 00:42  Phos  1.5     06-  Mg     2.0     06-    TPro  6.0  /  Alb  3.7  /  TBili  0.7  /  DBili  x   /  AST  57<H>  /  ALT  55<H>  /  AlkPhos  84  06-      PT/INR - ( 2024 00:35 )   PT: 12.7 sec;   INR: 1.16 ratio         PTT - ( 2024 00:35 )  PTT:30.0 sec        Daily     Daily Weight in k.4 (2024 00:00)      06-06 @ 07:01  -  06- @ 07:00  --------------------------------------------------------  IN: 920 mL / OUT: 2420 mL / NET: -1500 mL        Critically Ill patient  : [ ] preoperative ,   [ x] post operative    Requires :  [x ] Arterial Line   [ x] Central Line  [ ] PA catheter  [ ] IABP  [ ] ECMO  [ ] LVAD  [ ] Ventilator  [x ] pacemaker- TPM [ ] Impella.                      [ x] ABG's     [x ] Pulse Oxymetry Monitoring  Bedside evaluation , monitoring , treatment of hemodynamics , fluids , IVP/ IVCD meds.        Diagnosis:     POD 2- AVR     Hypovolemia     Thrombocytopenia     ASA/ Lopressor     Chest Tube Drainage/ Management     CHF- acute [ x]   chronic [x ]    systolic [ ]   diastolic [ ]  Valvular [x ]          - Echo- EF -   AI          [ ] RV dysfunction          - Cxr-cardiomegally,           - Clinical-  [ ]inotropes   [ ]pressors   [ x]diuresis   [ ]IABP   [ ]ECMO   [ ]LVAD   [ ]Respiratory Failure       -     Requires chest PT, pulmonary toilet, suctioning to maintain SaO2,  patent airway and treat atelectasis.     Temporary pacemaker (TPM) interrogation and setting.     Hyperglycemia- Insulin Sliding Scale     Requires bedside physical therapy, mobilization and total correction care.             I, Ashok Nelson, personally performed the services described in this documentation. All medical record entries made by the scribe were at my direction and in my presence. I have reviewed the chart and agree that the record reflects my personal performance and is accurate and complete.   Ashok Nelson MD.       By signing my name below, I, De Valero, attest that this documentation has been prepared under the direction and in the presence of Ashok Nelson MD.   Electronically Signed: Juan M Kenyon 24 @ 07:00        Discussed with CT surgeon, Physician Assistant - Nurse Practitioner- Critical care medicine team.   Dicussed at  AM / PM rounds.   Chart, labs , films reviewed.    Cumulative Critical Care Time Given Today:  20 min

## 2024-06-08 LAB
ANION GAP SERPL CALC-SCNC: 10 MMOL/L — SIGNIFICANT CHANGE UP (ref 5–17)
BUN SERPL-MCNC: 11 MG/DL — SIGNIFICANT CHANGE UP (ref 7–23)
CALCIUM SERPL-MCNC: 8.7 MG/DL — SIGNIFICANT CHANGE UP (ref 8.4–10.5)
CHLORIDE SERPL-SCNC: 103 MMOL/L — SIGNIFICANT CHANGE UP (ref 96–108)
CO2 SERPL-SCNC: 25 MMOL/L — SIGNIFICANT CHANGE UP (ref 22–31)
CREAT SERPL-MCNC: 0.58 MG/DL — SIGNIFICANT CHANGE UP (ref 0.5–1.3)
EGFR: 95 ML/MIN/1.73M2 — SIGNIFICANT CHANGE UP
GLUCOSE SERPL-MCNC: 111 MG/DL — HIGH (ref 70–99)
HCT VFR BLD CALC: 29.4 % — LOW (ref 34.5–45)
HGB BLD-MCNC: 9.7 G/DL — LOW (ref 11.5–15.5)
MAGNESIUM SERPL-MCNC: 2.1 MG/DL — SIGNIFICANT CHANGE UP (ref 1.6–2.6)
MCHC RBC-ENTMCNC: 28.3 PG — SIGNIFICANT CHANGE UP (ref 27–34)
MCHC RBC-ENTMCNC: 33 GM/DL — SIGNIFICANT CHANGE UP (ref 32–36)
MCV RBC AUTO: 85.7 FL — SIGNIFICANT CHANGE UP (ref 80–100)
NRBC # BLD: 0 /100 WBCS — SIGNIFICANT CHANGE UP (ref 0–0)
PHOSPHATE SERPL-MCNC: 1.4 MG/DL — LOW (ref 2.5–4.5)
PLATELET # BLD AUTO: 117 K/UL — LOW (ref 150–400)
POTASSIUM SERPL-MCNC: 4 MMOL/L — SIGNIFICANT CHANGE UP (ref 3.5–5.3)
POTASSIUM SERPL-SCNC: 4 MMOL/L — SIGNIFICANT CHANGE UP (ref 3.5–5.3)
RBC # BLD: 3.43 M/UL — LOW (ref 3.8–5.2)
RBC # FLD: 15.9 % — HIGH (ref 10.3–14.5)
SODIUM SERPL-SCNC: 138 MMOL/L — SIGNIFICANT CHANGE UP (ref 135–145)
WBC # BLD: 8.45 K/UL — SIGNIFICANT CHANGE UP (ref 3.8–10.5)
WBC # FLD AUTO: 8.45 K/UL — SIGNIFICANT CHANGE UP (ref 3.8–10.5)

## 2024-06-08 PROCEDURE — 71045 X-RAY EXAM CHEST 1 VIEW: CPT | Mod: 26

## 2024-06-08 RX ORDER — ALBUMIN HUMAN 25 %
100 VIAL (ML) INTRAVENOUS ONCE
Refills: 0 | Status: COMPLETED | OUTPATIENT
Start: 2024-06-08 | End: 2024-06-08

## 2024-06-08 RX ORDER — METOPROLOL TARTRATE 50 MG
25 TABLET ORAL ONCE
Refills: 0 | Status: COMPLETED | OUTPATIENT
Start: 2024-06-08 | End: 2024-06-08

## 2024-06-08 RX ORDER — METOPROLOL TARTRATE 50 MG
50 TABLET ORAL
Refills: 0 | Status: DISCONTINUED | OUTPATIENT
Start: 2024-06-08 | End: 2024-06-08

## 2024-06-08 RX ORDER — METOPROLOL TARTRATE 50 MG
25 TABLET ORAL
Refills: 0 | Status: DISCONTINUED | OUTPATIENT
Start: 2024-06-08 | End: 2024-06-09

## 2024-06-08 RX ADMIN — Medication 500 MILLIGRAM(S): at 06:16

## 2024-06-08 RX ADMIN — Medication 650 MILLIGRAM(S): at 06:16

## 2024-06-08 RX ADMIN — AMLODIPINE BESYLATE 10 MILLIGRAM(S): 2.5 TABLET ORAL at 06:16

## 2024-06-08 RX ADMIN — ENOXAPARIN SODIUM 40 MILLIGRAM(S): 100 INJECTION SUBCUTANEOUS at 12:20

## 2024-06-08 RX ADMIN — Medication 100 MILLILITER(S): at 13:56

## 2024-06-08 RX ADMIN — Medication 20 MILLIGRAM(S): at 06:16

## 2024-06-08 RX ADMIN — SODIUM CHLORIDE 3 MILLILITER(S): 9 INJECTION INTRAMUSCULAR; INTRAVENOUS; SUBCUTANEOUS at 06:50

## 2024-06-08 RX ADMIN — GABAPENTIN 100 MILLIGRAM(S): 400 CAPSULE ORAL at 06:16

## 2024-06-08 RX ADMIN — Medication 81 MILLIGRAM(S): at 12:21

## 2024-06-08 RX ADMIN — Medication 500 MILLIGRAM(S): at 17:13

## 2024-06-08 RX ADMIN — Medication 25 MILLIGRAM(S): at 17:13

## 2024-06-08 RX ADMIN — SODIUM CHLORIDE 3 MILLILITER(S): 9 INJECTION INTRAMUSCULAR; INTRAVENOUS; SUBCUTANEOUS at 21:52

## 2024-06-08 RX ADMIN — ATORVASTATIN CALCIUM 40 MILLIGRAM(S): 80 TABLET, FILM COATED ORAL at 22:10

## 2024-06-08 RX ADMIN — Medication 50 MILLILITER(S): at 09:55

## 2024-06-08 RX ADMIN — SODIUM CHLORIDE 3 MILLILITER(S): 9 INJECTION INTRAMUSCULAR; INTRAVENOUS; SUBCUTANEOUS at 12:32

## 2024-06-08 RX ADMIN — CHLORHEXIDINE GLUCONATE 1 APPLICATION(S): 213 SOLUTION TOPICAL at 08:35

## 2024-06-08 RX ADMIN — Medication 650 MILLIGRAM(S): at 12:21

## 2024-06-08 RX ADMIN — SPIRONOLACTONE 25 MILLIGRAM(S): 25 TABLET, FILM COATED ORAL at 06:50

## 2024-06-08 RX ADMIN — PANTOPRAZOLE SODIUM 40 MILLIGRAM(S): 20 TABLET, DELAYED RELEASE ORAL at 12:21

## 2024-06-08 RX ADMIN — Medication 25 MILLIGRAM(S): at 06:16

## 2024-06-08 NOTE — PROGRESS NOTE ADULT - PROBLEM SELECTOR PLAN 1
continue postop care  continue asa and statin  continue bb- lop 25 po q8   + epicardial pw - VVI 40  norvasc 10 qd for HTN   rt femoral deyvi d/c- compression in place; bedrest til 1300   pulm toilet  pain management  increase activity as tolerated  bowel regimen  discharge planning- home when stable continue postop care  continue asa and statin  decrease lop 25 mg po bid- hypotensive this am  d/c norvasc and diuretics secondary to hypotension this am   + epicardial pw - VVI 40   pulm toilet/ wean O2 as tolerated   pain management  increase activity as tolerated  bowel regimen  discharge planning- home when stable

## 2024-06-08 NOTE — PROGRESS NOTE ADULT - SUBJECTIVE AND OBJECTIVE BOX
VITAL SIGNS    Telemetry:    Vital Signs Last 24 Hrs  T(C): 37.2 (06-08-24 @ 04:11), Max: 37.3 (06-07-24 @ 11:42)  T(F): 99 (06-08-24 @ 04:11), Max: 99.1 (06-07-24 @ 11:42)  HR: 61 (06-08-24 @ 04:11) (61 - 94)  BP: 122/75 (06-08-24 @ 04:11) (97/61 - 146/78)  RR: 18 (06-08-24 @ 04:11) (13 - 26)  SpO2: 96% (06-08-24 @ 04:11) (92% - 100%)            06-07 @ 07:01  -  06-08 @ 07:00  --------------------------------------------------------  IN: 490 mL / OUT: 1750 mL / NET: -1260 mL       Daily     Daily   Admit Wt: Drug Dosing Weight  Height (cm): 151 (05 Jun 2024 09:43)  Weight (kg): 48.3 (05 Jun 2024 09:43)  BMI (kg/m2): 21.2 (05 Jun 2024 09:43)  BSA (m2): 1.42 (05 Jun 2024 09:43)      CAPILLARY BLOOD GLUCOSE      POCT Blood Glucose.: 161 mg/dL (07 Jun 2024 21:20)  POCT Blood Glucose.: 115 mg/dL (07 Jun 2024 16:18)  POCT Blood Glucose.: 110 mg/dL (07 Jun 2024 11:30)          LABS:    06-07 @ 07:01  -  06-08 @ 07:00  --------------------------------------------------------  IN: 490 mL / OUT: 1750 mL / NET: -1260 mL    cret                        10.1   7.98  )-----------( 127      ( 07 Jun 2024 00:42 )             30.4     06-07    132<L>  |  100  |  13  ----------------------------<  123<H>  3.8   |  22  |  0.48<L>    Ca    8.9      07 Jun 2024 00:42  Phos  1.5     06-07  Mg     2.0     06-07    TPro  6.0  /  Alb  3.7  /  TBili  0.7  /  DBili  x   /  AST  57<H>  /  ALT  55<H>  /  AlkPhos  84  06-07        acetaminophen     Tablet .. 650 milliGRAM(s) Oral every 6 hours  acetaminophen     Tablet .. 650 milliGRAM(s) Oral every 6 hours PRN  amLODIPine   Tablet 10 milliGRAM(s) Oral daily  ascorbic acid 500 milliGRAM(s) Oral two times a day  aspirin  chewable 81 milliGRAM(s) Oral daily  atorvastatin 40 milliGRAM(s) Oral at bedtime  bisacodyl Suppository 10 milliGRAM(s) Rectal once  chlorhexidine 2% Cloths 1 Application(s) Topical daily  dextrose 50% Injectable 50 milliLiter(s) IV Push every 15 minutes  enoxaparin Injectable 40 milliGRAM(s) SubCutaneous every 24 hours  furosemide    Tablet 20 milliGRAM(s) Oral daily  gabapentin 100 milliGRAM(s) Oral every 8 hours  insulin lispro (ADMELOG) corrective regimen sliding scale   SubCutaneous three times a day before meals  metoprolol tartrate 25 milliGRAM(s) Oral every 8 hours  oxyCODONE    IR 5 milliGRAM(s) Oral every 4 hours PRN  oxyCODONE    IR 10 milliGRAM(s) Oral every 4 hours PRN  pantoprazole   Suspension 40 milliGRAM(s) Oral daily  polyethylene glycol 3350 17 Gram(s) Oral daily  senna 2 Tablet(s) Oral at bedtime  sodium chloride 0.9% lock flush 3 milliLiter(s) IV Push every 8 hours  sodium chloride 0.9%. 1000 milliLiter(s) IV Continuous <Continuous>  spironolactone 25 milliGRAM(s) Oral every 12 hours      PHYSICAL EXAM    Subjective: "Hi.   Neurology: alert and oriented x 3, nonfocal, no gross deficits  CV : tele:  RSR  Sternal Wound :  CDI with dressing , Stable  Lungs: clear. RR easy, unlabored   Abdomen: soft, nontender, nondistended, positive bowel sounds, bowel movement   Neg N/V/D   :  pt voiding without difficulty   Extremities:   PERSON; edema, neg calf tenderness.   PPP bilaterally      PW:  Chest tubes:                 VITAL SIGNS    Telemetry: RSR    Vital Signs Last 24 Hrs  T(C): 37.2 (06-08-24 @ 04:11), Max: 37.3 (06-07-24 @ 11:42)  T(F): 99 (06-08-24 @ 04:11), Max: 99.1 (06-07-24 @ 11:42)  HR: 61 (06-08-24 @ 04:11) (61 - 94)  BP: 122/75 (06-08-24 @ 04:11) (97/61 - 146/78)  RR: 18 (06-08-24 @ 04:11) (13 - 26)  SpO2: 96% (06-08-24 @ 04:11) (92% - 100%)            06-07 @ 07:01  -  06-08 @ 07:00  --------------------------------------------------------  IN: 490 mL / OUT: 1750 mL / NET: -1260 mL       Daily     Daily   Admit Wt: Drug Dosing Weight  Height (cm): 151 (05 Jun 2024 09:43)  Weight (kg): 48.3 (05 Jun 2024 09:43)  BMI (kg/m2): 21.2 (05 Jun 2024 09:43)  BSA (m2): 1.42 (05 Jun 2024 09:43)      CAPILLARY BLOOD GLUCOSE      POCT Blood Glucose.: 161 mg/dL (07 Jun 2024 21:20)  POCT Blood Glucose.: 115 mg/dL (07 Jun 2024 16:18)  POCT Blood Glucose.: 110 mg/dL (07 Jun 2024 11:30)          LABS:    06-07 @ 07:01  -  06-08 @ 07:00  --------------------------------------------------------  IN: 490 mL / OUT: 1750 mL / NET: -1260 mL    cret                        10.1   7.98  )-----------( 127      ( 07 Jun 2024 00:42 )             30.4     06-07    132<L>  |  100  |  13  ----------------------------<  123<H>  3.8   |  22  |  0.48<L>    Ca    8.9      07 Jun 2024 00:42  Phos  1.5     06-07  Mg     2.0     06-07    TPro  6.0  /  Alb  3.7  /  TBili  0.7  /  DBili  x   /  AST  57<H>  /  ALT  55<H>  /  AlkPhos  84  06-07        acetaminophen     Tablet .. 650 milliGRAM(s) Oral every 6 hours  acetaminophen     Tablet .. 650 milliGRAM(s) Oral every 6 hours PRN  amLODIPine   Tablet 10 milliGRAM(s) Oral daily  ascorbic acid 500 milliGRAM(s) Oral two times a day  aspirin  chewable 81 milliGRAM(s) Oral daily  atorvastatin 40 milliGRAM(s) Oral at bedtime  bisacodyl Suppository 10 milliGRAM(s) Rectal once  chlorhexidine 2% Cloths 1 Application(s) Topical daily  dextrose 50% Injectable 50 milliLiter(s) IV Push every 15 minutes  enoxaparin Injectable 40 milliGRAM(s) SubCutaneous every 24 hours  furosemide    Tablet 20 milliGRAM(s) Oral daily  gabapentin 100 milliGRAM(s) Oral every 8 hours  insulin lispro (ADMELOG) corrective regimen sliding scale   SubCutaneous three times a day before meals  metoprolol tartrate 25 milliGRAM(s) Oral every 8 hours  oxyCODONE    IR 5 milliGRAM(s) Oral every 4 hours PRN  oxyCODONE    IR 10 milliGRAM(s) Oral every 4 hours PRN  pantoprazole   Suspension 40 milliGRAM(s) Oral daily  polyethylene glycol 3350 17 Gram(s) Oral daily  senna 2 Tablet(s) Oral at bedtime  sodium chloride 0.9% lock flush 3 milliLiter(s) IV Push every 8 hours  sodium chloride 0.9%. 1000 milliLiter(s) IV Continuous <Continuous>  spironolactone 25 milliGRAM(s) Oral every 12 hours        PHYSICAL EXAM    Subjective: "Buddy."   Neurology: alert and oriented x 3, nonfocal, no gross deficits  CV : tele:  RSR    Sternal Wound :  CDI CASE- sternum stable   Lungs: clear. RR easy, unlabored   Abdomen: soft, nontender, nondistended, positive bowel sounds, + bowel movement   Neg N/V/D   :  pt voiding without difficulty   Extremities:   PERSON; neg LE edema, neg calf tenderness.   PPP bilaterally; rt groin cdi case- neg hematoma/bleeding     PW: +VVI 40  Chest tubes: none

## 2024-06-08 NOTE — PROGRESS NOTE ADULT - ASSESSMENT
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 Pt is scheduled for aortic valve replacement with Dr. Sandeep Jj on 6/3/24. Per pt, she was hospitalized in November due to syncope from vomiting. During her hospitalization she had an echocardiogram that showed severe aortic regurgitation Pt reports palpitations lasting less than a minute before spontaneously resolving, denies triggers and left sided chest pressure. Pt reports plans for cardiac catheterization at [facility] on 5/31/24. After examiation with Dr. Jj, the above surgery was recommended.     S/P 6/5/24 AVR (t)/ 2 units prbc given intraop  postop extubated 6/5  + pressor support/ insulin gttp for glycemic control  sb in ctu- amio ERP d/c as per Dr. 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 Pt is scheduled for aortic valve replacement with Dr. Sandeep Jj on 6/3/24. Per pt, she was hospitalized in November due to syncope from vomiting. During her hospitalization she had an echocardiogram that showed severe aortic regurgitation Pt reports palpitations lasting less than a minute before spontaneously resolving, denies triggers and left sided chest pressure. Pt reports plans for cardiac catheterization at [facility] on 5/31/24. After examiation with Dr. Jj, the above surgery was recommended.     S/P 6/5/24 AVR (t)/ 2 units prbc given intraop  postop extubated 6/5  + pressor support/ insulin gttp for glycemic control  sb in ctu- amio ERP d/c as per Dr. Jj  6/7 vss; rsr 60-70; rt femoral deyvi d/c- bedrest til 1300; tx floor; + epicardial pw - VVI 40; pain management  6/8 vss; rsr ; hypotension noted this am--> albumin given; diuretics and norvasac d/c; lop decreased 25 po bid; bp responded   discharge planning- home when stable

## 2024-06-08 NOTE — OCCUPATIONAL THERAPY INITIAL EVALUATION ADULT - PERTINENT HX OF CURRENT PROBLEM, REHAB EVAL
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 Pt is scheduled for aortic valve replacement with Dr. Sandeep Jj on 6/3/24. Per pt, she was hospitalized in November due to syncope from vomiting. During her hospitalization she had an echocardiogram that showed severe aortic regurgitation Pt reports palpitations lasting less than a minute before spontaneously resolving, denies triggers and left sided chest pressure. Pt reports plans for cardiac catheterization at [facility] on 5/31/24. s/p AVR 6/5

## 2024-06-08 NOTE — PROGRESS NOTE ADULT - PROBLEM SELECTOR PLAN 2
continue norvasc 10 qd  continue lop 25 mg po q8  monitor vs continue lop 25 mg po  hypotension noted this am--> albumin given; diuretics and norvasac d/c; lop decreased 25 po bid; bp responded  monitor VS

## 2024-06-09 LAB
ANION GAP SERPL CALC-SCNC: 10 MMOL/L — SIGNIFICANT CHANGE UP (ref 5–17)
BUN SERPL-MCNC: 7 MG/DL — SIGNIFICANT CHANGE UP (ref 7–23)
CALCIUM SERPL-MCNC: 9.4 MG/DL — SIGNIFICANT CHANGE UP (ref 8.4–10.5)
CHLORIDE SERPL-SCNC: 103 MMOL/L — SIGNIFICANT CHANGE UP (ref 96–108)
CO2 SERPL-SCNC: 24 MMOL/L — SIGNIFICANT CHANGE UP (ref 22–31)
CREAT SERPL-MCNC: 0.42 MG/DL — LOW (ref 0.5–1.3)
EGFR: 103 ML/MIN/1.73M2 — SIGNIFICANT CHANGE UP
GLUCOSE SERPL-MCNC: 104 MG/DL — HIGH (ref 70–99)
HCT VFR BLD CALC: 28.6 % — LOW (ref 34.5–45)
HGB BLD-MCNC: 9.2 G/DL — LOW (ref 11.5–15.5)
MCHC RBC-ENTMCNC: 28 PG — SIGNIFICANT CHANGE UP (ref 27–34)
MCHC RBC-ENTMCNC: 32.2 GM/DL — SIGNIFICANT CHANGE UP (ref 32–36)
MCV RBC AUTO: 87.2 FL — SIGNIFICANT CHANGE UP (ref 80–100)
NRBC # BLD: 0 /100 WBCS — SIGNIFICANT CHANGE UP (ref 0–0)
PLATELET # BLD AUTO: 116 K/UL — LOW (ref 150–400)
POTASSIUM SERPL-MCNC: 3.7 MMOL/L — SIGNIFICANT CHANGE UP (ref 3.5–5.3)
POTASSIUM SERPL-SCNC: 3.7 MMOL/L — SIGNIFICANT CHANGE UP (ref 3.5–5.3)
RBC # BLD: 3.28 M/UL — LOW (ref 3.8–5.2)
RBC # FLD: 15.8 % — HIGH (ref 10.3–14.5)
SODIUM SERPL-SCNC: 137 MMOL/L — SIGNIFICANT CHANGE UP (ref 135–145)
WBC # BLD: 6.69 K/UL — SIGNIFICANT CHANGE UP (ref 3.8–10.5)
WBC # FLD AUTO: 6.69 K/UL — SIGNIFICANT CHANGE UP (ref 3.8–10.5)

## 2024-06-09 RX ORDER — METOPROLOL TARTRATE 50 MG
25 TABLET ORAL ONCE
Refills: 0 | Status: COMPLETED | OUTPATIENT
Start: 2024-06-09 | End: 2024-06-09

## 2024-06-09 RX ORDER — POTASSIUM CHLORIDE 20 MEQ
20 PACKET (EA) ORAL ONCE
Refills: 0 | Status: COMPLETED | OUTPATIENT
Start: 2024-06-09 | End: 2024-06-09

## 2024-06-09 RX ORDER — METOPROLOL TARTRATE 50 MG
50 TABLET ORAL
Refills: 0 | Status: DISCONTINUED | OUTPATIENT
Start: 2024-06-09 | End: 2024-06-11

## 2024-06-09 RX ADMIN — Medication 500 MILLIGRAM(S): at 05:42

## 2024-06-09 RX ADMIN — Medication 500 MILLIGRAM(S): at 17:13

## 2024-06-09 RX ADMIN — Medication 25 MILLIGRAM(S): at 11:22

## 2024-06-09 RX ADMIN — SODIUM CHLORIDE 3 MILLILITER(S): 9 INJECTION INTRAMUSCULAR; INTRAVENOUS; SUBCUTANEOUS at 13:13

## 2024-06-09 RX ADMIN — Medication 20 MILLIEQUIVALENT(S): at 11:22

## 2024-06-09 RX ADMIN — PANTOPRAZOLE SODIUM 40 MILLIGRAM(S): 20 TABLET, DELAYED RELEASE ORAL at 11:22

## 2024-06-09 RX ADMIN — POLYETHYLENE GLYCOL 3350 17 GRAM(S): 17 POWDER, FOR SOLUTION ORAL at 11:22

## 2024-06-09 RX ADMIN — ATORVASTATIN CALCIUM 40 MILLIGRAM(S): 80 TABLET, FILM COATED ORAL at 21:07

## 2024-06-09 RX ADMIN — Medication 81 MILLIGRAM(S): at 11:22

## 2024-06-09 RX ADMIN — SODIUM CHLORIDE 3 MILLILITER(S): 9 INJECTION INTRAMUSCULAR; INTRAVENOUS; SUBCUTANEOUS at 21:00

## 2024-06-09 RX ADMIN — SODIUM CHLORIDE 3 MILLILITER(S): 9 INJECTION INTRAMUSCULAR; INTRAVENOUS; SUBCUTANEOUS at 05:01

## 2024-06-09 RX ADMIN — Medication 25 MILLIGRAM(S): at 05:41

## 2024-06-09 RX ADMIN — ENOXAPARIN SODIUM 40 MILLIGRAM(S): 100 INJECTION SUBCUTANEOUS at 11:21

## 2024-06-09 RX ADMIN — Medication 50 MILLIGRAM(S): at 17:14

## 2024-06-09 NOTE — PROGRESS NOTE ADULT - PROBLEM SELECTOR PLAN 1
continue postop care  continue asa and statin  decrease lop 25 mg po bid- hypotensive this am  d/c norvasc and diuretics secondary to hypotension this am   + epicardial pw - VVI 40   pulm toilet/ wean O2 as tolerated   pain management  increase activity as tolerated  bowel regimen  discharge planning- home when stable

## 2024-06-09 NOTE — PROGRESS NOTE ADULT - ASSESSMENT
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 Pt is scheduled for aortic valve replacement with Dr. Sandeep Jj on 6/3/24. Per pt, she was hospitalized in November due to syncope from vomiting. During her hospitalization she had an echocardiogram that showed severe aortic regurgitation Pt reports palpitations lasting less than a minute before spontaneously resolving, denies triggers and left sided chest pressure. Pt reports plans for cardiac catheterization at [facility] on 5/31/24. After examiation with Dr. Jj, the above surgery was recommended.     S/P 6/5/24 AVR (t)/ 2 units prbc given intraop  postop extubated 6/5  + pressor support/ insulin gttp for glycemic control  sb in ctu- amio ERP d/c as per Dr. Jj  6/7 vss; rsr 60-70; rt femoral deyvi d/c- bedrest til 1300; tx floor; + epicardial pw - VVI 40; pain management  6/8 vss; rsr ; hypotension noted this am--> albumin given; diuretics and norvasac d/c; lop decreased 25 po bid; bp responded   6/9 VSS will isolate pw today -  discharge planning- home when stable

## 2024-06-09 NOTE — PROGRESS NOTE ADULT - PROBLEM SELECTOR PLAN 2
continue lop 25 mg po  hypotension noted this am--> albumin given; diuretics and norvasac d/c; lop decreased 25 po bid; bp responded  monitor VS

## 2024-06-09 NOTE — PROGRESS NOTE ADULT - SUBJECTIVE AND OBJECTIVE BOX
VITAL SIGNS-Telemetry:  SR/ST   Vital Signs Last 24 Hrs  T(C): 37 (24 @ 04:15), Max: 37.1 (24 @ 19:44)  T(F): 98.6 (24 @ 04:15), Max: 98.8 (24 @ 19:44)  HR: 92 (24 @ 05:42) (64 - 92)  BP: 123/69 (24 @ 05:42) (87/54 - 123/69)  RR: 18 (24 @ 04:15) (18 - 18)  SpO2: 96% (24 @ 04:15) (94% - 96%)          @ 07:01  -   @ 07:00  --------------------------------------------------------  IN: 490 mL / OUT: 1750 mL / NET: -1260 mL     @ 07:01  -   @ 06:52  --------------------------------------------------------  IN: 760 mL / OUT: 0 mL / NET: 760 mL    Daily     Daily Weight in k.6 (2024 08:36)    Pacing Wires        [ x ]   Settings: vvi 40                                 Isolated  [  ]        PHYSICAL EXAM:  Neurology: alert and oriented x 3, nonfocal, no gross deficits  CV : S1S2  Sternal Wound :  CDI , Stable  Lungs: cta  Abdomen: soft, nontender, nondistended, positive bowel sounds, last bowel movement         Extremities:     no edema no calf tenderness    acetaminophen     Tablet .. 650 milliGRAM(s) Oral every 6 hours PRN  ascorbic acid 500 milliGRAM(s) Oral two times a day  aspirin  chewable 81 milliGRAM(s) Oral daily  atorvastatin 40 milliGRAM(s) Oral at bedtime  bisacodyl Suppository 10 milliGRAM(s) Rectal once  chlorhexidine 2% Cloths 1 Application(s) Topical daily  enoxaparin Injectable 40 milliGRAM(s) SubCutaneous every 24 hours  metoprolol tartrate 25 milliGRAM(s) Oral two times a day  oxyCODONE    IR 5 milliGRAM(s) Oral every 4 hours PRN  oxyCODONE    IR 10 milliGRAM(s) Oral every 4 hours PRN  pantoprazole   Suspension 40 milliGRAM(s) Oral daily  polyethylene glycol 3350 17 Gram(s) Oral daily  senna 2 Tablet(s) Oral at bedtime  sodium chloride 0.9% lock flush 3 milliLiter(s) IV Push every 8 hours  sodium chloride 0.9%. 1000 milliLiter(s) IV Continuous <Continuous>      Physical Therapy Rec:   Home  [  ]   Home w/ PT  [  ]  Rehab  [  ]  Discussed with Cardiothoracic Team at AM rounds.

## 2024-06-10 LAB
ANION GAP SERPL CALC-SCNC: 11 MMOL/L — SIGNIFICANT CHANGE UP (ref 5–17)
BUN SERPL-MCNC: 9 MG/DL — SIGNIFICANT CHANGE UP (ref 7–23)
CALCIUM SERPL-MCNC: 9.7 MG/DL — SIGNIFICANT CHANGE UP (ref 8.4–10.5)
CHLORIDE SERPL-SCNC: 98 MMOL/L — SIGNIFICANT CHANGE UP (ref 96–108)
CO2 SERPL-SCNC: 24 MMOL/L — SIGNIFICANT CHANGE UP (ref 22–31)
CREAT SERPL-MCNC: 0.54 MG/DL — SIGNIFICANT CHANGE UP (ref 0.5–1.3)
EGFR: 97 ML/MIN/1.73M2 — SIGNIFICANT CHANGE UP
GLUCOSE SERPL-MCNC: 104 MG/DL — HIGH (ref 70–99)
HCT VFR BLD CALC: 28.5 % — LOW (ref 34.5–45)
HGB BLD-MCNC: 9.7 G/DL — LOW (ref 11.5–15.5)
MCHC RBC-ENTMCNC: 28.4 PG — SIGNIFICANT CHANGE UP (ref 27–34)
MCHC RBC-ENTMCNC: 34 GM/DL — SIGNIFICANT CHANGE UP (ref 32–36)
MCV RBC AUTO: 83.3 FL — SIGNIFICANT CHANGE UP (ref 80–100)
NRBC # BLD: 0 /100 WBCS — SIGNIFICANT CHANGE UP (ref 0–0)
PLATELET # BLD AUTO: 147 K/UL — LOW (ref 150–400)
POTASSIUM SERPL-MCNC: 4.4 MMOL/L — SIGNIFICANT CHANGE UP (ref 3.5–5.3)
POTASSIUM SERPL-SCNC: 4.4 MMOL/L — SIGNIFICANT CHANGE UP (ref 3.5–5.3)
RBC # BLD: 3.42 M/UL — LOW (ref 3.8–5.2)
RBC # FLD: 15.9 % — HIGH (ref 10.3–14.5)
SODIUM SERPL-SCNC: 133 MMOL/L — LOW (ref 135–145)
WBC # BLD: 5.94 K/UL — SIGNIFICANT CHANGE UP (ref 3.8–10.5)
WBC # FLD AUTO: 5.94 K/UL — SIGNIFICANT CHANGE UP (ref 3.8–10.5)

## 2024-06-10 RX ORDER — PANTOPRAZOLE SODIUM 20 MG/1
40 TABLET, DELAYED RELEASE ORAL
Refills: 0 | Status: DISCONTINUED | OUTPATIENT
Start: 2024-06-10 | End: 2024-06-12

## 2024-06-10 RX ADMIN — ATORVASTATIN CALCIUM 40 MILLIGRAM(S): 80 TABLET, FILM COATED ORAL at 21:50

## 2024-06-10 RX ADMIN — CHLORHEXIDINE GLUCONATE 1 APPLICATION(S): 213 SOLUTION TOPICAL at 11:14

## 2024-06-10 RX ADMIN — Medication 81 MILLIGRAM(S): at 11:57

## 2024-06-10 RX ADMIN — Medication 50 MILLIGRAM(S): at 05:09

## 2024-06-10 RX ADMIN — OXYCODONE HYDROCHLORIDE 5 MILLIGRAM(S): 5 TABLET ORAL at 23:00

## 2024-06-10 RX ADMIN — SODIUM CHLORIDE 3 MILLILITER(S): 9 INJECTION INTRAMUSCULAR; INTRAVENOUS; SUBCUTANEOUS at 05:11

## 2024-06-10 RX ADMIN — Medication 500 MILLIGRAM(S): at 05:08

## 2024-06-10 RX ADMIN — Medication 50 MILLIGRAM(S): at 17:29

## 2024-06-10 RX ADMIN — SODIUM CHLORIDE 3 MILLILITER(S): 9 INJECTION INTRAMUSCULAR; INTRAVENOUS; SUBCUTANEOUS at 13:57

## 2024-06-10 RX ADMIN — OXYCODONE HYDROCHLORIDE 5 MILLIGRAM(S): 5 TABLET ORAL at 08:39

## 2024-06-10 RX ADMIN — SENNA PLUS 2 TABLET(S): 8.6 TABLET ORAL at 21:50

## 2024-06-10 RX ADMIN — SODIUM CHLORIDE 3 MILLILITER(S): 9 INJECTION INTRAMUSCULAR; INTRAVENOUS; SUBCUTANEOUS at 23:34

## 2024-06-10 RX ADMIN — OXYCODONE HYDROCHLORIDE 5 MILLIGRAM(S): 5 TABLET ORAL at 09:09

## 2024-06-10 RX ADMIN — OXYCODONE HYDROCHLORIDE 5 MILLIGRAM(S): 5 TABLET ORAL at 22:00

## 2024-06-10 NOTE — PROGRESS NOTE ADULT - SUBJECTIVE AND OBJECTIVE BOX
VITAL SIGNS    Telemetry:  nsr 70    Vital Signs Last 24 Hrs  T(C): 37.4 (06-10-24 @ 04:33), Max: 37.4 (06-10-24 @ 04:33)  T(F): 99.3 (06-10-24 @ 04:33), Max: 99.3 (06-10-24 @ 04:33)  HR: 82 (06-10-24 @ 04:33) (82 - 99)  BP: 114/74 (06-10-24 @ 04:33) (114/74 - 147/82)  RR: 18 (06-10-24 @ 04:33) (18 - 18)  SpO2: 91% (06-10-24 @ 04:33) (91% - 98%)                    @ 07:01  -  06-10 @ 07:00  --------------------------------------------------------  IN: 720 mL / OUT: 1050 mL / NET: -330 mL          Daily     Daily Weight in k.8 (10 Remy 2024 07:26)            CAPILLARY BLOOD GLUCOSE                Drains:         Pacing Wires        [  ]   Settings:                                  Isolated  x[  ]    Coumadin    [ ] YES          [ x ]      NO                                   PHYSICAL EXAM        Neurology: alert and oriented x 3, nonfocal, no gross deficits  CV : s1 s2 RRR  Sternal Wound :  CDI , Stable  Lungs: cta  Abdomen: soft, nontender, nondistended, positive bowel sounds, last bowel movement  +                        :    voiding        Extremities:   trace    edema   /  -   calve tenderness ,             acetaminophen     Tablet .. 650 milliGRAM(s) Oral every 6 hours PRN  aspirin  chewable 81 milliGRAM(s) Oral daily  atorvastatin 40 milliGRAM(s) Oral at bedtime  bisacodyl Suppository 10 milliGRAM(s) Rectal once  chlorhexidine 2% Cloths 1 Application(s) Topical daily  enoxaparin Injectable 40 milliGRAM(s) SubCutaneous every 24 hours  metoprolol tartrate 50 milliGRAM(s) Oral two times a day  oxyCODONE    IR 5 milliGRAM(s) Oral every 4 hours PRN  oxyCODONE    IR 10 milliGRAM(s) Oral every 4 hours PRN  pantoprazole   Suspension 40 milliGRAM(s) Oral daily  polyethylene glycol 3350 17 Gram(s) Oral daily  senna 2 Tablet(s) Oral at bedtime  sodium chloride 0.9% lock flush 3 milliLiter(s) IV Push every 8 hours                    Physical Therapy Rec:   Home  [  ]   Home w/ PT  [  ]  Rehab  [  ]  Discussed with Cardiothoracic Team at AM rounds.

## 2024-06-10 NOTE — PROGRESS NOTE ADULT - ASSESSMENT
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 Pt is scheduled for aortic valve replacement with Dr. Sandeep Jj on 6/3/24. Per pt, she was hospitalized in November due to syncope from vomiting. During her hospitalization she had an echocardiogram that showed severe aortic regurgitation Pt reports palpitations lasting less than a minute before spontaneously resolving, denies triggers and left sided chest pressure. Pt reports plans for cardiac catheterization at [facility] on 5/31/24. After examiation with Dr. Jj, the above surgery was recommended.     S/P 6/5/24 AVR (t)/ 2 units prbc given intraop  postop extubated 6/5  + pressor support/ insulin gttp for glycemic control  sb in ctu- amio ERP d/c as per Dr. Jj  6/7 vss; rsr 60-70; rt femoral deyvi d/c- bedrest til 1300; tx floor; + epicardial pw - VVI 40; pain management  6/8 vss; rsr ; hypotension noted this am--> albumin given; diuretics and norvasac d/c; lop decreased 25 po bid; bp responded   6/9 VSS will isolate pw today -  discharge planning- home when stable   6/10 d/c pw, ambulation  f/u orthostatic bp's

## 2024-06-11 ENCOUNTER — RESULT REVIEW (OUTPATIENT)
Age: 74
End: 2024-06-11

## 2024-06-11 LAB
ALBUMIN SERPL ELPH-MCNC: 3.8 G/DL — SIGNIFICANT CHANGE UP (ref 3.3–5)
ALP SERPL-CCNC: 150 U/L — HIGH (ref 40–120)
ALT FLD-CCNC: 79 U/L — HIGH (ref 10–45)
ANION GAP SERPL CALC-SCNC: 10 MMOL/L — SIGNIFICANT CHANGE UP (ref 5–17)
AST SERPL-CCNC: 63 U/L — HIGH (ref 10–40)
BASOPHILS # BLD AUTO: 0.05 K/UL — SIGNIFICANT CHANGE UP (ref 0–0.2)
BASOPHILS NFR BLD AUTO: 0.8 % — SIGNIFICANT CHANGE UP (ref 0–2)
BILIRUB SERPL-MCNC: 0.8 MG/DL — SIGNIFICANT CHANGE UP (ref 0.2–1.2)
BUN SERPL-MCNC: 13 MG/DL — SIGNIFICANT CHANGE UP (ref 7–23)
CALCIUM SERPL-MCNC: 9.5 MG/DL — SIGNIFICANT CHANGE UP (ref 8.4–10.5)
CHLORIDE SERPL-SCNC: 100 MMOL/L — SIGNIFICANT CHANGE UP (ref 96–108)
CO2 SERPL-SCNC: 25 MMOL/L — SIGNIFICANT CHANGE UP (ref 22–31)
CREAT SERPL-MCNC: 0.64 MG/DL — SIGNIFICANT CHANGE UP (ref 0.5–1.3)
EGFR: 93 ML/MIN/1.73M2 — SIGNIFICANT CHANGE UP
EOSINOPHIL # BLD AUTO: 0.29 K/UL — SIGNIFICANT CHANGE UP (ref 0–0.5)
EOSINOPHIL NFR BLD AUTO: 4.6 % — SIGNIFICANT CHANGE UP (ref 0–6)
GLUCOSE SERPL-MCNC: 105 MG/DL — HIGH (ref 70–99)
HCT VFR BLD CALC: 27.6 % — LOW (ref 34.5–45)
HGB BLD-MCNC: 9.1 G/DL — LOW (ref 11.5–15.5)
IMM GRANULOCYTES NFR BLD AUTO: 0.9 % — SIGNIFICANT CHANGE UP (ref 0–0.9)
LYMPHOCYTES # BLD AUTO: 1.48 K/UL — SIGNIFICANT CHANGE UP (ref 1–3.3)
LYMPHOCYTES # BLD AUTO: 23.2 % — SIGNIFICANT CHANGE UP (ref 13–44)
MAGNESIUM SERPL-MCNC: 2 MG/DL — SIGNIFICANT CHANGE UP (ref 1.6–2.6)
MCHC RBC-ENTMCNC: 28.1 PG — SIGNIFICANT CHANGE UP (ref 27–34)
MCHC RBC-ENTMCNC: 33 GM/DL — SIGNIFICANT CHANGE UP (ref 32–36)
MCV RBC AUTO: 85.2 FL — SIGNIFICANT CHANGE UP (ref 80–100)
MONOCYTES # BLD AUTO: 0.83 K/UL — SIGNIFICANT CHANGE UP (ref 0–0.9)
MONOCYTES NFR BLD AUTO: 13 % — SIGNIFICANT CHANGE UP (ref 2–14)
NEUTROPHILS # BLD AUTO: 3.66 K/UL — SIGNIFICANT CHANGE UP (ref 1.8–7.4)
NEUTROPHILS NFR BLD AUTO: 57.5 % — SIGNIFICANT CHANGE UP (ref 43–77)
NRBC # BLD: 0 /100 WBCS — SIGNIFICANT CHANGE UP (ref 0–0)
PHOSPHATE SERPL-MCNC: 4.4 MG/DL — SIGNIFICANT CHANGE UP (ref 2.5–4.5)
PLATELET # BLD AUTO: 168 K/UL — SIGNIFICANT CHANGE UP (ref 150–400)
POTASSIUM SERPL-MCNC: 5 MMOL/L — SIGNIFICANT CHANGE UP (ref 3.5–5.3)
POTASSIUM SERPL-SCNC: 5 MMOL/L — SIGNIFICANT CHANGE UP (ref 3.5–5.3)
PROT SERPL-MCNC: 6.4 G/DL — SIGNIFICANT CHANGE UP (ref 6–8.3)
RBC # BLD: 3.24 M/UL — LOW (ref 3.8–5.2)
RBC # FLD: 16.1 % — HIGH (ref 10.3–14.5)
SODIUM SERPL-SCNC: 135 MMOL/L — SIGNIFICANT CHANGE UP (ref 135–145)
SURGICAL PATHOLOGY STUDY: SIGNIFICANT CHANGE UP
WBC # BLD: 6.37 K/UL — SIGNIFICANT CHANGE UP (ref 3.8–10.5)
WBC # FLD AUTO: 6.37 K/UL — SIGNIFICANT CHANGE UP (ref 3.8–10.5)

## 2024-06-11 PROCEDURE — 93306 TTE W/DOPPLER COMPLETE: CPT | Mod: 26

## 2024-06-11 RX ORDER — METOPROLOL TARTRATE 50 MG
50 TABLET ORAL EVERY 12 HOURS
Refills: 0 | Status: DISCONTINUED | OUTPATIENT
Start: 2024-06-11 | End: 2024-06-12

## 2024-06-11 RX ADMIN — Medication 50 MILLIGRAM(S): at 17:04

## 2024-06-11 RX ADMIN — PANTOPRAZOLE SODIUM 40 MILLIGRAM(S): 20 TABLET, DELAYED RELEASE ORAL at 05:27

## 2024-06-11 RX ADMIN — SENNA PLUS 2 TABLET(S): 8.6 TABLET ORAL at 22:35

## 2024-06-11 RX ADMIN — Medication 81 MILLIGRAM(S): at 10:45

## 2024-06-11 RX ADMIN — SODIUM CHLORIDE 3 MILLILITER(S): 9 INJECTION INTRAMUSCULAR; INTRAVENOUS; SUBCUTANEOUS at 14:50

## 2024-06-11 RX ADMIN — CHLORHEXIDINE GLUCONATE 1 APPLICATION(S): 213 SOLUTION TOPICAL at 14:50

## 2024-06-11 RX ADMIN — SODIUM CHLORIDE 3 MILLILITER(S): 9 INJECTION INTRAMUSCULAR; INTRAVENOUS; SUBCUTANEOUS at 06:46

## 2024-06-11 RX ADMIN — Medication 50 MILLIGRAM(S): at 05:27

## 2024-06-11 RX ADMIN — ENOXAPARIN SODIUM 40 MILLIGRAM(S): 100 INJECTION SUBCUTANEOUS at 10:45

## 2024-06-11 RX ADMIN — SODIUM CHLORIDE 3 MILLILITER(S): 9 INJECTION INTRAMUSCULAR; INTRAVENOUS; SUBCUTANEOUS at 22:36

## 2024-06-11 NOTE — PROGRESS NOTE ADULT - PROBLEM SELECTOR PLAN 1
continue postop care  continue asa and statin  decrease lop 25 mg po bid- hypotensive this am  d/c norvasc and diuretics secondary to hypotension this am   + epicardial pw - VVI 40   pulm toilet/ wean O2 as tolerated   pain management  increase activity as tolerated  bowel regimen  discharge planning- home when stable continue postop care  continue asa and statin    d/c norvasc and diuretics secondary to hypotension   + epicardial pw - VVI 40   pulm toilet/ wean O2 as tolerated   pain management  increase activity as tolerated  bowel regimen  discharge planning- home when stable

## 2024-06-11 NOTE — PROGRESS NOTE ADULT - ASSESSMENT
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 Pt is scheduled for aortic valve replacement with Dr. Sandeep Jj on 6/3/24. Per pt, she was hospitalized in November due to syncope from vomiting. During her hospitalization she had an echocardiogram that showed severe aortic regurgitation Pt reports palpitations lasting less than a minute before spontaneously resolving, denies triggers and left sided chest pressure. Pt reports plans for cardiac catheterization at [facility] on 5/31/24. After examiation with Dr. Jj, the above surgery was recommended.     S/P 6/5/24 AVR (t)/ 2 units prbc given intraop  postop extubated 6/5  + pressor support/ insulin gttp for glycemic control  sb in ctu- amio ERP d/c as per Dr. Jj  6/7 vss; rsr 60-70; rt femoral deyvi d/c- bedrest til 1300; tx floor; + epicardial pw - VVI 40; pain management  6/8 vss; rsr ; hypotension noted this am--> albumin given; diuretics and norvasac d/c; lop decreased 25 po bid; bp responded   6/9 VSS will isolate pw today -  discharge planning- home when stable   6/10 d/c pw, ambulation  f/u orthostatic bp's  6/11  Brief PAT overnight, on lopressor 50 bid.    Non orthostatic.  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 Pt is scheduled for aortic valve replacement with Dr. Sandeep Jj on 6/3/24. Per pt, she was hospitalized in November due to syncope from vomiting. During her hospitalization she had an echocardiogram that showed severe aortic regurgitation Pt reports palpitations lasting less than a minute before spontaneously resolving, denies triggers and left sided chest pressure. Pt reports plans for cardiac catheterization at [facility] on 5/31/24. After examiation with Dr. Jj, the above surgery was recommended.     S/P 6/5/24 AVR (t)/ 2 units prbc given intraop  postop extubated 6/5  + pressor support/ insulin gttp for glycemic control  sb in ctu- amio ERP d/c as per Dr. Jj  6/7 vss; rsr 60-70; rt femoral deyvi d/c- bedrest til 1300; tx floor; + epicardial pw - VVI 40; pain management  6/8 vss; rsr ; hypotension noted this am--> albumin given; diuretics and norvasac d/c; lop decreased 25 po bid; bp responded   6/9 VSS will isolate pw today -  discharge planning- home when stable   6/10 d/c pw, ambulation  f/u orthostatic bp's  6/11  Brief PAT overnight, on lopressor 50 bid, change to toprol xl 50bid.  LFTS up, atorv d/c  Non orthostatic.  TTE today , d/c home tomorrow

## 2024-06-11 NOTE — PROGRESS NOTE ADULT - SUBJECTIVE AND OBJECTIVE BOX
VITAL SIGNS    Telemetry:  nsr     Vital Signs Last 24 Hrs  T(C): 36.9 (06-10-24 @ 20:02), Max: 37.1 (06-10-24 @ 11:48)  T(F): 98.4 (06-10-24 @ 20:02), Max: 98.8 (06-10-24 @ 11:48)  HR: 86 (06-11-24 @ 05:02) (86 - 102)  BP: 100/62 (06-10-24 @ 20:02) (92/60 - 100/62)  RR: 18 (06-10-24 @ 20:02) (18 - 18)  SpO2: 91% (06-11-24 @ 05:02) (91% - 94%)                   06-10 @ 07:01  -  06-11 @ 07:00  --------------------------------------------------------  IN: 760 mL / OUT: 1000 mL / NET: -240 mL          Daily     Daily             CAPILLARY BLOOD GLUCOSE                Drains:       Pacing Wires          Coumadin    [ ] YES          [x  ]      NO                                   PHYSICAL EXAM        Neurology: alert and oriented x 3, nonfocal, no gross deficits  CV : s1 s2 RRR  Sternal Wound :  CDI , Stable  Lungs: cta  Abdomen: soft, nontender, nondistended, positive bowel sounds, last bowel movement +                     :    voiding         Extremities:    trace   edema   /  -   calve tenderness ,             acetaminophen     Tablet .. 650 milliGRAM(s) Oral every 6 hours PRN  aspirin  chewable 81 milliGRAM(s) Oral daily  atorvastatin 40 milliGRAM(s) Oral at bedtime  bisacodyl Suppository 10 milliGRAM(s) Rectal once  chlorhexidine 2% Cloths 1 Application(s) Topical daily  enoxaparin Injectable 40 milliGRAM(s) SubCutaneous every 24 hours  metoprolol tartrate 50 milliGRAM(s) Oral two times a day  oxyCODONE    IR 5 milliGRAM(s) Oral every 4 hours PRN  oxyCODONE    IR 10 milliGRAM(s) Oral every 4 hours PRN  pantoprazole    Tablet 40 milliGRAM(s) Oral before breakfast  polyethylene glycol 3350 17 Gram(s) Oral daily  senna 2 Tablet(s) Oral at bedtime  sodium chloride 0.9% lock flush 3 milliLiter(s) IV Push every 8 hours                    Physical Therapy Rec:   Home  [  ]   Home w/ PT  [  ]  Rehab  [  ]  Discussed with Cardiothoracic Team at AM rounds.

## 2024-06-11 NOTE — PROGRESS NOTE ADULT - PROBLEM SELECTOR PLAN 2
continue lop 25 mg po  hypotension noted this am--> albumin given; diuretics and norvasac d/c; lop decreased 25 po bid; bp responded  monitor VS hypotension noted th--> albumin given; diuretics and norvasac d/c; lop decreased 25 po bid; bp responded--stable , change to toprol xl 50bid  monitor VS

## 2024-06-12 ENCOUNTER — TRANSCRIPTION ENCOUNTER (OUTPATIENT)
Age: 74
End: 2024-06-12

## 2024-06-12 VITALS — WEIGHT: 106.7 LBS

## 2024-06-12 LAB
ALBUMIN SERPL ELPH-MCNC: 3.6 G/DL — SIGNIFICANT CHANGE UP (ref 3.3–5)
ALP SERPL-CCNC: 156 U/L — HIGH (ref 40–120)
ALT FLD-CCNC: 60 U/L — HIGH (ref 10–45)
ANION GAP SERPL CALC-SCNC: 12 MMOL/L — SIGNIFICANT CHANGE UP (ref 5–17)
AST SERPL-CCNC: 32 U/L — SIGNIFICANT CHANGE UP (ref 10–40)
BASOPHILS # BLD AUTO: 0.04 K/UL — SIGNIFICANT CHANGE UP (ref 0–0.2)
BASOPHILS NFR BLD AUTO: 0.6 % — SIGNIFICANT CHANGE UP (ref 0–2)
BILIRUB SERPL-MCNC: 0.7 MG/DL — SIGNIFICANT CHANGE UP (ref 0.2–1.2)
BUN SERPL-MCNC: 11 MG/DL — SIGNIFICANT CHANGE UP (ref 7–23)
CALCIUM SERPL-MCNC: 9.7 MG/DL — SIGNIFICANT CHANGE UP (ref 8.4–10.5)
CHLORIDE SERPL-SCNC: 101 MMOL/L — SIGNIFICANT CHANGE UP (ref 96–108)
CO2 SERPL-SCNC: 22 MMOL/L — SIGNIFICANT CHANGE UP (ref 22–31)
CREAT SERPL-MCNC: 0.54 MG/DL — SIGNIFICANT CHANGE UP (ref 0.5–1.3)
EGFR: 97 ML/MIN/1.73M2 — SIGNIFICANT CHANGE UP
EOSINOPHIL # BLD AUTO: 0.23 K/UL — SIGNIFICANT CHANGE UP (ref 0–0.5)
EOSINOPHIL NFR BLD AUTO: 3.5 % — SIGNIFICANT CHANGE UP (ref 0–6)
GLUCOSE SERPL-MCNC: 108 MG/DL — HIGH (ref 70–99)
HCT VFR BLD CALC: 28.7 % — LOW (ref 34.5–45)
HGB BLD-MCNC: 9.4 G/DL — LOW (ref 11.5–15.5)
IMM GRANULOCYTES NFR BLD AUTO: 0.6 % — SIGNIFICANT CHANGE UP (ref 0–0.9)
LYMPHOCYTES # BLD AUTO: 1.19 K/UL — SIGNIFICANT CHANGE UP (ref 1–3.3)
LYMPHOCYTES # BLD AUTO: 18.3 % — SIGNIFICANT CHANGE UP (ref 13–44)
MAGNESIUM SERPL-MCNC: 1.9 MG/DL — SIGNIFICANT CHANGE UP (ref 1.6–2.6)
MCHC RBC-ENTMCNC: 27.8 PG — SIGNIFICANT CHANGE UP (ref 27–34)
MCHC RBC-ENTMCNC: 32.8 GM/DL — SIGNIFICANT CHANGE UP (ref 32–36)
MCV RBC AUTO: 84.9 FL — SIGNIFICANT CHANGE UP (ref 80–100)
MONOCYTES # BLD AUTO: 0.7 K/UL — SIGNIFICANT CHANGE UP (ref 0–0.9)
MONOCYTES NFR BLD AUTO: 10.7 % — SIGNIFICANT CHANGE UP (ref 2–14)
NEUTROPHILS # BLD AUTO: 4.32 K/UL — SIGNIFICANT CHANGE UP (ref 1.8–7.4)
NEUTROPHILS NFR BLD AUTO: 66.3 % — SIGNIFICANT CHANGE UP (ref 43–77)
NRBC # BLD: 0 /100 WBCS — SIGNIFICANT CHANGE UP (ref 0–0)
PLATELET # BLD AUTO: 197 K/UL — SIGNIFICANT CHANGE UP (ref 150–400)
POTASSIUM SERPL-MCNC: 4.1 MMOL/L — SIGNIFICANT CHANGE UP (ref 3.5–5.3)
POTASSIUM SERPL-SCNC: 4.1 MMOL/L — SIGNIFICANT CHANGE UP (ref 3.5–5.3)
PROT SERPL-MCNC: 6.4 G/DL — SIGNIFICANT CHANGE UP (ref 6–8.3)
RBC # BLD: 3.38 M/UL — LOW (ref 3.8–5.2)
RBC # FLD: 15.9 % — HIGH (ref 10.3–14.5)
SODIUM SERPL-SCNC: 135 MMOL/L — SIGNIFICANT CHANGE UP (ref 135–145)
WBC # BLD: 6.52 K/UL — SIGNIFICANT CHANGE UP (ref 3.8–10.5)
WBC # FLD AUTO: 6.52 K/UL — SIGNIFICANT CHANGE UP (ref 3.8–10.5)

## 2024-06-12 PROCEDURE — 83605 ASSAY OF LACTIC ACID: CPT

## 2024-06-12 PROCEDURE — P9100: CPT

## 2024-06-12 PROCEDURE — 85027 COMPLETE CBC AUTOMATED: CPT

## 2024-06-12 PROCEDURE — 85018 HEMOGLOBIN: CPT

## 2024-06-12 PROCEDURE — P9047: CPT

## 2024-06-12 PROCEDURE — P9037: CPT

## 2024-06-12 PROCEDURE — 85520 HEPARIN ASSAY: CPT

## 2024-06-12 PROCEDURE — 80048 BASIC METABOLIC PNL TOTAL CA: CPT

## 2024-06-12 PROCEDURE — 82550 ASSAY OF CK (CPK): CPT

## 2024-06-12 PROCEDURE — 82803 BLOOD GASES ANY COMBINATION: CPT

## 2024-06-12 PROCEDURE — 88305 TISSUE EXAM BY PATHOLOGIST: CPT

## 2024-06-12 PROCEDURE — 83735 ASSAY OF MAGNESIUM: CPT

## 2024-06-12 PROCEDURE — 84443 ASSAY THYROID STIM HORMONE: CPT

## 2024-06-12 PROCEDURE — 84132 ASSAY OF SERUM POTASSIUM: CPT

## 2024-06-12 PROCEDURE — 82962 GLUCOSE BLOOD TEST: CPT

## 2024-06-12 PROCEDURE — 85610 PROTHROMBIN TIME: CPT

## 2024-06-12 PROCEDURE — 84100 ASSAY OF PHOSPHORUS: CPT

## 2024-06-12 PROCEDURE — 84436 ASSAY OF TOTAL THYROXINE: CPT

## 2024-06-12 PROCEDURE — 86923 COMPATIBILITY TEST ELECTRIC: CPT

## 2024-06-12 PROCEDURE — 97530 THERAPEUTIC ACTIVITIES: CPT

## 2024-06-12 PROCEDURE — 86891 AUTOLOGOUS BLOOD OP SALVAGE: CPT

## 2024-06-12 PROCEDURE — 85025 COMPLETE CBC W/AUTO DIFF WBC: CPT

## 2024-06-12 PROCEDURE — 85014 HEMATOCRIT: CPT

## 2024-06-12 PROCEDURE — 36415 COLL VENOUS BLD VENIPUNCTURE: CPT

## 2024-06-12 PROCEDURE — 71045 X-RAY EXAM CHEST 1 VIEW: CPT

## 2024-06-12 PROCEDURE — C1889: CPT

## 2024-06-12 PROCEDURE — C8929: CPT

## 2024-06-12 PROCEDURE — 84439 ASSAY OF FREE THYROXINE: CPT

## 2024-06-12 PROCEDURE — 71045 X-RAY EXAM CHEST 1 VIEW: CPT | Mod: 26

## 2024-06-12 PROCEDURE — 84484 ASSAY OF TROPONIN QUANT: CPT

## 2024-06-12 PROCEDURE — 94002 VENT MGMT INPAT INIT DAY: CPT

## 2024-06-12 PROCEDURE — 97161 PT EVAL LOW COMPLEX 20 MIN: CPT

## 2024-06-12 PROCEDURE — 82330 ASSAY OF CALCIUM: CPT

## 2024-06-12 PROCEDURE — 82435 ASSAY OF BLOOD CHLORIDE: CPT

## 2024-06-12 PROCEDURE — 97116 GAIT TRAINING THERAPY: CPT

## 2024-06-12 PROCEDURE — 84295 ASSAY OF SERUM SODIUM: CPT

## 2024-06-12 PROCEDURE — P9045: CPT

## 2024-06-12 PROCEDURE — 80053 COMPREHEN METABOLIC PANEL: CPT

## 2024-06-12 PROCEDURE — 84480 ASSAY TRIIODOTHYRONINE (T3): CPT

## 2024-06-12 PROCEDURE — 97165 OT EVAL LOW COMPLEX 30 MIN: CPT

## 2024-06-12 PROCEDURE — P9012: CPT

## 2024-06-12 PROCEDURE — 85384 FIBRINOGEN ACTIVITY: CPT

## 2024-06-12 PROCEDURE — 85730 THROMBOPLASTIN TIME PARTIAL: CPT

## 2024-06-12 PROCEDURE — P9016: CPT

## 2024-06-12 PROCEDURE — C1769: CPT

## 2024-06-12 PROCEDURE — 86965 POOLING BLOOD PLATELETS: CPT

## 2024-06-12 PROCEDURE — C1751: CPT

## 2024-06-12 PROCEDURE — 82947 ASSAY GLUCOSE BLOOD QUANT: CPT

## 2024-06-12 PROCEDURE — 93005 ELECTROCARDIOGRAM TRACING: CPT

## 2024-06-12 PROCEDURE — 82553 CREATINE MB FRACTION: CPT

## 2024-06-12 RX ORDER — AMLODIPINE BESYLATE 2.5 MG/1
1 TABLET ORAL
Qty: 0 | Refills: 0 | DISCHARGE

## 2024-06-12 RX ORDER — ACETAMINOPHEN 500 MG
2 TABLET ORAL
Qty: 0 | Refills: 0 | DISCHARGE

## 2024-06-12 RX ORDER — METOPROLOL TARTRATE 50 MG
1 TABLET ORAL
Qty: 60 | Refills: 1
Start: 2024-06-12 | End: 2024-08-10

## 2024-06-12 RX ORDER — LISINOPRIL 2.5 MG/1
1 TABLET ORAL
Refills: 0 | DISCHARGE

## 2024-06-12 RX ORDER — METOPROLOL TARTRATE 50 MG
1 TABLET ORAL
Qty: 0 | Refills: 0 | DISCHARGE

## 2024-06-12 RX ADMIN — Medication 50 MILLIGRAM(S): at 05:38

## 2024-06-12 RX ADMIN — Medication 81 MILLIGRAM(S): at 11:29

## 2024-06-12 RX ADMIN — SODIUM CHLORIDE 3 MILLILITER(S): 9 INJECTION INTRAMUSCULAR; INTRAVENOUS; SUBCUTANEOUS at 05:35

## 2024-06-12 RX ADMIN — PANTOPRAZOLE SODIUM 40 MILLIGRAM(S): 20 TABLET, DELAYED RELEASE ORAL at 05:38

## 2024-06-12 NOTE — DISCHARGE NOTE PROVIDER - CARE PROVIDER_API CALL
Sandeep Jj  Thoracic and Cardiac Surgery  [address] [place] [id number]  Phone: [phone]  Fax: [phone]  Scheduled Appointment: 06/20/2024 01:30 PM

## 2024-06-12 NOTE — PROGRESS NOTE ADULT - ASSESSMENT
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 Pt is scheduled for aortic valve replacement with Dr. Sandeep Jj on 6/3/24. Per pt, she was hospitalized in November due to syncope from vomiting. During her hospitalization she had an echocardiogram that showed severe aortic regurgitation Pt reports palpitations lasting less than a minute before spontaneously resolving, denies triggers and left sided chest pressure. Pt reports plans for cardiac catheterization at [facility] on 5/31/24. After examiation with Dr. Jj, the above surgery was recommended.     S/P 6/5/24 AVR (t)/ 2 units prbc given intraop  postop extubated 6/5  + pressor support/ insulin gttp for glycemic control  sb in ctu- amio ERP d/c as per Dr. Jj  6/7 vss; rsr 60-70; rt femoral deyvi d/c- bedrest til 1300; tx floor; + epicardial pw - VVI 40; pain management  6/8 vss; rsr ; hypotension noted this am--> albumin given; diuretics and norvasac d/c; lop decreased 25 po bid; bp responded   6/9 VSS will isolate pw today -  discharge planning- home when stable   6/10 d/c pw, ambulation  f/u orthostatic bp's  6/11  Brief PAT overnight, on lopressor 50 bid, change to toprol xl 50bid.  LFTS up, atorv d/c  Non orthostatic.  TTE today , d/c home tomorrow  6/12 DC home

## 2024-06-12 NOTE — PROGRESS NOTE ADULT - PROVIDER SPECIALTY LIST ADULT
CT Surgery
Critical Care
CT Surgery

## 2024-06-12 NOTE — DISCHARGE NOTE PROVIDER - NSDCACTIVITY_GEN_ALL_CORE
Bathing allowed/Do not drive or operate machinery/Showering allowed/Stairs allowed/Walking - Indoors allowed/No heavy lifting/straining/Walking - Outdoors allowed/Follow Instructions Provided by your Surgical Team/Activity as tolerated

## 2024-06-12 NOTE — DISCHARGE NOTE NURSING/CASE MANAGEMENT/SOCIAL WORK - NSDCPEFALRISK_GEN_ALL_CORE
For information on Fall & Injury Prevention, visit: [email or web address] OR  [email or web address] OR  https://www.cdc.gov/steadi/patient.html

## 2024-06-12 NOTE — DISCHARGE NOTE PROVIDER - NSDCFUADDAPPT_GEN_ALL_CORE_FT
Please follow up with Dr. Jj June 20 at 130 pm> Call Dorothy Lewis   any questions regarding appt> [phone]  Cardiac surgery office at [facility] entrance 3  first floor on left after entering lobby  Office telephone     Your Care Navigator Nurse Practitioner will be in touch to see you in your home within a few days from discharge. The Follow Your Heart program can help ensure you understand your medications, discharge instructions and answer any questions you may have at that time. They are also a great source to address concerns during the day and may be reached at [phone].

## 2024-06-12 NOTE — DISCHARGE NOTE PROVIDER - NSDCFUSCHEDAPPT_GEN_ALL_CORE_FT
[facility]  CARDIOLOGY [address]  Scheduled Appointment: 06/13/2024    Sandeep Jj  [facility]  CTSURG [address]  Scheduled Appointment: 06/20/2024

## 2024-06-12 NOTE — PROGRESS NOTE ADULT - PROBLEM SELECTOR PLAN 1
continue postop care  continue asa and statin  d/c norvasc and diuretics secondary to hypotension   pulm toilet/ wean O2 as tolerated   pain management  increase activity as tolerated  bowel regimen  discharge planning- home when stable

## 2024-06-12 NOTE — DISCHARGE NOTE PROVIDER - NSDCPNSUBOBJ_GEN_ALL_CORE
Progress Note:   · Provider Specialty  CT Surgery    Reason for Admission:   Reason for Admission:  · Reason for Admission  AVR      · Subjective and Objective:   Subjective: "Hello"  OOB chair      Tele:  SR 70-80           V/S:                    T(F): 98.6 (24 @ 04:51), Max: 99 (24 @ 19:32)  HR: 88 (24 @ 08:08) (60 - 101)  BP: 115/76 (24 @ 04:51) (99/64 - 115/76)  RR: 18 (24 @ 04:51) (18 - 18)  SpO2: 92% (24 @ 04:51) (92% - 94%)  Wt(kg): --      LV EF:      Labs:      135  |  101  |  11  ----------------------------<  108<H>  4.1   |  22  |  0.54    Ca    9.7      2024 06:28  Phos  4.4       Mg     1.9         TPro  6.4  /  Alb  3.6  /  TBili  0.7  /  DBili  x   /  AST  32  /  ALT  60<H>  /  AlkPhos  156<H>                                 9.4    6.52  )-----------( 197      ( 2024 06:28 )             28.7             CAPILLARY BLOOD GLUCOSE               CXR:    I&O's Detail    2024 07:01  -  2024 07:00  --------------------------------------------------------  IN:    Oral Fluid: 240 mL  Total IN: 240 mL    OUT:  Total OUT: 0 mL    Total NET: 240 mL      2024 07:01  -  2024 08:44  --------------------------------------------------------  IN:    Oral Fluid: 240 mL  Total IN: 240 mL    OUT:  Total OUT: 0 mL    Total NET: 240 mL      BOWEL MOVEMENT:  [ x] YES [ ] NO      Daily     Daily Weight in k.5 (2024 08:45)  Medications:  acetaminophen     Tablet .. 650 milliGRAM(s) Oral every 6 hours PRN  aspirin  chewable 81 milliGRAM(s) Oral daily  bisacodyl Suppository 10 milliGRAM(s) Rectal once  metoprolol succinate ER 50 milliGRAM(s) Oral every 12 hours  oxyCODONE    IR 5 milliGRAM(s) Oral every 4 hours PRN  pantoprazole    Tablet 40 milliGRAM(s) Oral before breakfast  polyethylene glycol 3350 17 Gram(s) Oral daily  senna 2 Tablet(s) Oral at bedtime  sodium chloride 0.9% lock flush 3 milliLiter(s) IV Push every 8 hours        Physical Exam:    Neurology: alert and oriented x 3,   CV : s1 s2 RRR  Sternal Wound :  CDI , Stable  Lungs: cta  Abdomen: soft, nontender, nondistended, positive bowel sounds, last bowel movement this am       :    voiding         Extremities:    trace   edema   /  -   calve tenderness ,               PAST MEDICAL & SURGICAL HISTORY:  SCC (Squamous Cell Carcinoma) (ICD9 173.9)  Head and neck  treated with Chem/Radiation/ LN removal      Chronic Gastritis (ICD9 535.10)      Uterine Cancer (ICD9 179)  no chemo no radiation      HTN - Hypertension      Dyslipidemia (ICD9 272.4)      H/O: Osteoarthritis (ICD9 V13.4)      Chest Pain (ICD9 786.50)      CAD (coronary artery disease)      History of PSVT (paroxysmal supraventricular tachycardia)      Incisional hernia      Kidney stone      Prediabetes      History of Cholecystectomy (ICD9 V45.79)        History of Tonsillectomy (ICD9 V45.89)      H/O: Hysterectomy (ICD9 V88.01)  at age 32      Lymph Node Cancer (ICD9 196.9)  removal of cervical LN due to head & neck squamous cell carcinoma ;       S/P cardiac cath                        Assessment and Plan:   · Assessment     74 y.o female with PMHx of HTN, HLD, aortic regurgitation, SVT, non-obstructive CAD, uterine cancer s/p hysterectomy prediabetes, gastritis, and 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 Pt is scheduled for aortic valve replacement with Dr. Sandeep Jj on 6/3/24. Per pt, she was hospitalized in November due to syncope from vomiting. During her hospitalization she had an echocardiogram that showed severe aortic regurgitation Pt reports palpitations lasting less than a minute before spontaneously resolving, denies triggers and left sided chest pressure. Pt reports plans for cardiac catheterization at [facility] on 24. After examiation with Dr. Jj, the above surgery was recommended.     S/P 24 AVR (t)/ 2 units prbc given intraop  postop extubated   + pressor support/ insulin gttp for glycemic control  sb in ctu- amio ERP d/c as per Dr. Jj   vss; rsr 60-70; rt femoral deyvi d/c- bedrest til 1300; tx floor; + epicardial pw - VVI 40; pain management   vss; rsr ; hypotension noted this am--> albumin given; diuretics and norvasac d/c; lop decreased 25 po bid; bp responded    VSS will isolate pw today -  discharge planning- home when stable   6/10 d/c pw, ambulation  f/u orthostatic bp's    Brief PAT overnight, on lopressor 50 bid, change to toprol xl 50bid.  LFTS up, atorv d/c  Non orthostatic.  TTE today , d/c home tomorrow   DC home        Problem/Plan - 1:  ·  Problem: S/P AVR (aortic valve replacement).   ·  Plan: continue postop care  continue asa and statin  d/c norvasc and diuretics secondary to hypotension   pulm toilet/ wean O2 as tolerated   pain management  increase activity as tolerated  bowel regimen  discharge planning- home when stable.    Problem/Plan - 2:  ·  Problem: HTN (hypertension).   ·  Plan: hypotension noted th--> albumin given; diuretics and norvasac d/c;   toprol xl 50bid  monitor VS.    Problem/Plan - 3:  ·  Problem: HLD (hyperlipidemia).   ·  Plan: on hold for rising lfts'.      Electronic Signatures:  Pat Mcmullen (NP)  (Signed 2024 08:46)  	Authored: Progress Note, Reason for Admission, Subjective and Objective, Assessment and Plan      Last Updated: 2024 08:46 by Pat Mcmullen (CECILE)

## 2024-06-12 NOTE — DISCHARGE NOTE NURSING/CASE MANAGEMENT/SOCIAL WORK - NSDCFUADDAPPT_GEN_ALL_CORE_FT
Please follow up with Dr. Jj June 20 at 130 pm> Call Dorothy Lewis   any questions regarding appt> [phone]  Cardiac surgery office at [facility] entrance 3  first floor on left after entering lobby  Office telephone     Your Care Navigator Nurse Practitioner will be in touch to see you in your home within a few days from discharge. The Follow Your Heart program can help ensure you understand your medications, discharge instructions and answer any questions you may have at that time. They are also a great source to address concerns during the day and may be reached at [phone].

## 2024-06-12 NOTE — PROGRESS NOTE ADULT - SUBJECTIVE AND OBJECTIVE BOX
Subjective: "Hello"  OOB chair      Tele:  SR 70-80           V/S:                    T(F): 98.6 (24 @ 04:51), Max: 99 (24 @ 19:32)  HR: 88 (24 @ 08:08) (60 - 101)  BP: 115/76 (24 @ 04:51) (99/64 - 115/76)  RR: 18 (24 @ 04:51) (18 - 18)  SpO2: 92% (24 @ 04:51) (92% - 94%)  Wt(kg): --      LV EF:      Labs:      135  |  101  |  11  ----------------------------<  108<H>  4.1   |  22  |  0.54    Ca    9.7      2024 06:28  Phos  4.4       Mg     1.9         TPro  6.4  /  Alb  3.6  /  TBili  0.7  /  DBili  x   /  AST  32  /  ALT  60<H>  /  AlkPhos  156<H>                                 9.4    6.52  )-----------( 197      ( 2024 06:28 )             28.7             CAPILLARY BLOOD GLUCOSE               CXR:    I&O's Detail    2024 07:01  -  2024 07:00  --------------------------------------------------------  IN:    Oral Fluid: 240 mL  Total IN: 240 mL    OUT:  Total OUT: 0 mL    Total NET: 240 mL      2024 07:01  -  2024 08:44  --------------------------------------------------------  IN:    Oral Fluid: 240 mL  Total IN: 240 mL    OUT:  Total OUT: 0 mL    Total NET: 240 mL      BOWEL MOVEMENT:  [ x] YES [ ] NO      Daily     Daily Weight in k.5 (2024 08:45)  Medications:  acetaminophen     Tablet .. 650 milliGRAM(s) Oral every 6 hours PRN  aspirin  chewable 81 milliGRAM(s) Oral daily  bisacodyl Suppository 10 milliGRAM(s) Rectal once  metoprolol succinate ER 50 milliGRAM(s) Oral every 12 hours  oxyCODONE    IR 5 milliGRAM(s) Oral every 4 hours PRN  pantoprazole    Tablet 40 milliGRAM(s) Oral before breakfast  polyethylene glycol 3350 17 Gram(s) Oral daily  senna 2 Tablet(s) Oral at bedtime  sodium chloride 0.9% lock flush 3 milliLiter(s) IV Push every 8 hours        Physical Exam:    Neurology: alert and oriented x 3,   CV : s1 s2 RRR  Sternal Wound :  CDI , Stable  Lungs: cta  Abdomen: soft, nontender, nondistended, positive bowel sounds, last bowel movement this am       :    voiding         Extremities:    trace   edema   /  -   calve tenderness ,               PAST MEDICAL & SURGICAL HISTORY:  SCC (Squamous Cell Carcinoma) (ICD9 173.9)  Head and neck  treated with Chem/Radiation/ LN removal      Chronic Gastritis (ICD9 535.10)      Uterine Cancer (ICD9 179)  no chemo no radiation      HTN - Hypertension      Dyslipidemia (ICD9 272.4)      H/O: Osteoarthritis (ICD9 V13.4)      Chest Pain (ICD9 786.50)      CAD (coronary artery disease)      History of PSVT (paroxysmal supraventricular tachycardia)      Incisional hernia      Kidney stone      Prediabetes      History of Cholecystectomy (ICD9 V45.79)        History of Tonsillectomy (ICD9 V45.89)      H/O: Hysterectomy (ICD9 V88.01)  at age 32      Lymph Node Cancer (ICD9 196.9)  removal of cervical LN due to head & neck squamous cell carcinoma ;       S/P cardiac cath

## 2024-06-12 NOTE — DISCHARGE NOTE NURSING/CASE MANAGEMENT/SOCIAL WORK - PATIENT PORTAL LINK FT
You can access the FollowMyHealth Patient Portal offered by [facility] by registering at the following website: [email or web address]. By joining [facility]’s FollowMyHealth portal, you will also be able to view your health information using other applications (apps) compatible with our system.

## 2024-06-12 NOTE — DISCHARGE NOTE PROVIDER - NSDCFUADDINST_GEN_ALL_CORE_FT
Wash incisions with soap and water using washcloth in shower daily,do not apply any lotion,powder,oil,sunscreen to any surgical incision  Please come to ED or Call Cardio thoracic office at [phone] if Chest pain, Shortness of Breath, persistant Nausea & vomiting, oozing from wounds,palpitations or pain not relieved with medication  Weigh yourself daily>notify surgeons office if  2 lb increase in weight in 24 hours.  1. Take ALL of your medications as ordered. Fill your prescriptions the day you are discharged and take according to the schedule you were given. Continue to take a stool softener if you are taking narcotic pain medications. AVOID medications such as ibuprofen or naproxen if you have had bypass surgery. If you have any questions or are unable to fill the prescriptions, please call the office right away at [phone].  2. Shower daily. Clean all incisions daily while showering with warm water and mild soap, pat dry with a clean towel and do not cover with any dressings unless instructed to. No bathing, swimming in a pool or the ocean until instructed by MD.  DO NOT use creams or lotions on the wound.  3. We advise that you do not drive until instructed by MD. Use your red pillow between chest and seatbelt to avoid injury in the event of a motor vehicle accident until you see the surgeon again in the office.  4. You may not return to work until instructed by MD.   5. Please eat a low fat, low cholesterol, low salt diet. (No added/extra salt). A nutritional supplement like Ensure or Glucerna (for diabetics) may help you reach your nutritional goals after surgery and aid in your recovery.  6. Weigh yourself every day in the morning and record it in the weight log in your red folder. Notify the office of any weight gain more than 2-3 pounds in 24 hours.  **Please LIMIT YOUR FLUID INTAKE TO ABOUT 4 8 OUNCE GLASSES OF BEVERAGE DAILY.**  7. Continue breathing exercises several times a day. Continue to use your heart pillow when coughing.  8. No heavy lifting nothing greater than 5 pounds until cleared by MD. We recommend that you sleep on your back and not your side or stomach for the next 4-6 weeks.  9. Call / Notify MD any fever greater than 101.0, any drainage from incisions or if they become red, hot or very tender to the touch.  10. Increase activity as tolerated. Walk indoors and/or outdoors at least 3 times a day.

## 2024-06-12 NOTE — DISCHARGE NOTE PROVIDER - NSDCMRMEDTOKEN_GEN_ALL_CORE_FT
acetaminophen 325 mg oral tablet: 2 tab(s) orally every 8 hours as needed for  moderate pain  aspirin 81 mg oral tablet: 1 orally once a day  metoprolol succinate 50 mg oral tablet, extended release: 1 tab(s) orally every 12 hours  PriLOSEC 40 mg oral delayed release capsule: 1 cap(s) orally once a day

## 2024-06-12 NOTE — DISCHARGE NOTE PROVIDER - HOSPITAL COURSE
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 Pt is scheduled for aortic valve replacement with Dr. Sandeep Jj on 6/3/24. Per pt, she was hospitalized in November due to syncope from vomiting. During her hospitalization she had an echocardiogram that showed severe aortic regurgitation Pt reports palpitations lasting less than a minute before spontaneously resolving, denies triggers and left sided chest pressure. Pt reports plans for cardiac catheterization at [facility] on 5/31/24. After examiation with Dr. Jj, the above surgery was recommended.     S/P 6/5/24 AVR (t)/ 2 units prbc given intraop  postop extubated 6/5  + pressor support/ insulin gttp for glycemic control  sb in ctu- amio ERP d/c as per Dr. Jj  6/7 vss; rsr 60-70; rt femoral deyvi d/c- bedrest til 1300; tx floor; + epicardial pw - VVI 40; pain management  6/8 vss; rsr ; hypotension noted this am--> albumin given; diuretics and norvasac d/c; lop decreased 25 po bid; bp responded   6/9 VSS will isolate pw today -  discharge planning- home when stable   6/10 d/c pw, ambulation  f/u orthostatic bp's  6/11  Brief PAT overnight, on lopressor 50 bid, change to toprol xl 50bid.  LFTS up, atorv d/c  Non orthostatic.  TTE today , d/c home tomorrow  6/12 DC home

## 2024-06-13 ENCOUNTER — TRANSCRIPTION ENCOUNTER (OUTPATIENT)
Age: 74
End: 2024-06-13

## 2024-06-14 ENCOUNTER — APPOINTMENT (OUTPATIENT)
Dept: CARE COORDINATION | Facility: HOME HEALTH | Age: 74
End: 2024-06-14
Payer: MEDICAID

## 2024-06-14 VITALS
SYSTOLIC BLOOD PRESSURE: 112 MMHG | BODY MASS INDEX: 22.42 KG/M2 | RESPIRATION RATE: 16 BRPM | HEART RATE: 76 BPM | OXYGEN SATURATION: 98 % | DIASTOLIC BLOOD PRESSURE: 62 MMHG | WEIGHT: 114.8 LBS

## 2024-06-14 PROCEDURE — 99024 POSTOP FOLLOW-UP VISIT: CPT

## 2024-06-14 RX ORDER — AMLODIPINE BESYLATE 5 MG/1
5 TABLET ORAL DAILY
Refills: 0 | Status: DISCONTINUED | COMMUNITY
Start: 2019-07-22 | End: 2024-06-14

## 2024-06-14 NOTE — PHYSICAL EXAM
[] : no respiratory distress [Respiration, Rhythm And Depth] : normal respiratory rhythm and effort [Exaggerated Use Of Accessory Muscles For Inspiration] : no accessory muscle use [Apical Impulse] : the apical impulse was normal [Heart Rate And Rhythm] : heart rate was normal and rhythm regular [Heart Sounds] : normal S1 and S2 [FreeTextEntry1] : MSI, CT sites without erythema, drainage or warmth, with edges well approximated.  Sternum stable. BLE minimal edema [Bowel Sounds] : normal bowel sounds [Abdomen Soft] : soft [Abdomen Tenderness] : non-tender

## 2024-06-14 NOTE — HISTORY OF PRESENT ILLNESS
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 Pt is scheduled for aortic valve replacement with Dr. Sandeep Jj on 6/3/24. Per pt, she was hospitalized in November due to syncope from vomiting. During her hospitalization she had an echocardiogram that showed severe aortic regurgitation Pt reports palpitations lasting less than a minute before spontaneously resolving, denies triggers and left sided chest pressure. Pt reports plans for cardiac catheterization at [facility] on 5/31/24. After examiation with Dr. Jj, the above surgery was recommended.  S/P 6/5/24 AVR (t)/ 2 units prbc given intraop postop extubated 6/5 + pressor support/ insulin gttp for glycemic control sb in ctu- amio ERP d/c as per Dr. Jj 6/7 vss; rsr 60-70; rt femoral deyvi d/c- bedrest til 1300; tx floor; + epicardial pw - VVI 40; pain management 6/8 vss; rsr ; hypotension noted this am--> albumin given; diuretics and norvasac d/c; lop decreased 25 po bid; bp responded 6/9 VSS will isolate pw today - discharge planning- home when stable 6/10 d/c pw, ambulation f/u orthostatic bp's 6/11 Brief PAT overnight, on lopressor 50 bid, change to toprol xl 50bid. LFTS up, atorv d/c Non orthostatic. TTE today , d/c home tomorrow 6/12 DC home  pt recovering well at home, lives with daughter language line used with  Jesús education and emotional support provided  all questions answered

## 2024-06-14 NOTE — PLAN
[TextEntry] : 1. daily weights and showers 2. cont current meds 3. keep legs elevated 4. incentive spirometry 5. homecare- [facility] 6. diet- low salt, low fat 7. f/u appts - CTS 6/20, cards 7/11 8. [facility] team will continue to f/u with pt status, pt agrees to call with any questions, issues or concerns.

## 2024-06-19 LAB
HGB BLDA-MCNC: 11.6 G/DL — LOW (ref 11.7–16.1)
HGB FLD-MCNC: 11.3 G/DL — LOW (ref 11.7–16.5)
OXYHGB MFR BLDA: 96.2 % — HIGH (ref 90–95)
OXYHGB MFR BLDMV: 75.9 % — LOW (ref 90–95)
SAO2 % BLD: 77.6 % — SIGNIFICANT CHANGE UP (ref 60–90)
SAO2 % BLDA: 98.4 % — HIGH (ref 94–98)

## 2024-06-20 ENCOUNTER — APPOINTMENT (OUTPATIENT)
Dept: CARDIOTHORACIC SURGERY | Facility: CLINIC | Age: 74
End: 2024-06-20
Payer: MEDICAID

## 2024-06-20 VITALS
RESPIRATION RATE: 16 BRPM | TEMPERATURE: 98.1 F | DIASTOLIC BLOOD PRESSURE: 66 MMHG | OXYGEN SATURATION: 96 % | HEART RATE: 85 BPM | BODY MASS INDEX: 20.62 KG/M2 | HEIGHT: 60 IN | SYSTOLIC BLOOD PRESSURE: 125 MMHG | WEIGHT: 105 LBS

## 2024-06-20 DIAGNOSIS — Z95.2 PRESENCE OF PROSTHETIC HEART VALVE: ICD-10-CM

## 2024-06-20 DIAGNOSIS — Z09 ENCOUNTER FOR FOLLOW-UP EXAMINATION AFTER COMPLETED TREATMENT FOR CONDITIONS OTHER THAN MALIGNANT NEOPLASM: ICD-10-CM

## 2024-06-20 PROCEDURE — 99024 POSTOP FOLLOW-UP VISIT: CPT

## 2024-06-20 RX ORDER — METOPROLOL SUCCINATE 50 MG/1
50 TABLET, EXTENDED RELEASE ORAL
Qty: 180 | Refills: 0 | Status: ACTIVE | COMMUNITY
Start: 2019-07-22 | End: 1900-01-01

## 2024-06-20 NOTE — REASON FOR VISIT
[Family Member] : family member [de-identified] :  Aortic valve replacement utilizing a 23 Bovine Inspiris Resilia pericardial valve and aortic root endarterectomy to remove calcium. [de-identified] : 6/5/24

## 2024-06-20 NOTE — PHYSICAL EXAM
[] : no respiratory distress [Respiration, Rhythm And Depth] : normal respiratory rhythm and effort [Auscultation Breath Sounds / Voice Sounds] : lungs were clear to auscultation bilaterally [Apical Impulse] : the apical impulse was normal [Heart Rate And Rhythm] : heart rate was normal and rhythm regular [Heart Sounds] : normal S1 and S2 [Murmurs] : no murmurs [Clean] : clean [Dry] : dry [Healing Well] : healing well [FreeTextEntry3] : Trace pedal edema

## 2024-06-20 NOTE — CONSULT LETTER
[Dear  ___] : Dear  [unfilled], [Courtesy Letter:] : I had the pleasure of seeing your patient, [unfilled], in my office today. [Please see my note below.] : Please see my note below. [Consult Closing:] : Thank you very much for allowing me to participate in the care of this patient.  If you have any questions, please do not hesitate to contact me. [Sincerely,] : Sincerely, [FreeTextEntry2] : Dr. Zuleyma Lyle,  [FreeTextEntry3] : Sandeep Jj MD  &   Cardiovascular & Thoracic Surgery [place] [phone]

## 2024-06-20 NOTE — ASSESSMENT
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 Pt is scheduled for aortic valve replacement with Dr. Sandeep Jj on 6/3/24. Per pt, she was hospitalized in November due to syncope from vomiting. During her hospitalization she had an echocardiogram that showed severe aortic regurgitation Pt reports palpitations lasting less than a minute before spontaneously resolving, denies triggers and left sided chest pressure. Pt reports plans for cardiac catheterization at [facility] on 5/31/24. After examiation with Dr. Jj, the above surgery was recommended.   She is S/P Aortic valve replacement utilizing a 23 Bovine Inspiris Resilia pericardial valve and aortic root endarterectomy to remove calcium on  6/5/24, 2 units prbc given intraop, postop extubated 6/5 + pressor support/ insulin gttp for glycemic control, sb in ctu- amio ERP d/philomena 6/7 rsr 60-70; rt femoral deyvi d/c- bedrest til 1300; 6/8  rsr ; hypotension noted this am--> albumin given; diuretics and norvasac d/c; lop decreased 25 po bid; bp responded, f/u orthostatic bp's 6/11  Brief PAT overnight, on lopressor 50 bid, change to toprol xl 50bid. LFTS up, atorv d/c. Discharged to home. She is here for post op visit.   Today she presents and reports  that she has occasional dizziness when walking fast. Denies any chest pain, shortness of breath, palpitations, or pedal edema.  Today on exam patient's lungs clear bilaterally, normal sinus rhythm, sternum stable, incision clean, dry and intact. Trace peripheral edema noted.   Instructed patient on importance of optimal glycemic control, daily showering, daily weights, any signs of fever (temperature greater than 101F, chills,  incentive spirometer use, and increase ambulation as tolerated. Instructed to call office with any signs or symptoms of infection or weight gain of 2 or more pounds in 1 day or 3 or more pounds in 1 week.    Discussed intake of plant based foods, including vegetables, fruits, and whole grain foods: legumes, nuts and seeds, fish or seafood, lean meats, and non-fat or low-fat diary foods. Plant based oils (non-tropical) in place of solid fats. Instructed patient to limit intake of high fat meats and processed meats, high-fat diary foods, dietary cholesterol and sodium, foods and beverages with added sugars.   Plan: 1) Continue current medication regimen 2) Follow up with cardiologist ( Dr. Zuleyma Lyle on 7/11/24) and PCP  3)  Labs: next visit LFT  4) Follow up P in 2 weeks  5) SBE antibiotic prophylaxis discussed at length  6) Continue to increase activity and walk daily as tolerated. Continue to use incentive spirometer.  7) Keep legs elevated above heart when resting/sitting/sleeping.  8) Call MD if you experience fever, fatigue, dizziness, confusion, syncope, shortness of breath, chest pain not relieved with analgesics, increased redness/drainage from the surgical  incision site 9) Virtual Cardiac Rehab referral

## 2024-07-11 ENCOUNTER — APPOINTMENT (OUTPATIENT)
Dept: CARDIOLOGY | Facility: HOSPITAL | Age: 74
End: 2024-07-11

## 2024-07-11 ENCOUNTER — NON-APPOINTMENT (OUTPATIENT)
Age: 74
End: 2024-07-11

## 2024-07-11 ENCOUNTER — OUTPATIENT (OUTPATIENT)
Dept: OUTPATIENT SERVICES | Facility: HOSPITAL | Age: 74
LOS: 1 days | End: 2024-07-11
Payer: COMMERCIAL

## 2024-07-11 ENCOUNTER — APPOINTMENT (OUTPATIENT)
Dept: CARDIOTHORACIC SURGERY | Facility: CLINIC | Age: 74
End: 2024-07-11
Payer: MEDICAID

## 2024-07-11 VITALS
BODY MASS INDEX: 21.2 KG/M2 | DIASTOLIC BLOOD PRESSURE: 81 MMHG | TEMPERATURE: 97.7 F | HEIGHT: 60 IN | WEIGHT: 108 LBS | SYSTOLIC BLOOD PRESSURE: 132 MMHG | HEART RATE: 77 BPM | OXYGEN SATURATION: 98 % | RESPIRATION RATE: 14 BRPM

## 2024-07-11 DIAGNOSIS — I35.1 NONRHEUMATIC AORTIC (VALVE) INSUFFICIENCY: ICD-10-CM

## 2024-07-11 DIAGNOSIS — Z95.2 PRESENCE OF PROSTHETIC HEART VALVE: ICD-10-CM

## 2024-07-11 DIAGNOSIS — I10 ESSENTIAL (PRIMARY) HYPERTENSION: ICD-10-CM

## 2024-07-11 DIAGNOSIS — I25.10 ATHEROSCLEROTIC HEART DISEASE OF NATIVE CORONARY ARTERY WITHOUT ANGINA PECTORIS: ICD-10-CM

## 2024-07-11 DIAGNOSIS — Z98.890 OTHER SPECIFIED POSTPROCEDURAL STATES: Chronic | ICD-10-CM

## 2024-07-11 DIAGNOSIS — I25.10 ATHEROSCLEROTIC HEART DISEASE OF NATIVE CORONARY ARTERY W/OUT ANGINA PECTORIS: ICD-10-CM

## 2024-07-11 LAB
ALBUMIN SERPL ELPH-MCNC: 4.2 G/DL
ALP BLD-CCNC: 104 U/L
ALT SERPL-CCNC: 12 U/L
ANION GAP SERPL CALC-SCNC: 12 MMOL/L
AST SERPL-CCNC: 19 U/L
BILIRUB SERPL-MCNC: <0.2 MG/DL
BUN SERPL-MCNC: 17 MG/DL
CALCIUM SERPL-MCNC: 9.5 MG/DL
CHLORIDE SERPL-SCNC: 95 MMOL/L
CO2 SERPL-SCNC: 27 MMOL/L
CREAT SERPL-MCNC: 0.66 MG/DL
GLUCOSE SERPL-MCNC: 83 MG/DL
POTASSIUM SERPL-SCNC: 5 MMOL/L
PROT SERPL-MCNC: 7.1 G/DL
SODIUM SERPL-SCNC: 135 MMOL/L

## 2024-07-11 PROCEDURE — G0463: CPT

## 2024-07-11 PROCEDURE — 93005 ELECTROCARDIOGRAM TRACING: CPT

## 2024-07-11 PROCEDURE — 99024 POSTOP FOLLOW-UP VISIT: CPT

## 2024-07-11 RX ORDER — CEPHALEXIN 500 MG/1
500 TABLET ORAL
Qty: 12 | Refills: 0 | Status: ACTIVE | COMMUNITY
Start: 2024-07-11 | End: 1900-01-01

## 2024-07-11 RX ORDER — ATORVASTATIN CALCIUM 80 MG/1
80 TABLET, FILM COATED ORAL DAILY
Qty: 90 | Refills: 0 | Status: ACTIVE | COMMUNITY
Start: 2024-07-11 | End: 1900-01-01

## 2024-07-11 RX ORDER — LISINOPRIL 10 MG/1
10 TABLET ORAL DAILY
Qty: 90 | Refills: 0 | Status: ACTIVE | COMMUNITY
Start: 2024-07-11 | End: 1900-01-01

## 2024-07-15 DIAGNOSIS — Z95.2 PRESENCE OF PROSTHETIC HEART VALVE: ICD-10-CM

## 2024-07-15 DIAGNOSIS — I10 ESSENTIAL (PRIMARY) HYPERTENSION: ICD-10-CM

## 2024-07-15 NOTE — PATIENT PROFILE ADULT - FALL HARM RISK - CONCLUSION
"Subjective   Patient ID: Brit Najera is a 49 y.o. female who presents for Animal Bite (Cat bite  DOI  7/14/2024  right hand palm area. Patient's cat/Tdap 9/9/2019).    HPI there was a solitary puncture wound that occurred 1 day ago.  It is located on the thumb pad near the wrist on the ventral side..  Is erythema around it and is mildly firm to the touch and that it is painful.  It happened from the patient's Cat is up-to-date on all shots.    Review of Systems  Constitutional: Patient is negative for fever, fatigue, weight change.  HEENT: Patient is negative change in vision, hearing, swallow.  Cardio: Patient is negative for chest pain, lower extremity edema.  Pulmonary: Patient is negative for cough, shortness of breath.  Integument: Patient is positive for an animal bite to the right hand.  Objective   /70 (BP Location: Left arm, Patient Position: Sitting, BP Cuff Size: Adult)   Pulse 97   Ht 1.651 m (5' 5\")   Wt 53.5 kg (118 lb)   LMP 06/10/2024 (Approximate)   SpO2 97%   BMI 19.64 kg/m²     Physical Exam  General: Awake and alert no apparent distress.  Skin: Patient with a solitary bite wound approximately 3 mm in length at the base of the thumb pad near the wrist of the right hand.  Assessment/Plan   Problem List Items Addressed This Visit    None  Visit Diagnoses         Codes    Cat bite, initial encounter    -  Primary   needs better control.  Patient has an allergy to amoxicillin.  Will begin doxycycline.  Patient to use warm compresses to the area and may use Tylenol for discomfort.  Patient will follow-up if no better in 3 days. W55.01XA    Relevant Medications    doxycycline (Vibramycin) 100 mg capsule               " [FreeTextEntry1] : Parth is a very pleasant 53-year-old male who presents today with a chronic recurring recalcitrant history of left hand pain specifically swelling at the level of the MCP joint of the index finger.  He does practice martial arts.  He has had this for years and worsening Fall with Harm Risk

## 2024-07-30 ENCOUNTER — APPOINTMENT (OUTPATIENT)
Dept: CARDIOLOGY | Facility: CLINIC | Age: 74
End: 2024-07-30

## 2024-08-29 ENCOUNTER — NON-APPOINTMENT (OUTPATIENT)
Age: 74
End: 2024-08-29

## 2024-08-29 ENCOUNTER — APPOINTMENT (OUTPATIENT)
Dept: CARDIOLOGY | Facility: HOSPITAL | Age: 74
End: 2024-08-29

## 2024-08-29 ENCOUNTER — OUTPATIENT (OUTPATIENT)
Dept: OUTPATIENT SERVICES | Facility: HOSPITAL | Age: 74
LOS: 1 days | End: 2024-08-29

## 2024-08-29 VITALS
SYSTOLIC BLOOD PRESSURE: 132 MMHG | HEIGHT: 60 IN | OXYGEN SATURATION: 98 % | HEART RATE: 63 BPM | DIASTOLIC BLOOD PRESSURE: 80 MMHG | BODY MASS INDEX: 21.79 KG/M2 | WEIGHT: 111 LBS

## 2024-08-29 DIAGNOSIS — I25.10 ATHEROSCLEROTIC HEART DISEASE OF NATIVE CORONARY ARTERY WITHOUT ANGINA PECTORIS: ICD-10-CM

## 2024-08-29 DIAGNOSIS — Z98.890 OTHER SPECIFIED POSTPROCEDURAL STATES: Chronic | ICD-10-CM

## 2024-08-29 PROCEDURE — 93005 ELECTROCARDIOGRAM TRACING: CPT

## 2024-08-29 PROCEDURE — G0463: CPT

## 2024-08-29 NOTE — HISTORY OF PRESENT ILLNESS
[FreeTextEntry1] : 74F PMH mod-severe AR, SVT, Nonobstructive CAD, HTN, HLD, SCC of Head/Neck s/p lymph node excision, Endometrial Ca s/p hysterectomy here for follow-up of mod-severe AR. Now s/p bioprosthetic AVR w/ 23 Bovine Inspiris Resilia pericardial valve and aortic root endarterectomy to remove calcium. Feeling well today SOB has resolved as well as her palpitations. She reports post-operative chest pain which has now resolved. Reports occasional orthostatic dizziness if she stands too quickly.  PMH: AR, SVT, nonobstructive CAD, HTN, HLD, SCC of Head/Neck s/p lymph node excision, Endometrial Ca s/p hysterectomy PSH: hysterectomy, lymph node excision, CCY, bioprosthetic AVR+aortic roon endarterectomy FH: No cardiac hx SH: Denies tobacco, EtOH, illicit drug use Meds: Aspirin 81mg, Metoprolol XL 50mg, Omeprazole 40mg, Lisinopril 10mg QDay All: PCN (itching in throat and tongue)  Stress TTE (5/2/24) 1. Abnormal exercise stress echocardiogram. 2. Patient achieved 4.6 METs, which is consistent with poor exercise capacity. 3. Baseline/resting valve assessment showed moderate to severe aortic regurgitation. 4. At stress, valve assessment revealed severe aortic regurgitation. 5. The heart rate response was exaggerated. 6. Hypertensive blood pressure response to exercise. 7. No echocardiographic evidence of exercise induced ischemia. 8. Basseline Left ventricular systolic function is normal. 9. Normal right ventricular cavity size, with normal wall thickness, and normal systolic function. 10. Moderate to severe aortic regurgitation.  [facility] 6/2024 LM Left main artery: The segment is visually normal in size and structure.  LAD Mid left anterior descending: There is a 40 % stenosis. First diagonal: There is a 45 % stenosis. Proximal left anterior descending: There is a 20 % stenosis.  CX Proximal circumflex: There is a 20 % stenosis. First obtuse marginal: There is a 15 % stenosis.  RCA Proximal right coronary artery: There is a 20 % stenosis. Mid right coronary artery: There is a 15 % stenosis. Right posterior descending artery: There is a 50 % stenosis. Right posterior atrioventricular: There is a 20 % stenosis.

## 2024-08-29 NOTE — ASSESSMENT
[FreeTextEntry1] : #Mod-Severe AR #S/P AVR #S/P Aortic Root Endarterectomy - continue aspirin 81mg - prophylactic cephalexin prior to dental procedures: 2 g 30 to 60 minutes before procedure; if inadvertently not given prior to the procedure, may be administered up to 2 hours after the procedure - TTE in 1yr 6/2025  #Nonobstructive CAD - cont atorvastatin 40mg QHS - repeat lipid panel in October  #Hx SVT #Palpitations Noted during hospitalization for [facility] in 2009 to have SVT episode with HR 150s. Patient continues to report daily palpitations. 14d Zio patch showed 219 episodes of SVT, longest 28s at HR 110s, fastest 5 beats at 179. Patient-triggered events not associated with arrhythmic episodes. - cont metoprolol XL 50mg BID  #HTN - cont lisinopril 10mg QDay, can increase if still HTN - BMP

## 2024-09-03 LAB
ANION GAP SERPL CALC-SCNC: 11 MMOL/L
BUN SERPL-MCNC: 17 MG/DL
CALCIUM SERPL-MCNC: 9.5 MG/DL
CHLORIDE SERPL-SCNC: 99 MMOL/L
CO2 SERPL-SCNC: 26 MMOL/L
CREAT SERPL-MCNC: 0.63 MG/DL
EGFR: 94 ML/MIN/1.73M2
GLUCOSE SERPL-MCNC: 84 MG/DL
POTASSIUM SERPL-SCNC: 4.6 MMOL/L
SODIUM SERPL-SCNC: 136 MMOL/L

## 2024-09-17 ENCOUNTER — RX RENEWAL (OUTPATIENT)
Age: 74
End: 2024-09-17

## 2024-10-24 ENCOUNTER — NON-APPOINTMENT (OUTPATIENT)
Age: 74
End: 2024-10-24

## 2024-10-24 ENCOUNTER — APPOINTMENT (OUTPATIENT)
Dept: CARDIOLOGY | Facility: HOSPITAL | Age: 74
End: 2024-10-24

## 2024-10-24 ENCOUNTER — OUTPATIENT (OUTPATIENT)
Dept: OUTPATIENT SERVICES | Facility: HOSPITAL | Age: 74
LOS: 1 days | End: 2024-10-24
Payer: COMMERCIAL

## 2024-10-24 VITALS
DIASTOLIC BLOOD PRESSURE: 81 MMHG | WEIGHT: 112 LBS | HEART RATE: 71 BPM | OXYGEN SATURATION: 98 % | BODY MASS INDEX: 21.99 KG/M2 | HEIGHT: 60 IN | SYSTOLIC BLOOD PRESSURE: 131 MMHG

## 2024-10-24 DIAGNOSIS — I25.10 ATHEROSCLEROTIC HEART DISEASE OF NATIVE CORONARY ARTERY WITHOUT ANGINA PECTORIS: ICD-10-CM

## 2024-10-24 DIAGNOSIS — I25.10 ATHEROSCLEROTIC HEART DISEASE OF NATIVE CORONARY ARTERY W/OUT ANGINA PECTORIS: ICD-10-CM

## 2024-10-24 DIAGNOSIS — Z98.890 OTHER SPECIFIED POSTPROCEDURAL STATES: Chronic | ICD-10-CM

## 2024-10-24 DIAGNOSIS — I10 ESSENTIAL (PRIMARY) HYPERTENSION: ICD-10-CM

## 2024-10-24 PROCEDURE — G0463: CPT

## 2024-10-24 PROCEDURE — 93005 ELECTROCARDIOGRAM TRACING: CPT

## 2024-10-25 DIAGNOSIS — I10 ESSENTIAL (PRIMARY) HYPERTENSION: ICD-10-CM

## 2024-10-25 LAB
CHOLEST SERPL-MCNC: 168 MG/DL
HDLC SERPL-MCNC: 57 MG/DL
LDLC SERPL CALC-MCNC: 81 MG/DL
NONHDLC SERPL-MCNC: 110 MG/DL
TRIGL SERPL-MCNC: 174 MG/DL

## 2024-10-28 NOTE — ASSESSMENT
[FreeTextEntry1] : #Mod-Severe AR #S/P AVR #S/P Aortic Root Endarterectomy - continue aspirin 81mg - prophylactic cephalexin prior to dental procedures: 2 g 30 to 60 minutes before procedure; if inadvertently not given prior to the procedure, may be administered up to 2 hours after the procedure - TTE in 1yr 6/2025  #Nonobstructive CAD - cont atorvastatin 40mg QHS - repeat lipid panel today (last ate 2hrs ago)  #Hx SVT #Palpitations Noted during hospitalization for [facility] in 2009 to have SVT episode with HR 150s. Patient continues to report daily palpitations. 14d Zio patch showed 219 episodes of SVT, longest 28s at HR 110s, fastest 5 beats at 179. Patient-triggered events not associated with arrhythmic episodes. Reporting some daytime fatigue and resolution of palpitations, will decrease metoprolol dose and follow symptoms - decrease metoprolol to XL 50mg QHS  #HTN Still mildly hypertensive, however pt reporting orthostatic symptoms on this dose. We discussed moving antiHTN doses to the evening and keeping at current dose despite some persistent HTN to avoid orthostatic syncope.  - cont lisinopril 10mg QDay

## 2024-10-28 NOTE — HISTORY OF PRESENT ILLNESS
[FreeTextEntry1] : 74F PMH mod-severe AR s/p bioprosthetic AVR w/ 23 Bovine Inspiris Resilia pericardial valve and aortic root endarterectomy to remove calcium, SVT, Nonobstructive CAD, HTN, HLD, SCC of Head/Neck s/p lymph node excision, Endometrial Ca s/p hysterectomy here for follow-up Feeling well today SOB has resolved as well as her palpitations. She reports post-operative chest pain which has now resolved. Reports occasional orthostatic dizziness if she stands too quickly.  PMH: AR, SVT, nonobstructive CAD, HTN, HLD, SCC of Head/Neck s/p lymph node excision, Endometrial Ca s/p hysterectomy PSH: hysterectomy, lymph node excision, CCY, bioprosthetic AVR+aortic roon endarterectomy FH: No cardiac hx SH: Denies tobacco, EtOH, illicit drug use Meds: Aspirin 81mg, Metoprolol XL 50mg, Omeprazole 40mg, Lisinopril 10mg QDay All: PCN (itching in throat and tongue)  Stress TTE (5/2/24) 1. Abnormal exercise stress echocardiogram. 2. Patient achieved 4.6 METs, which is consistent with poor exercise capacity. 3. Baseline/resting valve assessment showed moderate to severe aortic regurgitation. 4. At stress, valve assessment revealed severe aortic regurgitation. 5. The heart rate response was exaggerated. 6. Hypertensive blood pressure response to exercise. 7. No echocardiographic evidence of exercise induced ischemia. 8. Basseline Left ventricular systolic function is normal. 9. Normal right ventricular cavity size, with normal wall thickness, and normal systolic function. 10. Moderate to severe aortic regurgitation.  [facility] 6/2024 LM Left main artery: The segment is visually normal in size and structure.  LAD Mid left anterior descending: There is a 40 % stenosis. First diagonal: There is a 45 % stenosis. Proximal left anterior descending: There is a 20 % stenosis.  CX Proximal circumflex: There is a 20 % stenosis. First obtuse marginal: There is a 15 % stenosis.  RCA Proximal right coronary artery: There is a 20 % stenosis. Mid right coronary artery: There is a 15 % stenosis. Right posterior descending artery: There is a 50 % stenosis. Right posterior atrioventricular: There is a 20 % stenosis.

## 2024-10-28 NOTE — HISTORY OF PRESENT ILLNESS
[FreeTextEntry1] : 74F PMH mod-severe AR s/p bioprosthetic AVR w/ 23 Bovine Inspiris Resilia pericardial valve and aortic root endarterectomy to remove calcium, SVT, Nonobstructive CAD, HTN, HLD, SCC of Head/Neck s/p lymph node excision, Endometrial Ca s/p hysterectomy here for follow-up Feeling well today SOB has resolved as well as her palpitations. She reports post-operative chest pain which has now resolved. Reports occasional orthostatic dizziness if she stands too quickly.  PMH: AR, SVT, nonobstructive CAD, HTN, HLD, SCC of Head/Neck s/p lymph node excision, Endometrial Ca s/p hysterectomy PSH: hysterectomy, lymph node excision, CCY, bioprosthetic AVR+aortic roon endarterectomy FH: No cardiac hx SH: Denies tobacco, EtOH, illicit drug use Meds: Aspirin 81mg, Metoprolol XL 50mg, Omeprazole 40mg, Lisinopril 10mg QDay All: PCN (itching in throat and tongue)  Stress TTE (5/2/24) 1. Abnormal exercise stress echocardiogram. 2. Patient achieved 4.6 METs, which is consistent with poor exercise capacity. 3. Baseline/resting valve assessment showed moderate to severe aortic regurgitation. 4. At stress, valve assessment revealed severe aortic regurgitation. 5. The heart rate response was exaggerated. 6. Hypertensive blood pressure response to exercise. 7. No echocardiographic evidence of exercise induced ischemia. 8. Basseline Left ventricular systolic function is normal. 9. Normal right ventricular cavity size, with normal wall thickness, and normal systolic function. 10. Moderate to severe aortic regurgitation.  [facility] 6/2024 LM Left main artery: The segment is visually normal in size and structure.  LAD Mid left anterior descending: There is a 40 % stenosis. First diagonal: There is a 45 % stenosis. Proximal left anterior descending: There is a 20 % stenosis.  CX Proximal circumflex: There is a 20 % stenosis. First obtuse marginal: There is a 15 % stenosis.  RCA Proximal right coronary artery: There is a 20 % stenosis. Mid right coronary artery: There is a 15 % stenosis. Right posterior descending artery: There is a 50 % stenosis. Right posterior atrioventricular: There is a 20 % stenosis.

## 2024-10-28 NOTE — PHYSICAL EXAM

## 2024-10-28 NOTE — ASSESSMENT
[FreeTextEntry1] : #Mod-Severe AR #S/P AVR #S/P Aortic Root Endarterectomy - continue aspirin 81mg - prophylactic cephalexin prior to dental procedures: 2 g 30 to 60 minutes before procedure; if inadvertently not given prior to the procedure, may be administered up to 2 hours after the procedure - TTE in 1yr 6/2025  #Nonobstructive CAD - cont atorvastatin 40mg QHS - repeat lipid panel today (last ate 2hrs ago)  #Hx SVT #Palpitations Noted during hospitalization for [facility] in 2009 to have SVT episode with HR 150s. Patient continues to report daily palpitations. 14d Zio patch showed 219 episodes of SVT, longest 28s at HR 110s, fastest 5 beats at 179. Patient-triggered events not associated with arrhythmic episodes. Reporting some daytime fatigue and resolution of palpitations, will decrease metoprolol dose and follow symptoms - decrease metoprolol to XL 50mg QHS  #HTN Still mildly hypertensive, however pt reporting orthostatic symptoms on this dose. We discussed moving antiHTN doses to the evening and keeping at current dose despite some persistent HTN to avoid orthostatic syncope.  - cont lisinopril 10mg QDay

## 2024-10-28 NOTE — PHYSICAL EXAM

## 2024-10-31 RX ORDER — EZETIMIBE 10 MG/1
10 TABLET ORAL
Qty: 90 | Refills: 0 | Status: ACTIVE | COMMUNITY
Start: 2024-10-31 | End: 1900-01-01

## 2025-01-02 NOTE — ED PROVIDER NOTE - EKG #1 DATE/TIME
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  She was seen in the office in July 2024, at which time her symptoms were controlled with pantoprazole 40 mg daily. An EGD was a consideration.  She tells me that things have not been great, in the setting of the recent loss of her aunt. She is tearful during her visit. She is not having any trouble with swallowing.  Heartburn is well controlled with pantoprazole 40 mg daily. She is not requiring any medication for breakthrough symptoms. No nausea or vomiting. No weight loss. She remains on mobic daily.  No melena or blood per rectum. Her bowels are moving daily. 12-Jan-2020 16:10

## 2025-01-22 ENCOUNTER — RX RENEWAL (OUTPATIENT)
Age: 75
End: 2025-01-22

## 2025-01-22 RX ORDER — ASPIRIN 81 MG/1
81 TABLET, COATED ORAL
Qty: 90 | Refills: 1 | Status: ACTIVE | COMMUNITY
Start: 2025-01-22 | End: 1900-01-01

## 2025-01-30 ENCOUNTER — APPOINTMENT (OUTPATIENT)
Dept: CARDIOLOGY | Facility: HOSPITAL | Age: 75
End: 2025-01-30

## 2025-01-30 ENCOUNTER — OUTPATIENT (OUTPATIENT)
Dept: OUTPATIENT SERVICES | Facility: HOSPITAL | Age: 75
LOS: 1 days | End: 2025-01-30
Payer: COMMERCIAL

## 2025-01-30 ENCOUNTER — NON-APPOINTMENT (OUTPATIENT)
Age: 75
End: 2025-01-30

## 2025-01-30 VITALS
DIASTOLIC BLOOD PRESSURE: 87 MMHG | SYSTOLIC BLOOD PRESSURE: 143 MMHG | HEART RATE: 57 BPM | BODY MASS INDEX: 21.68 KG/M2 | WEIGHT: 111 LBS | OXYGEN SATURATION: 99 %

## 2025-01-30 DIAGNOSIS — I10 ESSENTIAL (PRIMARY) HYPERTENSION: ICD-10-CM

## 2025-01-30 DIAGNOSIS — Z98.890 OTHER SPECIFIED POSTPROCEDURAL STATES: Chronic | ICD-10-CM

## 2025-01-30 DIAGNOSIS — Z95.2 PRESENCE OF PROSTHETIC HEART VALVE: ICD-10-CM

## 2025-01-30 DIAGNOSIS — I25.10 ATHEROSCLEROTIC HEART DISEASE OF NATIVE CORONARY ARTERY WITHOUT ANGINA PECTORIS: ICD-10-CM

## 2025-01-30 PROCEDURE — G0463: CPT

## 2025-01-30 PROCEDURE — 93005 ELECTROCARDIOGRAM TRACING: CPT

## 2025-01-30 NOTE — HISTORY OF PRESENT ILLNESS
[FreeTextEntry1] : Language: Mongolian   74F PMH mod-severe AR s/p bioprosthetic AVR w/ 23 Bovine Inspiris Resilia pericardial valve and aortic root endarterectomy to remove calcium, SVT, Nonobstructive CAD, HTN, HLD, SCC of Head/Neck s/p lymph node excision, Endometrial Ca s/p hysterectomy here for follow-up Feeling well today, reports resolution of her dizziness   PMH: AR, SVT, nonobstructive CAD, HTN, HLD, SCC of Head/Neck s/p lymph node excision, Endometrial Ca s/p hysterectomy  PSH: hysterectomy, lymph node excision, CCY, bioprosthetic AVR+aortic roon endarterectomy  FH: No cardiac hx  SH: Denies tobacco, EtOH, illicit drug use  Meds:   iA? Aspirin 81 81 MG Oral Tablet Delayed Release;  -AM iA? iA? Atorvastatin Calcium 80 MG Oral Tablet; Take one tablet daily - PM  Ezetimibe - 10mg PM iA? iA? Cephalexin 500 MG Oral Tablet; Take 4 tablets an hour before dental procedure to  prevent prosthetic valve infection  iA? iA? Lisinopril 10 MG Oral Tablet; Take one tablet daily -AM iA? iA? Metoprolol Succinate ER 50 MG Oral Tablet QHS - AM+PM All: PCN (itching in throat and tongue)   Stress TTE (5/2/24): Abnormal exercise stress echocardiogram. Achieved 4.6 METs: poor exercise capacity.Moderate to severe aortic regurgitation. At stress, valve assessment revealed severe aortic regurgitation.  [facility] 6/2024: Diffuse nonobstructive CAD  Lipid Panel 4/2024:   HDL 65 TGs 226 Chol 205  Lipid Panel 10/2024: LDL 81 HDL 57 TGs 174 Chol 168  HbA1c 4/2024: 5.9%

## 2025-01-30 NOTE — ASSESSMENT
[FreeTextEntry1] : #Mod-Severe AR  #S/P AVR  #S/P Aortic Root Endarterectomy  - continue aspirin 81mg  - prophylactic cephalexin prior to dental procedures: 2 g 30 to 60 minutes before procedure; if inadvertently not given prior to the procedure, may be administered up to 2 hours after the procedure  - TTE 1yr post-op 6/2025   #Nonobstructive CAD  - c/w atorvastatin 80mg QHS  - started ezetimibe 10mg PO QDay at last visit  - repeat lipid panel at next visit   #Hx SVT  #Palpitations  Noted during hospitalization for [facility] in 2009 to have SVT episode with HR 150s. Patient continues to report daily palpitations. 14d Zio patch showed 219 episodes of SVT, longest 28s at HR 110s, fastest 5 beats at 179. Patient-triggered events not associated with arrhythmic episodes. Reporting some daytime fatigue and resolution of palpitations, will decrease metoprolol dose and follow symptoms  - cont metoprolol XL 50mg BID  #HTN  Still mildly hypertensive, however pt reporting orthostatic symptoms on this dose. We discussed moving antiHTN doses to the evening and keeping at current dose despite some persistent HTN to avoid orthostatic syncope.  - increase lisinopril to 20mg QDay  -BMP at next visit  #Pre-DM2  -HbA1c at next visit

## 2025-01-30 NOTE — HISTORY OF PRESENT ILLNESS
[FreeTextEntry1] : Language: Lithuanian   74F PMH mod-severe AR s/p bioprosthetic AVR w/ 23 Bovine Inspiris Resilia pericardial valve and aortic root endarterectomy to remove calcium, SVT, Nonobstructive CAD, HTN, HLD, SCC of Head/Neck s/p lymph node excision, Endometrial Ca s/p hysterectomy here for follow-up Feeling well today, reports resolution of her dizziness   PMH: AR, SVT, nonobstructive CAD, HTN, HLD, SCC of Head/Neck s/p lymph node excision, Endometrial Ca s/p hysterectomy  PSH: hysterectomy, lymph node excision, CCY, bioprosthetic AVR+aortic roon endarterectomy  FH: No cardiac hx  SH: Denies tobacco, EtOH, illicit drug use  Meds:   iA? Aspirin 81 81 MG Oral Tablet Delayed Release;  -AM iA? iA? Atorvastatin Calcium 80 MG Oral Tablet; Take one tablet daily - PM  Ezetimibe - 10mg PM iA? iA? Cephalexin 500 MG Oral Tablet; Take 4 tablets an hour before dental procedure to  prevent prosthetic valve infection  iA? iA? Lisinopril 10 MG Oral Tablet; Take one tablet daily -AM iA? iA? Metoprolol Succinate ER 50 MG Oral Tablet QHS - AM+PM All: PCN (itching in throat and tongue)   Stress TTE (5/2/24): Abnormal exercise stress echocardiogram. Achieved 4.6 METs: poor exercise capacity.Moderate to severe aortic regurgitation. At stress, valve assessment revealed severe aortic regurgitation.  [facility] 6/2024: Diffuse nonobstructive CAD  Lipid Panel 4/2024:   HDL 65 TGs 226 Chol 205  Lipid Panel 10/2024: LDL 81 HDL 57 TGs 174 Chol 168  HbA1c 4/2024: 5.9%

## 2025-01-30 NOTE — ASSESSMENT
[FreeTextEntry1] : #Mod-Severe AR  #S/P AVR  #S/P Aortic Root Endarterectomy  - continue aspirin 81mg  - prophylactic cephalexin prior to dental procedures: 2 g 30 to 60 minutes before procedure; if inadvertently not given prior to the procedure, may be administered up to 2 hours after the procedure  - TTE 1yr post-op 6/2025   #Nonobstructive CAD  - c/w atorvastatin 80mg QHS  - started ezetimibe 10mg PO QDay at last visit  - repeat lipid panel at next visit   #Hx SVT  #Palpitations  Noted during hospitalization for [facility] in 2009 to have SVT episode with HR 150s. Patient continues to report daily palpitations. 14d Zio patch showed 219 episodes of SVT, longest 28s at HR 110s, fastest 5 beats at 179. Patient-triggered events not associated with arrhythmic episodes. Reporting some daytime fatigue and resolution of palpitations, will decrease metoprolol dose and follow symptoms  - cont metoprolol XL 50mg BID  #HTN  Still mildly hypertensive, however pt reporting orthostatic symptoms on this dose. We discussed moving antiHTN doses to the evening and keeping at current dose despite some persistent HTN to avoid orthostatic syncope.  - increase lisinopril to 20mg QDay  -BMP at next visit  #Pre-DM2  -HbA1c at next visit

## 2025-01-30 NOTE — ASSESSMENT
[FreeTextEntry1] : #Mod-Severe AR  #S/P AVR  #S/P Aortic Root Endarterectomy  - continue aspirin 81mg  - prophylactic cephalexin prior to dental procedures: 2 g 30 to 60 minutes before procedure; if inadvertently not given prior to the procedure, may be administered up to 2 hours after the procedure  - TTE 1yr post-op 6/2025   #Nonobstructive CAD  - c/w atorvastatin 80mg QHS  - started ezetimibe 10mg PO QDay at last visit  - repeat lipid panel at next visit   #Hx SVT  #Palpitations  Noted during hospitalization for [facility] in 2009 to have SVT episode with HR 150s. Patient continues to report daily palpitations. 14d Zio patch showed 219 episodes of SVT, longest 28s at HR 110s, fastest 5 beats at 179. Patient-triggered events not associated with arrhythmic episodes. Reporting some daytime fatigue and resolution of palpitations, will decrease metoprolol dose and follow symptoms  - cont metoprolol XL 50mg BID  #HTN  Still mildly hypertensive, however pt reporting orthostatic symptoms on this dose. We discussed moving antiHTN doses to the evening and keeping at current dose despite some persistent HTN to avoid orthostatic syncope.  - increase lisinopril to 20mg QDay  -BMP at next visit  #Pre-DM2  -HbA1c at next visit

## 2025-01-30 NOTE — HISTORY OF PRESENT ILLNESS
[FreeTextEntry1] : Language: Polish   74F PMH mod-severe AR s/p bioprosthetic AVR w/ 23 Bovine Inspiris Resilia pericardial valve and aortic root endarterectomy to remove calcium, SVT, Nonobstructive CAD, HTN, HLD, SCC of Head/Neck s/p lymph node excision, Endometrial Ca s/p hysterectomy here for follow-up Feeling well today, reports resolution of her dizziness   PMH: AR, SVT, nonobstructive CAD, HTN, HLD, SCC of Head/Neck s/p lymph node excision, Endometrial Ca s/p hysterectomy  PSH: hysterectomy, lymph node excision, CCY, bioprosthetic AVR+aortic roon endarterectomy  FH: No cardiac hx  SH: Denies tobacco, EtOH, illicit drug use  Meds:   iA? Aspirin 81 81 MG Oral Tablet Delayed Release;  -AM iA? iA? Atorvastatin Calcium 80 MG Oral Tablet; Take one tablet daily - PM  Ezetimibe - 10mg PM iA? iA? Cephalexin 500 MG Oral Tablet; Take 4 tablets an hour before dental procedure to  prevent prosthetic valve infection  iA? iA? Lisinopril 10 MG Oral Tablet; Take one tablet daily -AM iA? iA? Metoprolol Succinate ER 50 MG Oral Tablet QHS - AM+PM All: PCN (itching in throat and tongue)   Stress TTE (5/2/24): Abnormal exercise stress echocardiogram. Achieved 4.6 METs: poor exercise capacity.Moderate to severe aortic regurgitation. At stress, valve assessment revealed severe aortic regurgitation.  LHC 6/2024: Diffuse nonobstructive CAD  Lipid Panel 4/2024:   HDL 65 TGs 226 Chol 205  Lipid Panel 10/2024: LDL 81 HDL 57 TGs 174 Chol 168  HbA1c 4/2024: 5.9%

## 2025-01-31 DIAGNOSIS — Z95.2 PRESENCE OF PROSTHETIC HEART VALVE: ICD-10-CM

## 2025-01-31 DIAGNOSIS — I10 ESSENTIAL (PRIMARY) HYPERTENSION: ICD-10-CM

## 2025-02-20 ENCOUNTER — OUTPATIENT (OUTPATIENT)
Dept: OUTPATIENT SERVICES | Facility: HOSPITAL | Age: 75
LOS: 1 days | End: 2025-02-20
Payer: COMMERCIAL

## 2025-02-20 ENCOUNTER — NON-APPOINTMENT (OUTPATIENT)
Age: 75
End: 2025-02-20

## 2025-02-20 ENCOUNTER — RX RENEWAL (OUTPATIENT)
Age: 75
End: 2025-02-20

## 2025-02-20 ENCOUNTER — APPOINTMENT (OUTPATIENT)
Dept: CARDIOLOGY | Facility: HOSPITAL | Age: 75
End: 2025-02-20

## 2025-02-20 VITALS
HEIGHT: 60 IN | SYSTOLIC BLOOD PRESSURE: 133 MMHG | OXYGEN SATURATION: 99 % | BODY MASS INDEX: 21.79 KG/M2 | HEART RATE: 64 BPM | WEIGHT: 111 LBS | DIASTOLIC BLOOD PRESSURE: 75 MMHG

## 2025-02-20 DIAGNOSIS — I10 ESSENTIAL (PRIMARY) HYPERTENSION: ICD-10-CM

## 2025-02-20 DIAGNOSIS — I25.10 ATHEROSCLEROTIC HEART DISEASE OF NATIVE CORONARY ARTERY WITHOUT ANGINA PECTORIS: ICD-10-CM

## 2025-02-20 DIAGNOSIS — I25.10 ATHEROSCLEROTIC HEART DISEASE OF NATIVE CORONARY ARTERY W/OUT ANGINA PECTORIS: ICD-10-CM

## 2025-02-20 DIAGNOSIS — Z95.2 PRESENCE OF PROSTHETIC HEART VALVE: ICD-10-CM

## 2025-02-20 PROCEDURE — G0463: CPT

## 2025-02-20 PROCEDURE — 93005 ELECTROCARDIOGRAM TRACING: CPT

## 2025-02-21 DIAGNOSIS — I10 ESSENTIAL (PRIMARY) HYPERTENSION: ICD-10-CM

## 2025-02-25 LAB
ALBUMIN SERPL ELPH-MCNC: 4.3 G/DL
ALP BLD-CCNC: 107 U/L
ALT SERPL-CCNC: 11 U/L
ANION GAP SERPL CALC-SCNC: 9 MMOL/L
AST SERPL-CCNC: 20 U/L
BILIRUB SERPL-MCNC: 0.5 MG/DL
BUN SERPL-MCNC: 15 MG/DL
CALCIUM SERPL-MCNC: 9.5 MG/DL
CHLORIDE SERPL-SCNC: 99 MMOL/L
CHOLEST SERPL-MCNC: 139 MG/DL
CO2 SERPL-SCNC: 25 MMOL/L
CREAT SERPL-MCNC: 0.68 MG/DL
EGFR: 92 ML/MIN/1.73M2
ESTIMATED AVERAGE GLUCOSE: 126 MG/DL
GLUCOSE SERPL-MCNC: 102 MG/DL
HBA1C MFR BLD HPLC: 6 %
HDLC SERPL-MCNC: 59 MG/DL
LDLC SERPL CALC-MCNC: 61 MG/DL
NONHDLC SERPL-MCNC: 80 MG/DL
POTASSIUM SERPL-SCNC: 5.1 MMOL/L
PROT SERPL-MCNC: 6.7 G/DL
SODIUM SERPL-SCNC: 133 MMOL/L
TRIGL SERPL-MCNC: 109 MG/DL

## 2025-02-27 NOTE — HISTORY OF PRESENT ILLNESS
[FreeTextEntry1] : Language: Cymro   74F PMH mod-severe AR s/p bioprosthetic AVR w/ 23 Bovine Inspiris Resilia pericardial valve and aortic root endarterectomy to remove calcium, SVT, Nonobstructive CAD, HTN, HLD, SCC of Head/Neck s/p lymph node excision, Endometrial Ca s/p hysterectomy here for follow-up Feeling well today, reports she had one dizzy spell a few days after increasing her lisinopril but afterwards symptoms resolved.  Denies and shortness of breath, able to climb stairs very well at home. repeat BP 170s/80s in both arms manually, reports typically BPs are well-controlled when she takes them at home.   PMH: AR, SVT, nonobstructive CAD, HTN, HLD, SCC of Head/Neck s/p lymph node excision, Endometrial Ca s/p hysterectomy  PSH: hysterectomy, lymph node excision, CCY, bioprosthetic AVR+aortic root endarterectomy  FH: No cardiac hx  SH: Denies tobacco, EtOH, illicit drug use  Meds:  Aspirin 81 81 MG Oral Tablet Delayed Release; -AM  Atorvastatin Calcium 80 MG Oral Tablet; Take one tablet daily - PM  Ezetimibe - 10mg PM  Cephalexin 500 MG Oral Tablet; Take 4 tablets an hour before dental procedure to prevent prosthetic valve infection  Lisinopril 10 MG Oral Tablet; Take one tablet daily -AM  Metoprolol Succinate ER 50 MG Oral Tablet BID  All: PCN (itching in throat and tongue)   Stress TTE (5/2/24): Abnormal exercise stress echocardiogram. Achieved 4.6 METs: poor exercise capacity. Moderate to severe aortic regurgitation. At stress, valve assessment revealed severe aortic regurgitation.  LHC 6/2024: Diffuse nonobstructive CAD  Lipid Panel 4/2024:  HDL 65 TGs 226 Chol 205  Lipid Panel 10/2024: LDL 81 HDL 57 TGs 174 Chol 168  HbA1c 4/2024: 5.9%

## 2025-02-27 NOTE — END OF VISIT
[] : Fellow [FreeTextEntry3] : Clinically doing well. Monitor BP at home. Continue current medications. Will schedule for a follow up TTE

## 2025-02-27 NOTE — ASSESSMENT
[FreeTextEntry1] : #S/P AVR  #S/P Aortic Root Endarterectomy  - continue aspirin 81mg  - prophylactic cephalexin prior to dental procedures: 2 g 30 to 60 minutes before procedure; if inadvertently not given prior to the procedure, may be administered up to 2 hours after the procedure  - TTE 1yr post-op 6/2025, will schedule today  #Nonobstructive CAD  - c/w atorvastatin 80mg QHS  - c/w ezetimibe 10mg PO QDay at last visit  - repeat fasting lipid panel    #Hx SVT  #Palpitations  Noted during hospitalization for [facility] in 2009 to have SVT episode with HR 150s. Patient continues to report daily palpitations. 14d Zio patch showed 219 episodes of SVT, longest 28s at HR 110s, fastest 5 beats at 179. Patient-triggered events not associated with arrhythmic episodes. Reporting some daytime fatigue and resolution of palpitations, will decrease metoprolol dose and follow symptoms  - cont metoprolol XL 50mg BID   #HTN  Orthostatic symptoms resolved. BPs uncharacteristically elevated today with 170s/80s in both arms. Possibly white-coat vs medication nonadherence. Pt will measure BPs at home and bring results in next week.  - c/w lisinopril 20mg QDay  - CMP  -f/u home BPs   #Pre-DM2  -HbA1c today

## 2025-02-27 NOTE — HISTORY OF PRESENT ILLNESS
[FreeTextEntry1] : Language: Bhutanese   74F PMH mod-severe AR s/p bioprosthetic AVR w/ 23 Bovine Inspiris Resilia pericardial valve and aortic root endarterectomy to remove calcium, SVT, Nonobstructive CAD, HTN, HLD, SCC of Head/Neck s/p lymph node excision, Endometrial Ca s/p hysterectomy here for follow-up Feeling well today, reports she had one dizzy spell a few days after increasing her lisinopril but afterwards symptoms resolved.  Denies and shortness of breath, able to climb stairs very well at home. repeat BP 170s/80s in both arms manually, reports typically BPs are well-controlled when she takes them at home.   PMH: AR, SVT, nonobstructive CAD, HTN, HLD, SCC of Head/Neck s/p lymph node excision, Endometrial Ca s/p hysterectomy  PSH: hysterectomy, lymph node excision, CCY, bioprosthetic AVR+aortic root endarterectomy  FH: No cardiac hx  SH: Denies tobacco, EtOH, illicit drug use  Meds:  Aspirin 81 81 MG Oral Tablet Delayed Release; -AM  Atorvastatin Calcium 80 MG Oral Tablet; Take one tablet daily - PM  Ezetimibe - 10mg PM  Cephalexin 500 MG Oral Tablet; Take 4 tablets an hour before dental procedure to prevent prosthetic valve infection  Lisinopril 10 MG Oral Tablet; Take one tablet daily -AM  Metoprolol Succinate ER 50 MG Oral Tablet BID  All: PCN (itching in throat and tongue)   Stress TTE (5/2/24): Abnormal exercise stress echocardiogram. Achieved 4.6 METs: poor exercise capacity. Moderate to severe aortic regurgitation. At stress, valve assessment revealed severe aortic regurgitation.  LHC 6/2024: Diffuse nonobstructive CAD  Lipid Panel 4/2024:  HDL 65 TGs 226 Chol 205  Lipid Panel 10/2024: LDL 81 HDL 57 TGs 174 Chol 168  HbA1c 4/2024: 5.9%

## 2025-02-27 NOTE — HISTORY OF PRESENT ILLNESS
[FreeTextEntry1] : Language: Slovenian   74F PMH mod-severe AR s/p bioprosthetic AVR w/ 23 Bovine Inspiris Resilia pericardial valve and aortic root endarterectomy to remove calcium, SVT, Nonobstructive CAD, HTN, HLD, SCC of Head/Neck s/p lymph node excision, Endometrial Ca s/p hysterectomy here for follow-up Feeling well today, reports she had one dizzy spell a few days after increasing her lisinopril but afterwards symptoms resolved.  Denies and shortness of breath, able to climb stairs very well at home. repeat BP 170s/80s in both arms manually, reports typically BPs are well-controlled when she takes them at home.   PMH: AR, SVT, nonobstructive CAD, HTN, HLD, SCC of Head/Neck s/p lymph node excision, Endometrial Ca s/p hysterectomy  PSH: hysterectomy, lymph node excision, CCY, bioprosthetic AVR+aortic root endarterectomy  FH: No cardiac hx  SH: Denies tobacco, EtOH, illicit drug use  Meds:  Aspirin 81 81 MG Oral Tablet Delayed Release; -AM  Atorvastatin Calcium 80 MG Oral Tablet; Take one tablet daily - PM  Ezetimibe - 10mg PM  Cephalexin 500 MG Oral Tablet; Take 4 tablets an hour before dental procedure to prevent prosthetic valve infection  Lisinopril 10 MG Oral Tablet; Take one tablet daily -AM  Metoprolol Succinate ER 50 MG Oral Tablet BID  All: PCN (itching in throat and tongue)   Stress TTE (5/2/24): Abnormal exercise stress echocardiogram. Achieved 4.6 METs: poor exercise capacity. Moderate to severe aortic regurgitation. At stress, valve assessment revealed severe aortic regurgitation.  LHC 6/2024: Diffuse nonobstructive CAD  Lipid Panel 4/2024:  HDL 65 TGs 226 Chol 205  Lipid Panel 10/2024: LDL 81 HDL 57 TGs 174 Chol 168  HbA1c 4/2024: 5.9%

## 2025-02-27 NOTE — ASSESSMENT
[FreeTextEntry1] : #S/P AVR  #S/P Aortic Root Endarterectomy  - continue aspirin 81mg  - prophylactic cephalexin prior to dental procedures: 2 g 30 to 60 minutes before procedure; if inadvertently not given prior to the procedure, may be administered up to 2 hours after the procedure  - TTE 1yr post-op 6/2025, will schedule today  #Nonobstructive CAD  - c/w atorvastatin 80mg QHS  - c/w ezetimibe 10mg PO QDay at last visit  - repeat fasting lipid panel    #Hx SVT  #Palpitations  Noted during hospitalization for [facility] in 2009 to have SVT episode with HR 150s. Patient continues to report daily palpitations. 14d Zio patch showed 219 episodes of SVT, longest 28s at HR 110s, fastest 5 beats at 179. Patient-triggered events not associated with arrhythmic episodes. Reporting some daytime fatigue and resolution of palpitations, will decrease metoprolol dose and follow symptoms  - cont metoprolol XL 50mg BID   #HTN  Orthostatic symptoms resolved. BPs uncharacteristically elevated today with 170s/80s in both arms. Possibly white-coat vs medication nonadherence. Pt will measure BPs at home and bring results in next week.  - c/w lisinopril 20mg QDay  - CMP  -f/u home BPs   #Pre-DM2  -HbA1c today

## 2025-02-27 NOTE — ASSESSMENT
[FreeTextEntry1] : #S/P AVR  #S/P Aortic Root Endarterectomy  - continue aspirin 81mg  - prophylactic cephalexin prior to dental procedures: 2 g 30 to 60 minutes before procedure; if inadvertently not given prior to the procedure, may be administered up to 2 hours after the procedure  - TTE 1yr post-op 6/2025, will schedule today  #Nonobstructive CAD  - c/w atorvastatin 80mg QHS  - c/w ezetimibe 10mg PO QDay at last visit  - repeat fasting lipid panel    #Hx SVT  #Palpitations  Noted during hospitalization for [facility] in 2009 to have SVT episode with HR 150s. Patient continues to report daily palpitations. 14d Zio patch showed 219 episodes of SVT, longest 28s at HR 110s, fastest 5 beats at 179. Patient-triggered events not associated with arrhythmic episodes. Reporting some daytime fatigue and resolution of palpitations, will decrease metoprolol dose and follow symptoms  - cont metoprolol XL 50mg BID   #HTN  Orthostatic symptoms resolved. BPs uncharacteristically elevated today with 170s/80s in both arms. Possibly white-coat vs medication nonadherence. Pt will measure BPs at home and bring results in next week.  - c/w lisinopril 20mg QDay  - CMP  -f/u home BPs   #Pre-DM2  -HbA1c today

## 2025-04-23 ENCOUNTER — RX RENEWAL (OUTPATIENT)
Age: 75
End: 2025-04-23

## 2025-04-23 RX ORDER — LISINOPRIL 10 MG/1
10 TABLET ORAL
Qty: 90 | Refills: 0 | Status: ACTIVE | COMMUNITY
Start: 2025-04-23 | End: 1900-01-01

## 2025-06-02 ENCOUNTER — APPOINTMENT (OUTPATIENT)
Dept: CV DIAGNOSITCS | Facility: HOSPITAL | Age: 75
End: 2025-06-02

## 2025-06-05 ENCOUNTER — APPOINTMENT (OUTPATIENT)
Dept: CARDIOLOGY | Facility: HOSPITAL | Age: 75
End: 2025-06-05

## 2025-07-16 ENCOUNTER — RX RENEWAL (OUTPATIENT)
Age: 75
End: 2025-07-16

## (undated) DEVICE — PACING CABLE A/V TEMP SCREW DOWN 6FT

## (undated) DEVICE — MULTIPLE PERFUSION SET FEMALE 1 INLET LEG W 4 LEGS 15" (BLUE/RED)

## (undated) DEVICE — GLV 6.5 PROTEXIS (WHITE)

## (undated) DEVICE — DRAIN RESERVOIR FOR JACKSON PRATT 100CC CARDINAL

## (undated) DEVICE — SUT QUILL MONODERM 3-0 30CM PS-2

## (undated) DEVICE — SYR LUER LOK 30CC

## (undated) DEVICE — NDL COUNTER FOAM AND MAGNET 40-70

## (undated) DEVICE — GLV 8 PROTEXIS ORTHO (CREAM)

## (undated) DEVICE — DRAPE MAYO STAND 30"

## (undated) DEVICE — GOWN TRIMAX LG

## (undated) DEVICE — DRAPE SLUSH / WARMER 44 X 66"

## (undated) DEVICE — SUCTION YANKAUER NO CONTROL VENT

## (undated) DEVICE — DRAIN CHANNEL 32FR ROUND HUBLESS FULL FLUTED

## (undated) DEVICE — SUT STAINLESS STEEL 5 18" SCC

## (undated) DEVICE — STOPCOCK 4-WAY W SWIVEL MALE LUER LOCK NON VENTED RED CAP

## (undated) DEVICE — DRAPE IOBAN 23" X 23"

## (undated) DEVICE — GOWN XXXL

## (undated) DEVICE — SYR LUER LOK 20CC

## (undated) DEVICE — DRAPE TOWEL BLUE 17" X 24"

## (undated) DEVICE — CONNECTOR "Y" 1/2 X 1/2 X 3/8"

## (undated) DEVICE — LAP PAD 18 X 18"

## (undated) DEVICE — SAW BLADE STRYKER STERNUM 31MM X 6.27 X .79

## (undated) DEVICE — SUT POLYSORB 0 36" GS-25 UNDYED

## (undated) DEVICE — TOURNIQUET SET 12FR (1 RED, 1 BLUE, 1 SNARE) 7"

## (undated) DEVICE — SUT PROLENE 3-0 36" SH

## (undated) DEVICE — BLADE SCALPEL SAFETYLOCK #10

## (undated) DEVICE — PACK CARDIAC YELLOW

## (undated) DEVICE — SUT PROLENE 4-0 36" SH

## (undated) DEVICE — SUT DOUBLE 6 WIRE STERNAL

## (undated) DEVICE — PACK UNIVERSAL CARDIAC SUPPLEMNTAL B

## (undated) DEVICE — SYR LUER LOK 1CC

## (undated) DEVICE — SYR LUER LOK 50CC

## (undated) DEVICE — SUT SOFSILK 0 30" V-20

## (undated) DEVICE — SUT VICRYL 2-0 27" CT-1 UNDYED

## (undated) DEVICE — SAW BLADE MICROAIRE STERNUM 1X34X9.4MM

## (undated) DEVICE — TUBING ATS SUCTION LINE

## (undated) DEVICE — SUT ETHIBOND 2-0 36" SH

## (undated) DEVICE — NDL HYPO SAFE 18G X 1.5" (PINK)

## (undated) DEVICE — ELCTR BOVIE TIP BLADE MEGADYNE E-Z CLEAN 2.75" (X-LONG)

## (undated) DEVICE — PREP CHLORAPREP HI-LITE ORANGE 26ML

## (undated) DEVICE — URETERAL CATH RED RUBBER 20FR (YELLOW)

## (undated) DEVICE — SUT ETHIBOND 2-0 4-30" RB-1 WHITE

## (undated) DEVICE — GLV 7 PROTEXIS (WHITE)

## (undated) DEVICE — CONNECTOR STRAIGHT 3/8 X 1/2"

## (undated) DEVICE — ELCTR BOVIE TIP BLADE VALLEYLAB 6.5"

## (undated) DEVICE — SUMP INTRACARDIAC 20FR 1/4" ADULT

## (undated) DEVICE — GLV 7.5 PROTEXIS (WHITE)

## (undated) DEVICE — GLV 8 PROTEXIS (WHITE)

## (undated) DEVICE — SOL IRR POUR NS 0.9% 500ML

## (undated) DEVICE — TUBING KIT FAST START ATF 40

## (undated) DEVICE — SOL IRR POUR H2O 250ML

## (undated) DEVICE — FILTER REINFUSION FOR SALVAGED BLOOD DISP

## (undated) DEVICE — SUT SOFSILK 2 60" TIES

## (undated) DEVICE — SUT ETHIBOND EXCEL 3-0 30" V-5 PLDGT 5 GREEN 5 WHITE

## (undated) DEVICE — SPONGE PEANUT AUTO COUNT

## (undated) DEVICE — FEEDING TUBE NG SUMP 16FR 48"

## (undated) DEVICE — ELCTR BOVIE PENCIL HANDPIECE ROCKER SWITCH 15FT

## (undated) DEVICE — DRAPE 1/2 SHEET 40X57"

## (undated) DEVICE — GLV 7.5 DERMAPRENE ULTRA

## (undated) DEVICE — RIGID ADULT SUCKER

## (undated) DEVICE — SUT PROLENE 4-0 36" BB

## (undated) DEVICE — URETERAL CATH RED RUBBER 8FR

## (undated) DEVICE — DRAIN CHANNEL 19FR ROUND FULL FLUTED

## (undated) DEVICE — SENSOR MYOCARDIAL TEMP 15MM

## (undated) DEVICE — SOL NORMOSOL-R PH7.4 1000ML

## (undated) DEVICE — VENODYNE/SCD SLEEVE CALF MEDIUM

## (undated) DEVICE — SOL IRR BAG NS 0.9% 3000ML

## (undated) DEVICE — Device

## (undated) DEVICE — VENTING ADAPTER "Y" (RED/BLUE) 7.5"

## (undated) DEVICE — SUT QUILL MONODERM 2-0 30CM 18MM

## (undated) DEVICE — BLADE SCALPEL SAFETYLOCK #15

## (undated) DEVICE — PAD MONITORING LOW LEVEL II

## (undated) DEVICE — PHRENIC NERVE PAD MEDIUM

## (undated) DEVICE — SPECIMEN CONTAINER 100ML

## (undated) DEVICE — DRSG DERMABOND PRINEO 60CM

## (undated) DEVICE — SUT POLYSORB 2-0 30" GS-21 UNDYED

## (undated) DEVICE — STAPLER SKIN VISI-STAT 35 WIDE

## (undated) DEVICE — TUBING IV SET MICROCLAVE ADAPTER

## (undated) DEVICE — SAW BLADE MICROAIRE STERNUM 1.1X50X42MM

## (undated) DEVICE — SUT TICRON 2-0 36" CV-316 DA

## (undated) DEVICE — VISITEC 4X4

## (undated) DEVICE — POSITIONER FOAM EGG CRATE ULNAR 2PCS (PINK)

## (undated) DEVICE — CATH IV SAFE BC 20G X 1.16" (PINK)

## (undated) DEVICE — SUT PLEDGET PRE PUNCH 4.8 X 9.5 X 1.5 MM

## (undated) DEVICE — PACK UNIVERSAL CARDIAC

## (undated) DEVICE — CONNECTOR "Y" 1/2 X 3/8 X 3/8"

## (undated) DEVICE — MARKING PEN W RULER

## (undated) DEVICE — BEAVER BLADE MINI SHARP ALL ROUND (BLUE)

## (undated) DEVICE — SUT PROLENE 5-0 30" RB-2

## (undated) DEVICE — TUBING TRUWAVE PRESSURE MALE/FEMALE 72"

## (undated) DEVICE — SUT POLYSORB 2 30" GS-26

## (undated) DEVICE — SUT TICRON 5 30" KV-40

## (undated) DEVICE — SYR ASEPTO

## (undated) DEVICE — WARMING BLANKET DUO-THERM HYPER/HYPOTHERM ADULT

## (undated) DEVICE — VESSEL LOOP EXTRA MAXI-BLUE 0.200" X 22"

## (undated) DEVICE — POSITIONER CARDIAC BUMP

## (undated) DEVICE — SUT PROLENE 4-0 36" RB-1

## (undated) DEVICE — PAD INSULATION ADULT CARDIAC

## (undated) DEVICE — SUT ETHIBOND 2-0 4-30" RB-1 GREEN

## (undated) DEVICE — SUMP PERICARDIAL 20FR 1/4" ADULT

## (undated) DEVICE — FOLEY TRAY 16FR 5CC LF LUBRISIL ADVANCE TEMP CLOSED

## (undated) DEVICE — GLV 8.5 PROTEXIS (WHITE)

## (undated) DEVICE — ELCTR REM POLYHESIVE ADULT PT RETURN 15FT

## (undated) DEVICE — STRYKER INTERPULSE HANDPIECE W IRR SUCTION TUBE

## (undated) DEVICE — SUT GORETEX CV-4 (3-0) 36" TH-18

## (undated) DEVICE — PACING CABLE (BROWN) A/V TEMP SCREW DOWN 12FT

## (undated) DEVICE — SUT SURGICAL STEEL 6 30" BP-1

## (undated) DEVICE — TUBING SUCTION NON CONDUCTIVE 316X18" STERILE

## (undated) DEVICE — CHEST DRAIN PLEUR-EVAC WET/WET ADULT-PEDS SINGLE (QUICK)

## (undated) DEVICE — ELCTR BOVIE TIP BLADE MEGADYNE E-Z CLEAN 6.5" (LONG)

## (undated) DEVICE — TUBING SUCTION 20FT

## (undated) DEVICE — BLADE SCALPEL SAFETYLOCK #11

## (undated) DEVICE — DRSG OPSITE 2.5 X 2"

## (undated) DEVICE — PACK W INSPIRE OXYGENATOR W HEMOCONCENTRATOR

## (undated) DEVICE — SUT BIOSYN 4-0 18" P-12

## (undated) DEVICE — DRSG TEGADERM 6"X8"

## (undated) DEVICE — DRSG OPSITE 13.75 X 4"